# Patient Record
Sex: FEMALE | Race: WHITE | Employment: OTHER | URBAN - METROPOLITAN AREA
[De-identification: names, ages, dates, MRNs, and addresses within clinical notes are randomized per-mention and may not be internally consistent; named-entity substitution may affect disease eponyms.]

---

## 2020-02-04 PROCEDURE — 96365 THER/PROPH/DIAG IV INF INIT: CPT

## 2020-02-04 PROCEDURE — 94762 N-INVAS EAR/PLS OXIMTRY CONT: CPT

## 2020-02-04 PROCEDURE — 96375 TX/PRO/DX INJ NEW DRUG ADDON: CPT

## 2020-02-04 PROCEDURE — 99285 EMERGENCY DEPT VISIT HI MDM: CPT

## 2020-02-05 ENCOUNTER — HOSPITAL ENCOUNTER (EMERGENCY)
Age: 75
Discharge: ANOTHER HOSPITAL | End: 2020-02-05
Attending: EMERGENCY MEDICINE
Payer: MEDICARE

## 2020-02-05 ENCOUNTER — ANESTHESIA EVENT (OUTPATIENT)
Dept: CARDIOTHORACIC SURGERY | Age: 75
DRG: 220 | End: 2020-02-05
Payer: MEDICARE

## 2020-02-05 ENCOUNTER — ANESTHESIA (OUTPATIENT)
Dept: CARDIOTHORACIC SURGERY | Age: 75
DRG: 220 | End: 2020-02-05
Payer: MEDICARE

## 2020-02-05 ENCOUNTER — HOSPITAL ENCOUNTER (OUTPATIENT)
Dept: NON INVASIVE DIAGNOSTICS | Age: 75
Discharge: HOME | End: 2020-02-05
Attending: THORACIC SURGERY (CARDIOTHORACIC VASCULAR SURGERY)

## 2020-02-05 ENCOUNTER — APPOINTMENT (OUTPATIENT)
Dept: CT IMAGING | Age: 75
End: 2020-02-05
Attending: EMERGENCY MEDICINE
Payer: MEDICARE

## 2020-02-05 ENCOUNTER — HOSPITAL ENCOUNTER (INPATIENT)
Age: 75
LOS: 7 days | Discharge: HOME HEALTH | DRG: 220 | End: 2020-02-12
Attending: THORACIC SURGERY (CARDIOTHORACIC VASCULAR SURGERY) | Admitting: THORACIC SURGERY (CARDIOTHORACIC VASCULAR SURGERY)
Payer: MEDICARE

## 2020-02-05 ENCOUNTER — APPOINTMENT (OUTPATIENT)
Dept: GENERAL RADIOLOGY | Age: 75
DRG: 220 | End: 2020-02-05
Attending: PHYSICIAN ASSISTANT
Payer: MEDICARE

## 2020-02-05 VITALS
HEIGHT: 64 IN | OXYGEN SATURATION: 99 % | BODY MASS INDEX: 25.61 KG/M2 | SYSTOLIC BLOOD PRESSURE: 114 MMHG | RESPIRATION RATE: 21 BRPM | WEIGHT: 150 LBS | TEMPERATURE: 98.4 F | HEART RATE: 99 BPM | DIASTOLIC BLOOD PRESSURE: 56 MMHG

## 2020-02-05 DIAGNOSIS — R07.9 CHEST PAIN, UNSPECIFIED TYPE: ICD-10-CM

## 2020-02-05 DIAGNOSIS — I71.019 DISSECTION OF THORACIC AORTA: Primary | ICD-10-CM

## 2020-02-05 DIAGNOSIS — Z98.890 S/P AORTIC DISSECTION REPAIR: Primary | ICD-10-CM

## 2020-02-05 PROBLEM — D62 POSTOPERATIVE ANEMIA DUE TO ACUTE BLOOD LOSS: Status: ACTIVE | Noted: 2020-02-05

## 2020-02-05 PROBLEM — I71.00 AORTIC DISSECTION (HCC): Status: ACTIVE | Noted: 2020-02-05

## 2020-02-05 LAB
ABO + RH BLD: NORMAL
ADMINISTERED INITIALS, ADMINIT: NORMAL
ALBUMIN SERPL-MCNC: 3.3 G/DL (ref 3.5–5)
ALBUMIN SERPL-MCNC: 3.6 G/DL (ref 3.5–5)
ALBUMIN SERPL-MCNC: 3.7 G/DL (ref 3.5–5)
ALBUMIN/GLOB SERPL: 1.2 {RATIO} (ref 1.1–2.2)
ALBUMIN/GLOB SERPL: 1.4 {RATIO} (ref 1.1–2.2)
ALBUMIN/GLOB SERPL: 1.9 {RATIO} (ref 1.1–2.2)
ALP SERPL-CCNC: 37 U/L (ref 45–117)
ALP SERPL-CCNC: 40 U/L (ref 45–117)
ALP SERPL-CCNC: 59 U/L (ref 45–117)
ALT SERPL-CCNC: 34 U/L (ref 12–78)
ALT SERPL-CCNC: 34 U/L (ref 12–78)
ALT SERPL-CCNC: 37 U/L (ref 12–78)
ANION GAP SERPL CALC-SCNC: 5 MMOL/L (ref 5–15)
ANION GAP SERPL CALC-SCNC: 6 MMOL/L (ref 5–15)
ANION GAP SERPL CALC-SCNC: 7 MMOL/L (ref 5–15)
APPEARANCE UR: ABNORMAL
APTT PPP: 26.6 SEC (ref 22.1–32)
APTT PPP: 27.6 SEC (ref 22.1–32)
ARTERIAL PATENCY WRIST A: ABNORMAL
ARTERIAL PATENCY WRIST A: ABNORMAL
AST SERPL-CCNC: 24 U/L (ref 15–37)
AST SERPL-CCNC: 44 U/L (ref 15–37)
AST SERPL-CCNC: 46 U/L (ref 15–37)
ATRIAL RATE: 64 BPM
BACTERIA URNS QL MICRO: NEGATIVE /HPF
BASE DEFICIT BLD-SCNC: 2 MMOL/L
BASE EXCESS BLD CALC-SCNC: 1 MMOL/L
BASOPHILS # BLD: 0 K/UL (ref 0–0.1)
BASOPHILS # BLD: 0 K/UL (ref 0–0.1)
BASOPHILS NFR BLD: 0 % (ref 0–1)
BASOPHILS NFR BLD: 0 % (ref 0–1)
BDY SITE: ABNORMAL
BDY SITE: ABNORMAL
BILIRUB SERPL-MCNC: 0.4 MG/DL (ref 0.2–1)
BILIRUB SERPL-MCNC: 0.6 MG/DL (ref 0.2–1)
BILIRUB SERPL-MCNC: 0.9 MG/DL (ref 0.2–1)
BILIRUB UR QL: NEGATIVE
BLOOD GROUP ANTIBODIES SERPL: NORMAL
BUN SERPL-MCNC: 13 MG/DL (ref 6–20)
BUN SERPL-MCNC: 14 MG/DL (ref 6–20)
BUN SERPL-MCNC: 15 MG/DL (ref 6–20)
BUN/CREAT SERPL: 12 (ref 12–20)
BUN/CREAT SERPL: 15 (ref 12–20)
BUN/CREAT SERPL: 17 (ref 12–20)
CA-I BLD-SCNC: 1.18 MMOL/L (ref 1.12–1.32)
CA-I BLD-SCNC: 1.22 MMOL/L (ref 1.12–1.32)
CALCIUM SERPL-MCNC: 8.1 MG/DL (ref 8.5–10.1)
CALCIUM SERPL-MCNC: 8.5 MG/DL (ref 8.5–10.1)
CALCIUM SERPL-MCNC: 8.6 MG/DL (ref 8.5–10.1)
CALCULATED P AXIS, ECG09: 36 DEGREES
CALCULATED R AXIS, ECG10: 54 DEGREES
CALCULATED T AXIS, ECG11: 62 DEGREES
CHLORIDE SERPL-SCNC: 108 MMOL/L (ref 97–108)
CHLORIDE SERPL-SCNC: 113 MMOL/L (ref 97–108)
CHLORIDE SERPL-SCNC: 115 MMOL/L (ref 97–108)
CO2 SERPL-SCNC: 25 MMOL/L (ref 21–32)
CO2 SERPL-SCNC: 26 MMOL/L (ref 21–32)
CO2 SERPL-SCNC: 26 MMOL/L (ref 21–32)
COLOR UR: ABNORMAL
COMMENT, HOLDF: NORMAL
CREAT SERPL-MCNC: 0.88 MG/DL (ref 0.55–1.02)
CREAT SERPL-MCNC: 0.92 MG/DL (ref 0.55–1.02)
CREAT SERPL-MCNC: 1.08 MG/DL (ref 0.55–1.02)
D50 ADMINISTERED, D50ADM: 0 ML
D50 ADMINISTERED, D50ADM: NORMAL ML
D50 ORDER, D50ORD: 0 ML
D50 ORDER, D50ORD: NORMAL ML
DIAGNOSIS, 93000: NORMAL
DIFFERENTIAL METHOD BLD: ABNORMAL
DIFFERENTIAL METHOD BLD: NORMAL
EOSINOPHIL # BLD: 0 K/UL (ref 0–0.4)
EOSINOPHIL # BLD: 0.1 K/UL (ref 0–0.4)
EOSINOPHIL NFR BLD: 0 % (ref 0–7)
EOSINOPHIL NFR BLD: 1 % (ref 0–7)
EPITH CASTS URNS QL MICRO: ABNORMAL /LPF
ERYTHROCYTE [DISTWIDTH] IN BLOOD BY AUTOMATED COUNT: 13 % (ref 11.5–14.5)
ERYTHROCYTE [DISTWIDTH] IN BLOOD BY AUTOMATED COUNT: 13 % (ref 11.5–14.5)
GAS FLOW.O2 O2 DELIVERY SYS: ABNORMAL L/MIN
GAS FLOW.O2 O2 DELIVERY SYS: ABNORMAL L/MIN
GAS FLOW.O2 SETTING OXYMISER: 12 BPM
GLOBULIN SER CALC-MCNC: 1.9 G/DL (ref 2–4)
GLOBULIN SER CALC-MCNC: 2.3 G/DL (ref 2–4)
GLOBULIN SER CALC-MCNC: 3 G/DL (ref 2–4)
GLSCOM COMMENTS: NORMAL
GLUCOSE BLD STRIP.AUTO-MCNC: 102 MG/DL (ref 65–100)
GLUCOSE BLD STRIP.AUTO-MCNC: 106 MG/DL (ref 65–100)
GLUCOSE BLD STRIP.AUTO-MCNC: 107 MG/DL (ref 65–100)
GLUCOSE BLD STRIP.AUTO-MCNC: 108 MG/DL (ref 65–100)
GLUCOSE BLD STRIP.AUTO-MCNC: 109 MG/DL (ref 65–100)
GLUCOSE BLD STRIP.AUTO-MCNC: 112 MG/DL (ref 65–100)
GLUCOSE BLD STRIP.AUTO-MCNC: 116 MG/DL (ref 65–100)
GLUCOSE BLD STRIP.AUTO-MCNC: 138 MG/DL (ref 65–100)
GLUCOSE BLD STRIP.AUTO-MCNC: 84 MG/DL (ref 65–100)
GLUCOSE BLD STRIP.AUTO-MCNC: 85 MG/DL (ref 65–100)
GLUCOSE BLD STRIP.AUTO-MCNC: 95 MG/DL (ref 65–100)
GLUCOSE BLD STRIP.AUTO-MCNC: 96 MG/DL (ref 65–100)
GLUCOSE SERPL-MCNC: 116 MG/DL (ref 65–100)
GLUCOSE SERPL-MCNC: 138 MG/DL (ref 65–100)
GLUCOSE SERPL-MCNC: 98 MG/DL (ref 65–100)
GLUCOSE UR STRIP.AUTO-MCNC: NEGATIVE MG/DL
GLUCOSE, GLC: 102 MG/DL
GLUCOSE, GLC: 106 MG/DL
GLUCOSE, GLC: 107 MG/DL
GLUCOSE, GLC: 108 MG/DL
GLUCOSE, GLC: 109 MG/DL
GLUCOSE, GLC: 111 MG/DL
GLUCOSE, GLC: 112 MG/DL
GLUCOSE, GLC: 113 MG/DL
GLUCOSE, GLC: 116 MG/DL
GLUCOSE, GLC: 128 MG/DL
GLUCOSE, GLC: 138 MG/DL
GLUCOSE, GLC: 138 MG/DL
GLUCOSE, GLC: 143 MG/DL
GLUCOSE, GLC: 189 MG/DL
GLUCOSE, GLC: 206 MG/DL
GLUCOSE, GLC: 210 MG/DL
GLUCOSE, GLC: 84 MG/DL
GLUCOSE, GLC: 85 MG/DL
GLUCOSE, GLC: 95 MG/DL
GLUCOSE, GLC: 96 MG/DL
GLUCOSE, GLC: NORMAL MG/DL
HCO3 BLD-SCNC: 23.7 MMOL/L (ref 22–26)
HCO3 BLD-SCNC: 25.6 MMOL/L (ref 22–26)
HCT VFR BLD AUTO: 25.6 % (ref 35–47)
HCT VFR BLD AUTO: 29.3 % (ref 35–47)
HCT VFR BLD AUTO: 35.8 % (ref 35–47)
HGB BLD-MCNC: 11.8 G/DL (ref 11.5–16)
HGB BLD-MCNC: 8.2 G/DL (ref 11.5–16)
HGB BLD-MCNC: 9.6 G/DL (ref 11.5–16)
HGB UR QL STRIP: NEGATIVE
HIGH TARGET, HITG: 130 MG/DL
HIGH TARGET, HITG: 140 MG/DL
HIGH TARGET, HITG: NORMAL MG/DL
IMM GRANULOCYTES # BLD AUTO: 0 K/UL (ref 0–0.04)
IMM GRANULOCYTES # BLD AUTO: 0.1 K/UL (ref 0–0.04)
IMM GRANULOCYTES NFR BLD AUTO: 0 % (ref 0–0.5)
IMM GRANULOCYTES NFR BLD AUTO: 1 % (ref 0–0.5)
INR PPP: 1.1 (ref 0.9–1.1)
INR PPP: 1.2 (ref 0.9–1.1)
INSULIN ADMINSTERED, INSADM: 1.1 UNITS/HOUR
INSULIN ADMINSTERED, INSADM: 1.3 UNITS/HOUR
INSULIN ADMINSTERED, INSADM: 1.6 UNITS/HOUR
INSULIN ADMINSTERED, INSADM: 1.9 UNITS/HOUR
INSULIN ADMINSTERED, INSADM: 2 UNITS/HOUR
INSULIN ADMINSTERED, INSADM: 2 UNITS/HOUR
INSULIN ADMINSTERED, INSADM: 2.3 UNITS/HOUR
INSULIN ADMINSTERED, INSADM: 2.5 UNITS/HOUR
INSULIN ADMINSTERED, INSADM: 2.6 UNITS/HOUR
INSULIN ADMINSTERED, INSADM: 2.6 UNITS/HOUR
INSULIN ADMINSTERED, INSADM: 3.1 UNITS/HOUR
INSULIN ADMINSTERED, INSADM: 3.4 UNITS/HOUR
INSULIN ADMINSTERED, INSADM: 3.6 UNITS/HOUR
INSULIN ADMINSTERED, INSADM: 4.3 UNITS/HOUR
INSULIN ADMINSTERED, INSADM: 5.8 UNITS/HOUR
INSULIN ADMINSTERED, INSADM: 5.8 UNITS/HOUR
INSULIN ADMINSTERED, INSADM: 7.5 UNITS/HOUR
INSULIN ADMINSTERED, INSADM: 7.7 UNITS/HOUR
INSULIN ADMINSTERED, INSADM: NORMAL UNITS/HOUR
INSULIN ORDER, INSORD: 1.1 UNITS/HOUR
INSULIN ORDER, INSORD: 1.3 UNITS/HOUR
INSULIN ORDER, INSORD: 1.6 UNITS/HOUR
INSULIN ORDER, INSORD: 1.9 UNITS/HOUR
INSULIN ORDER, INSORD: 2 UNITS/HOUR
INSULIN ORDER, INSORD: 2 UNITS/HOUR
INSULIN ORDER, INSORD: 2.3 UNITS/HOUR
INSULIN ORDER, INSORD: 2.5 UNITS/HOUR
INSULIN ORDER, INSORD: 2.6 UNITS/HOUR
INSULIN ORDER, INSORD: 2.6 UNITS/HOUR
INSULIN ORDER, INSORD: 3.1 UNITS/HOUR
INSULIN ORDER, INSORD: 3.4 UNITS/HOUR
INSULIN ORDER, INSORD: 3.6 UNITS/HOUR
INSULIN ORDER, INSORD: 4.3 UNITS/HOUR
INSULIN ORDER, INSORD: 5.8 UNITS/HOUR
INSULIN ORDER, INSORD: 5.8 UNITS/HOUR
INSULIN ORDER, INSORD: 7.5 UNITS/HOUR
INSULIN ORDER, INSORD: 7.7 UNITS/HOUR
INSULIN ORDER, INSORD: NORMAL UNITS/HOUR
KETONES UR QL STRIP.AUTO: NEGATIVE MG/DL
LEUKOCYTE ESTERASE UR QL STRIP.AUTO: ABNORMAL
LIPASE SERPL-CCNC: 352 U/L (ref 73–393)
LOW TARGET, LOT: 100 MG/DL
LOW TARGET, LOT: 95 MG/DL
LOW TARGET, LOT: NORMAL MG/DL
LYMPHOCYTES # BLD: 0.8 K/UL (ref 0.8–3.5)
LYMPHOCYTES # BLD: 1.9 K/UL (ref 0.8–3.5)
LYMPHOCYTES NFR BLD: 22 % (ref 12–49)
LYMPHOCYTES NFR BLD: 9 % (ref 12–49)
MAGNESIUM SERPL-MCNC: 2.3 MG/DL (ref 1.6–2.4)
MAGNESIUM SERPL-MCNC: 2.6 MG/DL (ref 1.6–2.4)
MCH RBC QN AUTO: 30.6 PG (ref 26–34)
MCH RBC QN AUTO: 30.6 PG (ref 26–34)
MCHC RBC AUTO-ENTMCNC: 32.8 G/DL (ref 30–36.5)
MCHC RBC AUTO-ENTMCNC: 33 G/DL (ref 30–36.5)
MCV RBC AUTO: 93 FL (ref 80–99)
MCV RBC AUTO: 93.3 FL (ref 80–99)
MINUTES UNTIL NEXT BG, NBG: 120 MIN
MINUTES UNTIL NEXT BG, NBG: 120 MIN
MINUTES UNTIL NEXT BG, NBG: 60 MIN
MINUTES UNTIL NEXT BG, NBG: NORMAL MIN
MONOCYTES # BLD: 0.5 K/UL (ref 0–1)
MONOCYTES # BLD: 0.7 K/UL (ref 0–1)
MONOCYTES NFR BLD: 6 % (ref 5–13)
MONOCYTES NFR BLD: 8 % (ref 5–13)
MUCOUS THREADS URNS QL MICRO: ABNORMAL /LPF
MULTIPLIER, MUL: 0.03
MULTIPLIER, MUL: 0.04
MULTIPLIER, MUL: 0.05
MULTIPLIER, MUL: 0.06
MULTIPLIER, MUL: 0.07
MULTIPLIER, MUL: NORMAL
NEUTS SEG # BLD: 6 K/UL (ref 1.8–8)
NEUTS SEG # BLD: 7.2 K/UL (ref 1.8–8)
NEUTS SEG NFR BLD: 69 % (ref 32–75)
NEUTS SEG NFR BLD: 84 % (ref 32–75)
NITRITE UR QL STRIP.AUTO: NEGATIVE
NRBC # BLD: 0 K/UL (ref 0–0.01)
NRBC # BLD: 0 K/UL (ref 0–0.01)
NRBC BLD-RTO: 0 PER 100 WBC
NRBC BLD-RTO: 0 PER 100 WBC
O2/TOTAL GAS SETTING VFR VENT: 50 %
O2/TOTAL GAS SETTING VFR VENT: 80 %
ORDER INITIALS, ORDINIT: NORMAL
P-R INTERVAL, ECG05: 202 MS
PCO2 BLD: 41 MMHG (ref 35–45)
PCO2 BLD: 42.9 MMHG (ref 35–45)
PEEP RESPIRATORY: 5 CMH2O
PEEP RESPIRATORY: 5 CMH2O
PH BLD: 7.37 [PH] (ref 7.35–7.45)
PH BLD: 7.38 [PH] (ref 7.35–7.45)
PH UR STRIP: 6 [PH] (ref 5–8)
PLATELET # BLD AUTO: 100 K/UL (ref 150–400)
PLATELET # BLD AUTO: 184 K/UL (ref 150–400)
PMV BLD AUTO: 10.2 FL (ref 8.9–12.9)
PMV BLD AUTO: 9.9 FL (ref 8.9–12.9)
PO2 BLD: 100 MMHG (ref 80–100)
PO2 BLD: 162 MMHG (ref 80–100)
POTASSIUM SERPL-SCNC: 3.3 MMOL/L (ref 3.5–5.1)
POTASSIUM SERPL-SCNC: 3.4 MMOL/L (ref 3.5–5.1)
POTASSIUM SERPL-SCNC: 4 MMOL/L (ref 3.5–5.1)
PRESSURE SUPPORT SETTING VENT: 5 CMH2O
PROT SERPL-MCNC: 5.6 G/DL (ref 6.4–8.2)
PROT SERPL-MCNC: 5.6 G/DL (ref 6.4–8.2)
PROT SERPL-MCNC: 6.6 G/DL (ref 6.4–8.2)
PROT UR STRIP-MCNC: 30 MG/DL
PROTHROMBIN TIME: 11.5 SEC (ref 9–11.1)
PROTHROMBIN TIME: 11.9 SEC (ref 9–11.1)
Q-T INTERVAL, ECG07: 444 MS
QRS DURATION, ECG06: 94 MS
QTC CALCULATION (BEZET), ECG08: 458 MS
RBC # BLD AUTO: 3.14 M/UL (ref 3.8–5.2)
RBC # BLD AUTO: 3.85 M/UL (ref 3.8–5.2)
RBC #/AREA URNS HPF: ABNORMAL /HPF (ref 0–5)
SAMPLES BEING HELD,HOLD: NORMAL
SAO2 % BLD: 98 % (ref 92–97)
SAO2 % BLD: 99 % (ref 92–97)
SERVICE CMNT-IMP: ABNORMAL
SERVICE CMNT-IMP: NORMAL
SODIUM SERPL-SCNC: 141 MMOL/L (ref 136–145)
SODIUM SERPL-SCNC: 144 MMOL/L (ref 136–145)
SODIUM SERPL-SCNC: 146 MMOL/L (ref 136–145)
SP GR UR REFRACTOMETRY: 1.02 (ref 1–1.03)
SPECIMEN EXP DATE BLD: NORMAL
SPECIMEN TYPE: ABNORMAL
SPECIMEN TYPE: ABNORMAL
THERAPEUTIC RANGE,PTTT: NORMAL SECS (ref 58–77)
THERAPEUTIC RANGE,PTTT: NORMAL SECS (ref 58–77)
TOTAL RESP. RATE, ITRR: 12
TOTAL RESP. RATE, ITRR: 17
TROPONIN I SERPL-MCNC: <0.05 NG/ML
UR CULT HOLD, URHOLD: NORMAL
UROBILINOGEN UR QL STRIP.AUTO: 0.2 EU/DL (ref 0.2–1)
VENTILATION MODE VENT: ABNORMAL
VENTILATION MODE VENT: ABNORMAL
VENTRICULAR RATE, ECG03: 64 BPM
VOLUME CONTROL IVLC: YES
VT SETTING VENT: 380 ML
WBC # BLD AUTO: 8.6 K/UL (ref 3.6–11)
WBC # BLD AUTO: 8.7 K/UL (ref 3.6–11)
WBC URNS QL MICRO: >100 /HPF (ref 0–4)

## 2020-02-05 PROCEDURE — 77030002933 HC SUT MCRYL J&J -A: Performed by: THORACIC SURGERY (CARDIOTHORACIC VASCULAR SURGERY)

## 2020-02-05 PROCEDURE — 74011000250 HC RX REV CODE- 250: Performed by: THORACIC SURGERY (CARDIOTHORACIC VASCULAR SURGERY)

## 2020-02-05 PROCEDURE — 74011636637 HC RX REV CODE- 636/637: Performed by: NURSE ANESTHETIST, CERTIFIED REGISTERED

## 2020-02-05 PROCEDURE — 85018 HEMOGLOBIN: CPT

## 2020-02-05 PROCEDURE — 85730 THROMBOPLASTIN TIME PARTIAL: CPT

## 2020-02-05 PROCEDURE — P9059 PLASMA, FRZ BETWEEN 8-24HOUR: HCPCS

## 2020-02-05 PROCEDURE — P9035 PLATELET PHERES LEUKOREDUCED: HCPCS

## 2020-02-05 PROCEDURE — 77030018846 HC SOL IRR STRL H20 ICUM -A: Performed by: THORACIC SURGERY (CARDIOTHORACIC VASCULAR SURGERY)

## 2020-02-05 PROCEDURE — 74011250636 HC RX REV CODE- 250/636: Performed by: THORACIC SURGERY (CARDIOTHORACIC VASCULAR SURGERY)

## 2020-02-05 PROCEDURE — 77030041244 HC CBL PACE EXT TEMP REMG -B: Performed by: THORACIC SURGERY (CARDIOTHORACIC VASCULAR SURGERY)

## 2020-02-05 PROCEDURE — 87086 URINE CULTURE/COLONY COUNT: CPT

## 2020-02-05 PROCEDURE — 77030012390 HC DRN CHST BTL GTNG -B: Performed by: THORACIC SURGERY (CARDIOTHORACIC VASCULAR SURGERY)

## 2020-02-05 PROCEDURE — 84484 ASSAY OF TROPONIN QUANT: CPT

## 2020-02-05 PROCEDURE — 77030020089 HC KT PERC FEM ART MEDT -C: Performed by: THORACIC SURGERY (CARDIOTHORACIC VASCULAR SURGERY)

## 2020-02-05 PROCEDURE — 82962 GLUCOSE BLOOD TEST: CPT

## 2020-02-05 PROCEDURE — 77030018835 HC SOL IRR LR ICUM -A: Performed by: THORACIC SURGERY (CARDIOTHORACIC VASCULAR SURGERY)

## 2020-02-05 PROCEDURE — 76010000118 HC CV SURG 6 TO 6.5 HR: Performed by: THORACIC SURGERY (CARDIOTHORACIC VASCULAR SURGERY)

## 2020-02-05 PROCEDURE — 77030010507 HC ADH SKN DERMBND J&J -B: Performed by: THORACIC SURGERY (CARDIOTHORACIC VASCULAR SURGERY)

## 2020-02-05 PROCEDURE — 81001 URINALYSIS AUTO W/SCOPE: CPT

## 2020-02-05 PROCEDURE — 74011636320 HC RX REV CODE- 636/320: Performed by: RADIOLOGY

## 2020-02-05 PROCEDURE — 3E083GC INTRODUCTION OF OTHER THERAPEUTIC SUBSTANCE INTO HEART, PERCUTANEOUS APPROACH: ICD-10-PCS | Performed by: THORACIC SURGERY (CARDIOTHORACIC VASCULAR SURGERY)

## 2020-02-05 PROCEDURE — 74011000250 HC RX REV CODE- 250: Performed by: NURSE ANESTHETIST, CERTIFIED REGISTERED

## 2020-02-05 PROCEDURE — 86900 BLOOD TYPING SEROLOGIC ABO: CPT

## 2020-02-05 PROCEDURE — 85025 COMPLETE CBC W/AUTO DIFF WBC: CPT

## 2020-02-05 PROCEDURE — 74011250636 HC RX REV CODE- 250/636: Performed by: EMERGENCY MEDICINE

## 2020-02-05 PROCEDURE — 77030013798 HC KT TRNSDUC PRSSR EDWD -B: Performed by: THORACIC SURGERY (CARDIOTHORACIC VASCULAR SURGERY)

## 2020-02-05 PROCEDURE — 77030018836 HC SOL IRR NACL ICUM -A: Performed by: THORACIC SURGERY (CARDIOTHORACIC VASCULAR SURGERY)

## 2020-02-05 PROCEDURE — 74011000250 HC RX REV CODE- 250: Performed by: PHYSICIAN ASSISTANT

## 2020-02-05 PROCEDURE — 30283B1 TRANSFUSION OF NONAUTOLOGOUS 4-FACTOR PROTHROMBIN COMPLEX CONCENTRATE INTO VEIN, PERCUTANEOUS APPROACH: ICD-10-PCS | Performed by: THORACIC SURGERY (CARDIOTHORACIC VASCULAR SURGERY)

## 2020-02-05 PROCEDURE — 77030041076 HC DRSG AG OPTICELL MDII -A: Performed by: THORACIC SURGERY (CARDIOTHORACIC VASCULAR SURGERY)

## 2020-02-05 PROCEDURE — 71045 X-RAY EXAM CHEST 1 VIEW: CPT

## 2020-02-05 PROCEDURE — 74011250636 HC RX REV CODE- 250/636: Performed by: NURSE ANESTHETIST, CERTIFIED REGISTERED

## 2020-02-05 PROCEDURE — 77030019702 HC WRP THER MENM -C: Performed by: THORACIC SURGERY (CARDIOTHORACIC VASCULAR SURGERY)

## 2020-02-05 PROCEDURE — P9045 ALBUMIN (HUMAN), 5%, 250 ML: HCPCS | Performed by: PHYSICIAN ASSISTANT

## 2020-02-05 PROCEDURE — 77030040504 HC DRN WND MDII -B: Performed by: THORACIC SURGERY (CARDIOTHORACIC VASCULAR SURGERY)

## 2020-02-05 PROCEDURE — 76010000179 HC OR TIME 6 TO 6.5 HR INTENSV-TIER 1: Performed by: THORACIC SURGERY (CARDIOTHORACIC VASCULAR SURGERY)

## 2020-02-05 PROCEDURE — 86923 COMPATIBILITY TEST ELECTRIC: CPT

## 2020-02-05 PROCEDURE — 77030020263 HC SOL INJ SOD CL0.9% LFCR 1000ML: Performed by: THORACIC SURGERY (CARDIOTHORACIC VASCULAR SURGERY)

## 2020-02-05 PROCEDURE — 36415 COLL VENOUS BLD VENIPUNCTURE: CPT

## 2020-02-05 PROCEDURE — 76060000043 HC ANESTHESIA 6 TO 6.5 HR: Performed by: THORACIC SURGERY (CARDIOTHORACIC VASCULAR SURGERY)

## 2020-02-05 PROCEDURE — 77030020851 HC CAUT BTRY HI AARM -A: Performed by: THORACIC SURGERY (CARDIOTHORACIC VASCULAR SURGERY)

## 2020-02-05 PROCEDURE — C1768 GRAFT, VASCULAR: HCPCS | Performed by: THORACIC SURGERY (CARDIOTHORACIC VASCULAR SURGERY)

## 2020-02-05 PROCEDURE — 80053 COMPREHEN METABOLIC PANEL: CPT

## 2020-02-05 PROCEDURE — 94002 VENT MGMT INPAT INIT DAY: CPT

## 2020-02-05 PROCEDURE — 85610 PROTHROMBIN TIME: CPT

## 2020-02-05 PROCEDURE — 74011636637 HC RX REV CODE- 636/637: Performed by: PHYSICIAN ASSISTANT

## 2020-02-05 PROCEDURE — 4A133B3 MONITORING OF ARTERIAL PRESSURE, PULMONARY, PERCUTANEOUS APPROACH: ICD-10-PCS | Performed by: ANESTHESIOLOGY

## 2020-02-05 PROCEDURE — 82803 BLOOD GASES ANY COMBINATION: CPT

## 2020-02-05 PROCEDURE — 77030025869: Performed by: THORACIC SURGERY (CARDIOTHORACIC VASCULAR SURGERY)

## 2020-02-05 PROCEDURE — 77030006994: Performed by: THORACIC SURGERY (CARDIOTHORACIC VASCULAR SURGERY)

## 2020-02-05 PROCEDURE — 83690 ASSAY OF LIPASE: CPT

## 2020-02-05 PROCEDURE — 02HQ32Z INSERTION OF MONITORING DEVICE INTO RIGHT PULMONARY ARTERY, PERCUTANEOUS APPROACH: ICD-10-PCS | Performed by: ANESTHESIOLOGY

## 2020-02-05 PROCEDURE — P9016 RBC LEUKOCYTES REDUCED: HCPCS

## 2020-02-05 PROCEDURE — 77030005513 HC CATH URETH FOL11 MDII -B: Performed by: THORACIC SURGERY (CARDIOTHORACIC VASCULAR SURGERY)

## 2020-02-05 PROCEDURE — 77030002986 HC SUT PROL J&J -A: Performed by: THORACIC SURGERY (CARDIOTHORACIC VASCULAR SURGERY)

## 2020-02-05 PROCEDURE — 77030014491 HC PLEDG PTFE BARD -A: Performed by: THORACIC SURGERY (CARDIOTHORACIC VASCULAR SURGERY)

## 2020-02-05 PROCEDURE — 77030010506 HC ADH BIOGLU CRYO -G: Performed by: THORACIC SURGERY (CARDIOTHORACIC VASCULAR SURGERY)

## 2020-02-05 PROCEDURE — 77030019579 HC CBL PACE DISP REMG -B: Performed by: THORACIC SURGERY (CARDIOTHORACIC VASCULAR SURGERY)

## 2020-02-05 PROCEDURE — 71275 CT ANGIOGRAPHY CHEST: CPT

## 2020-02-05 PROCEDURE — C9132 KCENTRA, PER I.U.: HCPCS | Performed by: THORACIC SURGERY (CARDIOTHORACIC VASCULAR SURGERY)

## 2020-02-05 PROCEDURE — 02VX0DZ RESTRICTION OF THORACIC AORTA, ASCENDING/ARCH WITH INTRALUMINAL DEVICE, OPEN APPROACH: ICD-10-PCS | Performed by: THORACIC SURGERY (CARDIOTHORACIC VASCULAR SURGERY)

## 2020-02-05 PROCEDURE — 77030011235 HC DRN PERCRD SUMP MEDT -B: Performed by: THORACIC SURGERY (CARDIOTHORACIC VASCULAR SURGERY)

## 2020-02-05 PROCEDURE — C1751 CATH, INF, PER/CENT/MIDLINE: HCPCS | Performed by: ANESTHESIOLOGY

## 2020-02-05 PROCEDURE — 77030020256 HC SOL INJ NACL 0.9%  500ML: Performed by: THORACIC SURGERY (CARDIOTHORACIC VASCULAR SURGERY)

## 2020-02-05 PROCEDURE — 65610000003 HC RM ICU SURGICAL

## 2020-02-05 PROCEDURE — 74011250636 HC RX REV CODE- 250/636: Performed by: PHYSICIAN ASSISTANT

## 2020-02-05 PROCEDURE — C1729 CATH, DRAINAGE: HCPCS | Performed by: THORACIC SURGERY (CARDIOTHORACIC VASCULAR SURGERY)

## 2020-02-05 PROCEDURE — C9113 INJ PANTOPRAZOLE SODIUM, VIA: HCPCS | Performed by: PHYSICIAN ASSISTANT

## 2020-02-05 PROCEDURE — 77030010818: Performed by: THORACIC SURGERY (CARDIOTHORACIC VASCULAR SURGERY)

## 2020-02-05 PROCEDURE — 74011000258 HC RX REV CODE- 258: Performed by: PHYSICIAN ASSISTANT

## 2020-02-05 PROCEDURE — 77030014008 HC SPNG HEMSTAT J&J -C: Performed by: THORACIC SURGERY (CARDIOTHORACIC VASCULAR SURGERY)

## 2020-02-05 PROCEDURE — 77030026438 HC STYL ET INTUB CARD -A: Performed by: ANESTHESIOLOGY

## 2020-02-05 PROCEDURE — 02HV33Z INSERTION OF INFUSION DEVICE INTO SUPERIOR VENA CAVA, PERCUTANEOUS APPROACH: ICD-10-PCS | Performed by: ANESTHESIOLOGY

## 2020-02-05 PROCEDURE — 77030008684 HC TU ET CUF COVD -B: Performed by: ANESTHESIOLOGY

## 2020-02-05 PROCEDURE — B246ZZ4 ULTRASONOGRAPHY OF RIGHT AND LEFT HEART, TRANSESOPHAGEAL: ICD-10-PCS | Performed by: ANESTHESIOLOGY

## 2020-02-05 PROCEDURE — 77030005401 HC CATH RAD ARRO -A: Performed by: ANESTHESIOLOGY

## 2020-02-05 PROCEDURE — 88304 TISSUE EXAM BY PATHOLOGIST: CPT

## 2020-02-05 PROCEDURE — 74011000258 HC RX REV CODE- 258: Performed by: EMERGENCY MEDICINE

## 2020-02-05 PROCEDURE — 74011000258 HC RX REV CODE- 258: Performed by: NURSE ANESTHETIST, CERTIFIED REGISTERED

## 2020-02-05 PROCEDURE — 74011250637 HC RX REV CODE- 250/637: Performed by: PHYSICIAN ASSISTANT

## 2020-02-05 PROCEDURE — 77030020167 HC PERF SET MULT MEDT -B: Performed by: THORACIC SURGERY (CARDIOTHORACIC VASCULAR SURGERY)

## 2020-02-05 PROCEDURE — 74011000250 HC RX REV CODE- 250: Performed by: EMERGENCY MEDICINE

## 2020-02-05 PROCEDURE — 93005 ELECTROCARDIOGRAM TRACING: CPT

## 2020-02-05 PROCEDURE — 77030010797: Performed by: THORACIC SURGERY (CARDIOTHORACIC VASCULAR SURGERY)

## 2020-02-05 PROCEDURE — 83735 ASSAY OF MAGNESIUM: CPT

## 2020-02-05 PROCEDURE — 77030011640 HC PAD GRND REM COVD -A: Performed by: THORACIC SURGERY (CARDIOTHORACIC VASCULAR SURGERY)

## 2020-02-05 PROCEDURE — 5A1221Z PERFORMANCE OF CARDIAC OUTPUT, CONTINUOUS: ICD-10-PCS | Performed by: THORACIC SURGERY (CARDIOTHORACIC VASCULAR SURGERY)

## 2020-02-05 PROCEDURE — 77030002973 HC SUT PLEDG CV SFT OVL TELE -B: Performed by: THORACIC SURGERY (CARDIOTHORACIC VASCULAR SURGERY)

## 2020-02-05 PROCEDURE — 93355 ECHO TRANSESOPHAGEAL (TEE): CPT | Performed by: THORACIC SURGERY (CARDIOTHORACIC VASCULAR SURGERY)

## 2020-02-05 PROCEDURE — 74011000636 HC RX REV CODE- 636: Performed by: THORACIC SURGERY (CARDIOTHORACIC VASCULAR SURGERY)

## 2020-02-05 DEVICE — BARD® PTFE FELT, 15.2 CM X 15.2 CM
Type: IMPLANTABLE DEVICE | Site: AORTA | Status: FUNCTIONAL
Brand: BARD® PTFE FELT

## 2020-02-05 DEVICE — HEMASHIELD PLATINUM WOVEN STRAIGHT DOUBLE VELOUR VASCULAR GRAFT WITH GRAFT SIZER ACCESSORY
Type: IMPLANTABLE DEVICE | Site: AORTA | Status: FUNCTIONAL
Brand: HEMASHIELD

## 2020-02-05 RX ORDER — TRIAZOLAM 0.12 MG/1
0.12 TABLET ORAL DAILY
COMMUNITY
Start: 2020-01-02 | End: 2020-02-17

## 2020-02-05 RX ORDER — SODIUM CHLORIDE 9 MG/ML
INJECTION, SOLUTION INTRAVENOUS
Status: DISCONTINUED | OUTPATIENT
Start: 2020-02-05 | End: 2020-02-05 | Stop reason: HOSPADM

## 2020-02-05 RX ORDER — FENTANYL CITRATE 50 UG/ML
INJECTION, SOLUTION INTRAMUSCULAR; INTRAVENOUS
Status: DISCONTINUED
Start: 2020-02-05 | End: 2020-02-05 | Stop reason: HOSPADM

## 2020-02-05 RX ORDER — SODIUM CHLORIDE 9 MG/ML
9 INJECTION, SOLUTION INTRAVENOUS CONTINUOUS
Status: DISCONTINUED | OUTPATIENT
Start: 2020-02-05 | End: 2020-02-07

## 2020-02-05 RX ORDER — CEFAZOLIN SODIUM 1 G/3ML
INJECTION, POWDER, FOR SOLUTION INTRAMUSCULAR; INTRAVENOUS AS NEEDED
Status: DISCONTINUED | OUTPATIENT
Start: 2020-02-05 | End: 2020-02-05 | Stop reason: HOSPADM

## 2020-02-05 RX ORDER — DEXTROSE MONOHYDRATE 100 MG/ML
0-250 INJECTION, SOLUTION INTRAVENOUS AS NEEDED
Status: DISCONTINUED | OUTPATIENT
Start: 2020-02-05 | End: 2020-02-12 | Stop reason: HOSPADM

## 2020-02-05 RX ORDER — POLYETHYLENE GLYCOL 3350 17 G/17G
17 POWDER, FOR SOLUTION ORAL DAILY
Status: DISCONTINUED | OUTPATIENT
Start: 2020-02-06 | End: 2020-02-12 | Stop reason: HOSPADM

## 2020-02-05 RX ORDER — PHENYLEPHRINE 10 MG/250 ML(40 MCG/ML)IN 0.9 % SOD.CHLORIDE INTRAVENOUS
10-100
Status: DISCONTINUED | OUTPATIENT
Start: 2020-02-05 | End: 2020-02-05

## 2020-02-05 RX ORDER — SODIUM CHLORIDE 9 MG/ML
250 INJECTION, SOLUTION INTRAVENOUS AS NEEDED
Status: DISCONTINUED | OUTPATIENT
Start: 2020-02-05 | End: 2020-02-07

## 2020-02-05 RX ORDER — HEPARIN SOD,PORCINE/0.9 % NACL 30K/1000ML
50-1000 INTRAVENOUS SOLUTION INTRAVENOUS AS NEEDED
Status: DISCONTINUED | OUTPATIENT
Start: 2020-02-05 | End: 2020-02-05

## 2020-02-05 RX ORDER — ONDANSETRON 2 MG/ML
4 INJECTION INTRAMUSCULAR; INTRAVENOUS
Status: DISCONTINUED | OUTPATIENT
Start: 2020-02-05 | End: 2020-02-12 | Stop reason: HOSPADM

## 2020-02-05 RX ORDER — SODIUM CHLORIDE 0.9 % (FLUSH) 0.9 %
5-40 SYRINGE (ML) INJECTION AS NEEDED
Status: DISCONTINUED | OUTPATIENT
Start: 2020-02-05 | End: 2020-02-07

## 2020-02-05 RX ORDER — MUPIROCIN 20 MG/G
OINTMENT TOPICAL 2 TIMES DAILY
Status: COMPLETED | OUTPATIENT
Start: 2020-02-05 | End: 2020-02-09

## 2020-02-05 RX ORDER — MAGNESIUM SULFATE HEPTAHYDRATE 40 MG/ML
2 INJECTION, SOLUTION INTRAVENOUS ONCE
Status: DISCONTINUED | OUTPATIENT
Start: 2020-02-05 | End: 2020-02-05

## 2020-02-05 RX ORDER — HEPARIN SODIUM 1000 [USP'U]/ML
INJECTION, SOLUTION INTRAVENOUS; SUBCUTANEOUS AS NEEDED
Status: DISCONTINUED | OUTPATIENT
Start: 2020-02-05 | End: 2020-02-05 | Stop reason: HOSPADM

## 2020-02-05 RX ORDER — SODIUM CHLORIDE 450 MG/100ML
10 INJECTION, SOLUTION INTRAVENOUS CONTINUOUS
Status: DISCONTINUED | OUTPATIENT
Start: 2020-02-05 | End: 2020-02-07

## 2020-02-05 RX ORDER — BACITRACIN 500 UNIT/G
1 PACKET (EA) TOPICAL AS NEEDED
Status: DISCONTINUED | OUTPATIENT
Start: 2020-02-05 | End: 2020-02-07

## 2020-02-05 RX ORDER — SUFENTANIL CITRATE 50 UG/ML
INJECTION EPIDURAL; INTRAVENOUS AS NEEDED
Status: DISCONTINUED | OUTPATIENT
Start: 2020-02-05 | End: 2020-02-05 | Stop reason: HOSPADM

## 2020-02-05 RX ORDER — VECURONIUM BROMIDE FOR INJECTION 1 MG/ML
INJECTION, POWDER, LYOPHILIZED, FOR SOLUTION INTRAVENOUS AS NEEDED
Status: DISCONTINUED | OUTPATIENT
Start: 2020-02-05 | End: 2020-02-05 | Stop reason: HOSPADM

## 2020-02-05 RX ORDER — PANTOPRAZOLE SODIUM 40 MG/1
40 TABLET, DELAYED RELEASE ORAL
Status: DISCONTINUED | OUTPATIENT
Start: 2020-02-06 | End: 2020-02-12 | Stop reason: HOSPADM

## 2020-02-05 RX ORDER — OXYCODONE HYDROCHLORIDE 5 MG/1
5 TABLET ORAL
Status: DISCONTINUED | OUTPATIENT
Start: 2020-02-05 | End: 2020-02-12 | Stop reason: HOSPADM

## 2020-02-05 RX ORDER — LANOLIN ALCOHOL/MO/W.PET/CERES
3 CREAM (GRAM) TOPICAL
Status: DISCONTINUED | OUTPATIENT
Start: 2020-02-05 | End: 2020-02-12 | Stop reason: HOSPADM

## 2020-02-05 RX ORDER — INSULIN LISPRO 100 [IU]/ML
INJECTION, SOLUTION INTRAVENOUS; SUBCUTANEOUS
Status: DISCONTINUED | OUTPATIENT
Start: 2020-02-05 | End: 2020-02-07

## 2020-02-05 RX ORDER — DIPHENHYDRAMINE HCL 25 MG
25 CAPSULE ORAL
Status: DISCONTINUED | OUTPATIENT
Start: 2020-02-05 | End: 2020-02-12 | Stop reason: HOSPADM

## 2020-02-05 RX ORDER — AMIODARONE HYDROCHLORIDE 200 MG/1
400 TABLET ORAL EVERY 12 HOURS
Status: DISCONTINUED | OUTPATIENT
Start: 2020-02-06 | End: 2020-02-12 | Stop reason: HOSPADM

## 2020-02-05 RX ORDER — PRAVASTATIN SODIUM 20 MG/1
20 TABLET ORAL DAILY
COMMUNITY
Start: 2019-12-04

## 2020-02-05 RX ORDER — HYDROMORPHONE HYDROCHLORIDE 1 MG/ML
0.5 INJECTION, SOLUTION INTRAMUSCULAR; INTRAVENOUS; SUBCUTANEOUS
Status: DISCONTINUED | OUTPATIENT
Start: 2020-02-05 | End: 2020-02-07

## 2020-02-05 RX ORDER — MIDAZOLAM HYDROCHLORIDE 1 MG/ML
INJECTION, SOLUTION INTRAMUSCULAR; INTRAVENOUS AS NEEDED
Status: DISCONTINUED | OUTPATIENT
Start: 2020-02-05 | End: 2020-02-05 | Stop reason: HOSPADM

## 2020-02-05 RX ORDER — INSULIN GLARGINE 100 [IU]/ML
1-50 INJECTION, SOLUTION SUBCUTANEOUS
Status: ACTIVE | OUTPATIENT
Start: 2020-02-05 | End: 2020-02-06

## 2020-02-05 RX ORDER — POTASSIUM CHLORIDE 29.8 MG/ML
20 INJECTION INTRAVENOUS
Status: DISPENSED | OUTPATIENT
Start: 2020-02-05 | End: 2020-02-06

## 2020-02-05 RX ORDER — GUAIFENESIN 100 MG/5ML
81 LIQUID (ML) ORAL DAILY
Status: DISCONTINUED | OUTPATIENT
Start: 2020-02-06 | End: 2020-02-12 | Stop reason: HOSPADM

## 2020-02-05 RX ORDER — MAGNESIUM SULFATE 100 %
4 CRYSTALS MISCELLANEOUS AS NEEDED
Status: DISCONTINUED | OUTPATIENT
Start: 2020-02-05 | End: 2020-02-12 | Stop reason: HOSPADM

## 2020-02-05 RX ORDER — SUCCINYLCHOLINE CHLORIDE 20 MG/ML
INJECTION INTRAMUSCULAR; INTRAVENOUS AS NEEDED
Status: DISCONTINUED | OUTPATIENT
Start: 2020-02-05 | End: 2020-02-05 | Stop reason: HOSPADM

## 2020-02-05 RX ORDER — POTASSIUM CHLORIDE 29.8 MG/ML
20 INJECTION INTRAVENOUS ONCE
Status: DISCONTINUED | OUTPATIENT
Start: 2020-02-05 | End: 2020-02-05

## 2020-02-05 RX ORDER — PROTAMINE SULFATE 10 MG/ML
500 INJECTION, SOLUTION INTRAVENOUS ONCE
Status: DISCONTINUED | OUTPATIENT
Start: 2020-02-05 | End: 2020-02-05

## 2020-02-05 RX ORDER — METOPROLOL SUCCINATE 25 MG/1
25 TABLET, EXTENDED RELEASE ORAL DAILY
COMMUNITY
Start: 2019-11-30 | End: 2020-02-12

## 2020-02-05 RX ORDER — ALBUMIN HUMAN 50 G/1000ML
12.5 SOLUTION INTRAVENOUS
Status: COMPLETED | OUTPATIENT
Start: 2020-02-05 | End: 2020-02-05

## 2020-02-05 RX ORDER — ACETAMINOPHEN 10 MG/ML
1000 INJECTION, SOLUTION INTRAVENOUS EVERY 6 HOURS
Status: COMPLETED | OUTPATIENT
Start: 2020-02-05 | End: 2020-02-07

## 2020-02-05 RX ORDER — SUFENTANIL CITRATE 50 UG/ML
INJECTION EPIDURAL; INTRAVENOUS
Status: DISCONTINUED | OUTPATIENT
Start: 2020-02-05 | End: 2020-02-05 | Stop reason: HOSPADM

## 2020-02-05 RX ORDER — NALOXONE HYDROCHLORIDE 0.4 MG/ML
0.4 INJECTION, SOLUTION INTRAMUSCULAR; INTRAVENOUS; SUBCUTANEOUS AS NEEDED
Status: DISCONTINUED | OUTPATIENT
Start: 2020-02-05 | End: 2020-02-12 | Stop reason: HOSPADM

## 2020-02-05 RX ORDER — LABETALOL HCL 20 MG/4 ML
10 SYRINGE (ML) INTRAVENOUS
Status: COMPLETED | OUTPATIENT
Start: 2020-02-05 | End: 2020-02-05

## 2020-02-05 RX ORDER — FACIAL-BODY WIPES
10 EACH TOPICAL DAILY PRN
Status: DISCONTINUED | OUTPATIENT
Start: 2020-02-05 | End: 2020-02-12 | Stop reason: HOSPADM

## 2020-02-05 RX ORDER — NITROGLYCERIN 20 MG/100ML
16.5 INJECTION INTRAVENOUS CONTINUOUS
Status: DISCONTINUED | OUTPATIENT
Start: 2020-02-05 | End: 2020-02-05

## 2020-02-05 RX ORDER — LOSARTAN POTASSIUM 50 MG/1
50 TABLET ORAL DAILY
COMMUNITY
Start: 2019-11-30 | End: 2020-02-12

## 2020-02-05 RX ORDER — SODIUM CHLORIDE 0.9 % (FLUSH) 0.9 %
5-40 SYRINGE (ML) INJECTION AS NEEDED
Status: DISCONTINUED | OUTPATIENT
Start: 2020-02-05 | End: 2020-02-05 | Stop reason: HOSPADM

## 2020-02-05 RX ORDER — DIPHENHYDRAMINE HYDROCHLORIDE 50 MG/ML
25 INJECTION, SOLUTION INTRAMUSCULAR; INTRAVENOUS
Status: DISCONTINUED | OUTPATIENT
Start: 2020-02-05 | End: 2020-02-07

## 2020-02-05 RX ORDER — ALBUMIN HUMAN 50 G/1000ML
25 SOLUTION INTRAVENOUS ONCE
Status: DISCONTINUED | OUTPATIENT
Start: 2020-02-05 | End: 2020-02-05

## 2020-02-05 RX ORDER — SODIUM CHLORIDE 0.9 % (FLUSH) 0.9 %
5-40 SYRINGE (ML) INJECTION EVERY 8 HOURS
Status: DISCONTINUED | OUTPATIENT
Start: 2020-02-05 | End: 2020-02-05 | Stop reason: HOSPADM

## 2020-02-05 RX ORDER — HYDROMORPHONE HYDROCHLORIDE 1 MG/ML
1 INJECTION, SOLUTION INTRAMUSCULAR; INTRAVENOUS; SUBCUTANEOUS
Status: DISCONTINUED | OUTPATIENT
Start: 2020-02-05 | End: 2020-02-07

## 2020-02-05 RX ORDER — CHLORHEXIDINE GLUCONATE 1.2 MG/ML
10 RINSE ORAL EVERY 12 HOURS
Status: DISCONTINUED | OUTPATIENT
Start: 2020-02-05 | End: 2020-02-12 | Stop reason: HOSPADM

## 2020-02-05 RX ORDER — PROPOFOL 10 MG/ML
INJECTION, EMULSION INTRAVENOUS AS NEEDED
Status: DISCONTINUED | OUTPATIENT
Start: 2020-02-05 | End: 2020-02-05 | Stop reason: HOSPADM

## 2020-02-05 RX ORDER — SODIUM CHLORIDE 0.9 % (FLUSH) 0.9 %
5-40 SYRINGE (ML) INJECTION EVERY 8 HOURS
Status: DISCONTINUED | OUTPATIENT
Start: 2020-02-05 | End: 2020-02-07

## 2020-02-05 RX ORDER — PROTAMINE SULFATE 10 MG/ML
INJECTION, SOLUTION INTRAVENOUS AS NEEDED
Status: DISCONTINUED | OUTPATIENT
Start: 2020-02-05 | End: 2020-02-05 | Stop reason: HOSPADM

## 2020-02-05 RX ORDER — MAGNESIUM SULFATE 1 G/100ML
1 INJECTION INTRAVENOUS AS NEEDED
Status: DISCONTINUED | OUTPATIENT
Start: 2020-02-05 | End: 2020-02-07

## 2020-02-05 RX ORDER — AMOXICILLIN 250 MG
1 CAPSULE ORAL 2 TIMES DAILY
Status: DISCONTINUED | OUTPATIENT
Start: 2020-02-06 | End: 2020-02-12 | Stop reason: HOSPADM

## 2020-02-05 RX ORDER — DOBUTAMINE HYDROCHLORIDE 200 MG/100ML
0-10 INJECTION INTRAVENOUS
Status: DISCONTINUED | OUTPATIENT
Start: 2020-02-05 | End: 2020-02-07

## 2020-02-05 RX ORDER — CEFAZOLIN SODIUM/WATER 2 G/20 ML
2 SYRINGE (ML) INTRAVENOUS EVERY 6 HOURS
Status: COMPLETED | OUTPATIENT
Start: 2020-02-05 | End: 2020-02-06

## 2020-02-05 RX ORDER — LANOLIN ALCOHOL/MO/W.PET/CERES
400 CREAM (GRAM) TOPICAL 2 TIMES DAILY
Status: DISCONTINUED | OUTPATIENT
Start: 2020-02-06 | End: 2020-02-12 | Stop reason: HOSPADM

## 2020-02-05 RX ORDER — OXYCODONE HYDROCHLORIDE 5 MG/1
10 TABLET ORAL
Status: DISCONTINUED | OUTPATIENT
Start: 2020-02-05 | End: 2020-02-12 | Stop reason: HOSPADM

## 2020-02-05 RX ORDER — SODIUM CHLORIDE, SODIUM LACTATE, POTASSIUM CHLORIDE, CALCIUM CHLORIDE 600; 310; 30; 20 MG/100ML; MG/100ML; MG/100ML; MG/100ML
INJECTION, SOLUTION INTRAVENOUS
Status: DISCONTINUED | OUTPATIENT
Start: 2020-02-05 | End: 2020-02-05 | Stop reason: HOSPADM

## 2020-02-05 RX ORDER — LIDOCAINE HYDROCHLORIDE 20 MG/ML
INJECTION, SOLUTION EPIDURAL; INFILTRATION; INTRACAUDAL; PERINEURAL AS NEEDED
Status: DISCONTINUED | OUTPATIENT
Start: 2020-02-05 | End: 2020-02-05 | Stop reason: HOSPADM

## 2020-02-05 RX ORDER — FENTANYL CITRATE 50 UG/ML
50 INJECTION, SOLUTION INTRAMUSCULAR; INTRAVENOUS
Status: COMPLETED | OUTPATIENT
Start: 2020-02-05 | End: 2020-02-05

## 2020-02-05 RX ORDER — INSULIN LISPRO 100 [IU]/ML
INJECTION, SOLUTION INTRAVENOUS; SUBCUTANEOUS
Status: DISCONTINUED | OUTPATIENT
Start: 2020-02-05 | End: 2020-02-09

## 2020-02-05 RX ORDER — ALBUTEROL SULFATE 0.83 MG/ML
2.5 SOLUTION RESPIRATORY (INHALATION)
Status: DISCONTINUED | OUTPATIENT
Start: 2020-02-05 | End: 2020-02-12 | Stop reason: HOSPADM

## 2020-02-05 RX ORDER — MIDAZOLAM HYDROCHLORIDE 1 MG/ML
1 INJECTION, SOLUTION INTRAMUSCULAR; INTRAVENOUS
Status: DISCONTINUED | OUTPATIENT
Start: 2020-02-05 | End: 2020-02-07

## 2020-02-05 RX ADMIN — Medication 10 ML: at 11:39

## 2020-02-05 RX ADMIN — VECURONIUM BROMIDE 6 MG: 10 INJECTION, POWDER, LYOPHILIZED, FOR SOLUTION INTRAVENOUS at 04:55

## 2020-02-05 RX ADMIN — SODIUM CHLORIDE 2.3 UNITS/HR: 9 INJECTION, SOLUTION INTRAVENOUS at 04:17

## 2020-02-05 RX ADMIN — SODIUM CHLORIDE 10 G/HR: 900 INJECTION, SOLUTION INTRAVENOUS at 03:54

## 2020-02-05 RX ADMIN — SODIUM CHLORIDE 1 G: 900 INJECTION, SOLUTION INTRAVENOUS at 04:15

## 2020-02-05 RX ADMIN — LIDOCAINE HYDROCHLORIDE 100 MG: 20 INJECTION, SOLUTION EPIDURAL; INFILTRATION; INTRACAUDAL; PERINEURAL at 05:33

## 2020-02-05 RX ADMIN — FENTANYL CITRATE 50 MCG: 50 INJECTION, SOLUTION INTRAMUSCULAR; INTRAVENOUS at 01:46

## 2020-02-05 RX ADMIN — HYDROMORPHONE HYDROCHLORIDE 0.5 MG: 1 INJECTION, SOLUTION INTRAMUSCULAR; INTRAVENOUS; SUBCUTANEOUS at 21:19

## 2020-02-05 RX ADMIN — SODIUM CHLORIDE 10 ML/HR: 450 INJECTION, SOLUTION INTRAVENOUS at 10:34

## 2020-02-05 RX ADMIN — PROPOFOL 100 MG: 10 INJECTION, EMULSION INTRAVENOUS at 03:29

## 2020-02-05 RX ADMIN — SUFENTANIL CITRATE 0.3 MCG/KG/HR: 50 INJECTION EPIDURAL; INTRAVENOUS at 03:37

## 2020-02-05 RX ADMIN — Medication 10 ML: at 01:52

## 2020-02-05 RX ADMIN — SODIUM CHLORIDE: 900 INJECTION, SOLUTION INTRAVENOUS at 03:12

## 2020-02-05 RX ADMIN — LABETALOL 20 MG/4 ML (5 MG/ML) INTRAVENOUS SYRINGE 10 MG: at 01:55

## 2020-02-05 RX ADMIN — SODIUM CHLORIDE 5 MG/HR: 900 INJECTION, SOLUTION INTRAVENOUS at 06:22

## 2020-02-05 RX ADMIN — DEXMEDETOMIDINE HYDROCHLORIDE 0.6 MCG/KG/HR: 100 INJECTION, SOLUTION, CONCENTRATE INTRAVENOUS at 12:32

## 2020-02-05 RX ADMIN — MIDAZOLAM 1 MG: 1 INJECTION INTRAMUSCULAR; INTRAVENOUS at 03:23

## 2020-02-05 RX ADMIN — Medication 2 G: at 20:01

## 2020-02-05 RX ADMIN — Medication 10 ML: at 15:02

## 2020-02-05 RX ADMIN — SODIUM CHLORIDE 40 MG: 9 INJECTION INTRAMUSCULAR; INTRAVENOUS; SUBCUTANEOUS at 11:38

## 2020-02-05 RX ADMIN — CEFAZOLIN 2 G: 330 INJECTION, POWDER, FOR SOLUTION INTRAMUSCULAR; INTRAVENOUS at 03:56

## 2020-02-05 RX ADMIN — PHENYLEPHRINE HYDROCHLORIDE 20 MCG/MIN: 10 INJECTION INTRAVENOUS at 03:30

## 2020-02-05 RX ADMIN — CHLORHEXIDINE GLUCONATE 10 ML: 1.2 RINSE ORAL at 20:36

## 2020-02-05 RX ADMIN — POTASSIUM CHLORIDE 20 MEQ: 400 INJECTION, SOLUTION INTRAVENOUS at 11:37

## 2020-02-05 RX ADMIN — SUFENTANIL CITRATE 10 MCG: 50 INJECTION EPIDURAL; INTRAVENOUS at 06:23

## 2020-02-05 RX ADMIN — Medication 2 G: at 13:05

## 2020-02-05 RX ADMIN — SODIUM CHLORIDE 3.4 UNITS/HR: 9 INJECTION, SOLUTION INTRAVENOUS at 11:06

## 2020-02-05 RX ADMIN — WATER 1 G: 1 INJECTION INTRAMUSCULAR; INTRAVENOUS; SUBCUTANEOUS at 01:47

## 2020-02-05 RX ADMIN — ACETAMINOPHEN 1000 MG: 10 INJECTION, SOLUTION INTRAVENOUS at 10:46

## 2020-02-05 RX ADMIN — SODIUM CHLORIDE 5 MG/HR: 0.9 INJECTION, SOLUTION INTRAVENOUS at 01:52

## 2020-02-05 RX ADMIN — DEXMEDETOMIDINE HYDROCHLORIDE 0.3 MCG/KG/HR: 100 INJECTION, SOLUTION, CONCENTRATE INTRAVENOUS at 06:22

## 2020-02-05 RX ADMIN — HYDROMORPHONE HYDROCHLORIDE 0.5 MG: 1 INJECTION, SOLUTION INTRAMUSCULAR; INTRAVENOUS; SUBCUTANEOUS at 17:33

## 2020-02-05 RX ADMIN — SODIUM CHLORIDE, POTASSIUM CHLORIDE, SODIUM LACTATE AND CALCIUM CHLORIDE: 600; 310; 30; 20 INJECTION, SOLUTION INTRAVENOUS at 03:12

## 2020-02-05 RX ADMIN — ALBUMIN (HUMAN) 12.5 G: 12.5 INJECTION, SOLUTION INTRAVENOUS at 11:25

## 2020-02-05 RX ADMIN — MIDAZOLAM 1 MG: 1 INJECTION INTRAMUSCULAR; INTRAVENOUS at 03:14

## 2020-02-05 RX ADMIN — VECURONIUM BROMIDE 2 MG: 10 INJECTION, POWDER, LYOPHILIZED, FOR SOLUTION INTRAVENOUS at 03:29

## 2020-02-05 RX ADMIN — VECURONIUM BROMIDE 8 MG: 10 INJECTION, POWDER, LYOPHILIZED, FOR SOLUTION INTRAVENOUS at 03:36

## 2020-02-05 RX ADMIN — ACETAMINOPHEN 1000 MG: 10 INJECTION, SOLUTION INTRAVENOUS at 17:30

## 2020-02-05 RX ADMIN — CEFAZOLIN 2 G: 330 INJECTION, POWDER, FOR SOLUTION INTRAMUSCULAR; INTRAVENOUS at 06:53

## 2020-02-05 RX ADMIN — ACETAMINOPHEN 1000 MG: 10 INJECTION, SOLUTION INTRAVENOUS at 21:19

## 2020-02-05 RX ADMIN — MUPIROCIN: 20 OINTMENT TOPICAL at 11:10

## 2020-02-05 RX ADMIN — SODIUM CHLORIDE 2 MG/HR: 900 INJECTION, SOLUTION INTRAVENOUS at 10:59

## 2020-02-05 RX ADMIN — POTASSIUM CHLORIDE 20 MEQ: 400 INJECTION, SOLUTION INTRAVENOUS at 12:30

## 2020-02-05 RX ADMIN — MIDAZOLAM 1 MG: 1 INJECTION INTRAMUSCULAR; INTRAVENOUS at 03:17

## 2020-02-05 RX ADMIN — SUCCINYLCHOLINE CHLORIDE 120 MG: 20 INJECTION, SOLUTION INTRAMUSCULAR; INTRAVENOUS at 03:30

## 2020-02-05 RX ADMIN — ALBUMIN (HUMAN) 12.5 G: 12.5 INJECTION, SOLUTION INTRAVENOUS at 15:00

## 2020-02-05 RX ADMIN — MUPIROCIN: 20 OINTMENT TOPICAL at 19:18

## 2020-02-05 RX ADMIN — LIDOCAINE HYDROCHLORIDE 100 MG: 20 INJECTION, SOLUTION EPIDURAL; INFILTRATION; INTRACAUDAL; PERINEURAL at 03:29

## 2020-02-05 RX ADMIN — PROPOFOL 100 MG: 10 INJECTION, EMULSION INTRAVENOUS at 05:33

## 2020-02-05 RX ADMIN — IOPAMIDOL 100 ML: 755 INJECTION, SOLUTION INTRAVENOUS at 01:29

## 2020-02-05 RX ADMIN — SUFENTANIL CITRATE 20 MCG: 50 INJECTION EPIDURAL; INTRAVENOUS at 03:29

## 2020-02-05 RX ADMIN — HEPARIN SODIUM 25000 UNITS: 1000 INJECTION, SOLUTION INTRAVENOUS; SUBCUTANEOUS at 04:36

## 2020-02-05 RX ADMIN — PROTAMINE SULFATE 300 MG: 10 INJECTION, SOLUTION INTRAVENOUS at 07:10

## 2020-02-05 RX ADMIN — SODIUM CHLORIDE 9 ML/HR: 900 INJECTION, SOLUTION INTRAVENOUS at 19:56

## 2020-02-05 RX ADMIN — DESMOPRESSIN ACETATE 20 MCG: 4 SOLUTION INTRAVENOUS at 07:17

## 2020-02-05 RX ADMIN — PROTHROMBIN, COAGULATION FACTOR VII HUMAN, COAGULATION FACTOR IX HUMAN, COAGULATION FACTOR X HUMAN, PROTEIN C, PROTEIN S HUMAN, AND WATER 1144 INT'L UNITS: KIT at 09:04

## 2020-02-05 RX ADMIN — ALBUMIN (HUMAN) 12.5 G: 12.5 INJECTION, SOLUTION INTRAVENOUS at 10:46

## 2020-02-05 NOTE — ANESTHESIA PROCEDURE NOTES
Arterial Line Placement    Start time: 2/5/2020 3:20 AM  End time: 2/5/2020 3:23 AM  Performed by: Mukund Yeboah MD  Authorized by:  Mukund Yeboah MD     Pre-Procedure  Indications:  Arterial pressure monitoring  Preanesthetic Checklist: patient identified, risks and benefits discussed, anesthesia consent, site marked, patient being monitored, timeout performed and patient being monitored      Procedure:   Prep:  Chlorhexidine  Seldinger Technique?: Yes    Orientation:  Right  Location:  Radial artery  Catheter size:  20 G  Number of attempts:  1  Cont Cardiac Output Sensor: No      Assessment:   Post-procedure:  Line secured and sterile dressing applied  Patient Tolerance:  Patient tolerated the procedure well with no immediate complications

## 2020-02-05 NOTE — H&P
[facility]  History & Physical    Subjective: David Cline is a 69 y.o. female who was referred for cardiac surgery evaluation of Aortic Dissection by [address] [place] [address] ED. PMHx significant for HTN & mitral regurgitation of unknown significance. Patient presented to the ED with several hours of chest pain that began abruptly while at home. The pain was described as sharp/pressure, radiated into her back. CT scan of her chest, abdomen, and pelvis were done showing a type A aortic dissection from the aortic root to the iliac bifurcation. She was emergently transferred here for surgical repair. Past Medical History:   Diagnosis Date    Hypertension     Mitral valve problem     leaking valve     Past Surgical History:   Procedure Laterality Date    HX OTHER SURGICAL  02/05/2020    Type A Aortic Dissection Repair      Social History     Tobacco Use    Smoking status: Never Smoker    Smokeless tobacco: Never Used   Substance Use Topics    Alcohol use: Never     Frequency: Never      History reviewed. No pertinent family history. Prior to Admission medications    Not on File       Allergies   Allergen Reactions    Other Food Hives     Lobster      Tyler Anaphylaxis    Alcohol Hives    Honeydew Itching    Ruben Hives    Penicillins Hives and Rash     Review of Systems:   A complete review of systems was completed and found to be negative with the exception of what is noted in the HPI    Objective:     VS:   Visit Vitals  /50 (BP 1 Location: Left arm, BP Patient Position: At rest)   Pulse (!) 106   Resp 20   Ht 5' 4\" (1.626 m)   Wt 149 lb 14.6 oz (68 kg)   SpO2 94%   BMI 25.73 kg/m²     Physical Exam:    General appearance: alert, cooperative, no distress  Head: normocephalic, without obvious abnormality; atraumatic  Eyes: conjunctivae/corneas clear; EOM's intact. Nose: nares normal; no drainage.   Neck: no carotid bruit and no JVD  Lungs: clear to auscultation bilaterally  Heart: regular rate and rhythm; no murmur  Abdomen: soft, non-tender; bowel sounds normal  Extremities: moves all extremities; no weakness. Skin: Skin color normal; No varicose veins or edema. Neurologic: Grossly normal      Labs:   Recent Labs     02/05/20  0029   WBC 8.7   HGB 11.8   HCT 35.8         K 3.4*   BUN 15   CREA 0.88   *       Diagnostics:   CT C/A/P:   IMPRESSION:  1. Type A aortic dissection extending from the aortic root to the iliac  bifurcation.     2. Hypoenhancement in the upper right kidney may represent infarcts, scarring,  or infection. Assessment:     Principal Problem:    S/P aortic dissection repair (2/5/2020)      Overview: Type A Aortic Dissection Repair, 24mm Hemashield Graft      Resuspension of Aortic Valve      Right Femoral Cutdown for Peripheral Cannulation      DHCA, Antegrade Cerebral Perfusion      Plan:     1. Type A Aortic Dissection: The risk and benefit of surgery were reviewed with patient and family and all questions answered and the patient wishes to proceed. Risk include infection, bleeding, stroke, heart attack, irregular heart rhythm, kidney failure and death. Will proceed with emergent repair. 2. HTN: BP Control, SBP goal <120    3.  Mitral Regurgitation: will assess on intraop SLOAN      Signed By: GAIL Grande     February 5, 2020

## 2020-02-05 NOTE — OP NOTES
[facility]  OPERATIVE REPORT    Name:  Richelle Purdy  MR#:  [id number]  :  1945  ACCOUNT #:  [de-identified]  DATE OF SERVICE:  2020      PREOPERATIVE DIAGNOSIS:  Type A acute aortic dissection. POSTOPERATIVE DIAGNOSIS:  Type A acute aortic dissection. PROCEDURES PERFORMED:  1. Repair of type A aortic dissection with interposition of 24-mm Hemashield tube graft from the sinotubular junction to the origin of the great vessels. 2.  Right femoral artery exploration, cannulation of right femoral artery, and repair of right femoral artery. SURGEON:  Flora Solomon MD    ASSISTANT:  GAIL Donahue    ANESTHESIA:  General.    COMPLICATIONS:  None. SPECIMENS REMOVED:  Aortic dissection and ascending aorta. IMPLANTS:  A 24 Hemashield graft. ESTIMATED BLOOD LOSS:  900 mL. INDICATIONS:  This is a 46-year-old female who presented with acute onset of back and neck pain and on CT had a type A aortic dissection extending from the sinotubular junction to the external iliac arteries. These findings were confirmed. The patient was taken to the operating room for emergent repair. DESCRIPTION OF PROCEDURE:  The patient was taken to the operating room in transfer from [facility] and following induction of endotracheal anesthesia and access line placement by Anesthesia, transesophageal echocardiographic findings were reviewed and a surgical time-out completed. The patient underwent right groin exploration and isolation of the common femoral artery. A median sternotomy was then made. The pericardium incised and suspended. The patient fully heparinized. Using Seldinger technique, a 19-Frisian cannula was introduced into the common femoral artery for antegrade perfusion. Pursestring sutures placed in the right atrium for placement of a retrograde coronary sinus cannula and venous drainage. The patient was placed on bypass and cooled to 26 degrees.   The aorta cross-clamped. Cardioplegia infused and repeat dosages given intermittently. Following electromechanical arrest, the proximal portion of the aorta was excised. There was a dissection involving the aortic root but the valve suspension apparatus and commissures were intact. BioGlue was applied to interpose the media and adventitial layers and felt support, circumannular around the remnant of the sinotubular junction was secured. Next, a period of approximately 35 minutes of circulatory arrest ensued with antegrade cerebral perfusion by direct cannula infusion into the innominate and common carotid artery. The distal end of the dissection and the aortic arch were examined. No specific tear was seen. Again glue was applied to interpose the layers and interposing also a layer felt inside and out. A 24 tube graft was selected and anastomosed end-to-side proximally and distally. A vent was placed in the tube graft. A warm dose of cardioplegia administered and the aortic cross-clamp released. De-airing maneuvers were carried out under echocardiographic guidance. The patient regained normothermia and was weaned from bypass. Protamine was administered. The patient was decannulated. The common femoral artery was repaired with several interrupted Prolene sutures. The venous cannulas were withdrawn. Mediastinal drainage tubes placed. The patient had developed a coagulopathy which was treated with fresh frozen plasma and platelets. Following placement of mediastinal drains and epicardial pacemaking leads, the sternum was reapproximated with interrupted stainless steel wire and running Vicryl suture. The groin was also closed in layers. A period of observation ensued in the operating room. Chest tube drainage progressively declined, the patient's hemodynamics remained stable, and she was transported to the intensive care unit.     Physician assistance was needed during this case due to the complexity of the operation to assist in identifying anatomic structures and securing proper technique for a dissection repair.         MD AGUSTIN [id number]  D:  02/05/2020 9:54  T:  02/05/2020 12:24  JOB #:  [id number]

## 2020-02-05 NOTE — ANESTHESIA PROCEDURE NOTES
Central Line and Pulmonary Artery Catheter Placement    Start time: 2/5/2020 3:35 AM  End time: 2/5/2020 3:46 AM  Performed by: Meredith Lomas MD  Authorized by: Meredith Lomas MD     Indications: vascular access, central pressure monitoring and need for vasopressors  Preanesthetic Checklist: patient identified, risks and benefits discussed, anesthesia consent, site marked, patient being monitored and timeout performed      Pre-procedure: All elements of maximal sterile barrier technique followed?  Yes    2% Chlorhexidine for cutaneous antisepsis, Hand hygiene performed prior to catheter insertion and Ultrasound guidance    Sterile Ultrasound Technique followed?: Yes            Procedure:   Prep:  Chlorhexidine  Location: internal jugular  Orientation:  Right  Patient position:  Flat  Catheter type:  Double lumen  Catheter size:  9 Fr  Catheter length:  12 cm  Number of attempts:  1  Successful placement: Yes      Assessment:   Post-procedure:  Catheter secured and sterile dressing with CHG applied  Assessment:  Blood return through all ports  Insertion:  Uncomplicated  Patient tolerance:  Patient tolerated the procedure well with no immediate complications  9 Fr MAC and 8 Fr CCO PA Catheter

## 2020-02-05 NOTE — PERIOP NOTES
68 yo female received in transfer from [address] [place] [address] ED with onset of chest and back pain  CTA demonstrates acute type A aortic dissection  Emergency repair scheduled  Discussed with patient

## 2020-02-05 NOTE — BRIEF OP NOTE
BRIEF OP NOTE    Pre-Op Diagnosis: Type A Dissection    Post-Op Diagnosis: Type A Dissection      Procedure:   Type A Aortic Dissection Repair, 24mm Hemashield Graft  Right Femoral Cutdown for Peripheral Cannulation  DHCA, Antegrade Cerebral Perfusion  Application of Prevena Incisional VAC Lot # [id number]    Surgeon: Humberto Hollis MD    Assistant(s): GAIL Stack    Anesthesia: General     Infusions/Support: Precedex, Insulin, Sukh    Estimated Blood Loss: 900    Cell Saver: 700    Specimens:   ID Type Source Tests Collected by Time Destination   1 : PORTION OF AORTA Fresh Aorta  Yakelin Arreola MD 2/5/2020 6451 Pathology       Drains and pacing wires: 2 atrial wires, 1 bipolar ventricular wire, 2 kayce drains    Complications: none    Findings: Aortic Dissection    Implants:   Implant Name Type Inv.  Item Serial No.  Lot No. LRB No. Used Action   BIO GLUE   N/A  [id number] N/A 2 Implanted   PATCH VASC FLT 1.65MM [id number] --  - [id number]  PATCH VASC FLT 1.65MM [id number] --   BARD PERIPHERAL VASCULAR [id number] N/A 1 Implanted   GRAFT HEMSHLD [address] [id number] --  - [id number]  GRAFT HEMSHLD [address] [id number] --  [id number] GETINGE AB MAQUET CARDIOVASCLR [id number] N/A 1 Implanted

## 2020-02-05 NOTE — PERIOP NOTES
Patient arrived to the operating room via stretcher. All wheels locked and patient transferred to the OR table with assistance of four staff members. Velasquez Cely MTRE warming machine on bed. Temperature set at 37 C and adjusted as needed. Safety strap across upper body until time of positioning and after drapes removed prior to transfer. Invasive line placement by Anesthesia. After the induction of anesthesia and intubation 16fr temperature owens catheter was inserted with no difficulties. Urine output and temperature monitored during case by all providers. Patient positioned with assistance of all providers. Head resting in proper alignment on gel doughnut. Gel Shoulder roll placed. Arms tucked as sides. Each arm was wrapped in one step arm protectors. All pressure points/IV lines/stopcocks padded with foam. Thumbs in proper alignment and ulnar padding in place. Picket fence foot positioner used during prep.

## 2020-02-05 NOTE — PERIOP NOTES
Patient arrived via EMS. Patient verified name and Date of birth consistent with available documentation and arm band. Patient verified understanding of Emergent Surgical procedure. Patient stated she had some questions. Surgical PA made ware and was gentry to address questions. Patient stated her  Mateusz Guerra was in waiting room and provided cell phone # [phone]. Will update  with progress of Surgery.

## 2020-02-05 NOTE — ED TRIAGE NOTES
Patient reported low back pain. However a couple of hours ago her upper back started to hurt and becoming uncomfortable. Denies fall or trauma. Pain started on its own.  Patient ambulatory and no acute distress noted

## 2020-02-05 NOTE — PROGRESS NOTES
[facility] ICU Progress Note    Admit Date: 2020  POD:  Day of Surgery    Procedure:  Procedure(s):  REPAIR TYPE A  AORTIC DISSECTION/ RIGHT FEMORAL CUTDOWN. SLOAN BY DR. Mayo Serrato. Subjective:   Pt just admitted to ICU from OR, emergent dissection repair. Remains on amio, insulin and precedex gtt      Objective:   Vitals:  Blood pressure 106/51, pulse 80, temperature 97.2 °F (36.2 °C), resp. rate 12, height 5' 4\" (1.626 m), weight 149 lb 14.6 oz (68 kg), SpO2 99 %. Temp (24hrs), Av.1 °F (36.2 °C), Min:96.2 °F (35.7 °C), Max:98.4 °F (36.9 °C)    Hemodynamics:   CO: CO (l/min): 3.2 l/min   CI: CI (l/min/m2): 1.8 l/min/m2   CVP: CVP (mmHg): 12 mmHg (20 1200)   SVR:     PAP Systolic: PAP Systolic: 31 (25 7719)   PAP Diastolic: PAP Diastolic: 19 (87/79/36 0061)   PVR:     SV02: SVO2 (%): 72 % (20 1037)   SCV02:      EKG/Rhythm:  Paced at 80 bpm     Extubation Date / Time: n/a    CT Output: n/a      Ventilator:       Oxygen Therapy:  Oxygen Therapy  O2 Sat (%): 99 % (20 1200)  Pulse via Oximetry: 80 beats per minute (20 1200)  O2 Device: Room air (20 0328)    CXR:   CXR Results  (Last 48 hours)               20 1017  XR CHEST PORT Final result    Impression:  IMPRESSION:   1. Lines and tubes as above. Small left pleural effusion post median sternotomy           Narrative:  INDICATION:  postop heart        EXAM: Portable chest 0954 hours. No comparisons       FINDINGS: Endotracheal tube overlies the trachea at the superior margin of the   clavicles. Gastric tube extends into the upper abdomen. Its tip is not   visualized. There are mediastinal and left pleural drains. Cardiac silhouette is not enlarged. Patient is post median sternotomy. The   pulmonary vasculature is not engorged. No pneumothorax.  Small left pleural   effusion                 Admission Weight: Last Weight   Weight: 149 lb 14.6 oz (68 kg) Weight: 149 lb 14.6 oz (68 kg)     Intake / Output / Drain:  Current Shift:  0701 -  1900  In: 3436 [I.V.:1600]  Out: 1298 [Urine:815; Drains:600]  Last 24 hrs.:     Intake/Output Summary (Last 24 hours) at 2020 1207  Last data filed at 2020 1200  Gross per 24 hour   Intake 3532 ml   Output 2515 ml   Net 1017 ml       EXAM:  General:  Intubated/sedated                                                                                             Lungs:   Clear to auscultation bilaterally. Incision:  Dsg C/D/I    Heart:  Regular rate and rhythm, S1, S2 normal, no murmur, click, rub or gallop. Abdomen:   Soft, non-tender. Bowel sounds hypoactive. No masses,  No organomegaly. Extremities:  No edema. Palpable pulses bilat    Neurologic:  sedated      Labs:   Recent Labs     20  1133 20  1007   WBC  --  8.6   HGB  --  9.6*   HCT  --  29.3*   PLT  --  100*   NA  --  144   K  --  3.3*   BUN  --  13   CREA  --  1.08*   GLU  --  98   GLUCPOC 84  --    INR  --  1.2*        Assessment:     Principal Problem:    S/P aortic dissection repair (2020)      Overview: Type A Aortic Dissection Repair, 24mm Hemashield Graft      Right Femoral Cutdown for Peripheral Cannulation      DHCA, Antegrade Cerebral Perfusion    Active Problems: Aortic dissection ([address] (2020)         Plan/Recommendations/Medical Decision Makin. S/p emergent Type A dissection: BP control, SBP < 120. Currently off gtts, can use cardene gtt if needed    2. Small L pleural effusion: currently intubated per protocol, monitor. Diurese prn    3. Anemia: expected, postop. Monitor H&H and CT output    4. HTN: losartan, metoprolol XL listed on PTA med list    5. HLD: ? Takes pravastatin    6.  Hx of MR: ? Listed in chart, assessed on intra op SLOAN and no MR seen per verbal report    Dispo: remain in ICU    Signed By: Shaquille Patel NP

## 2020-02-05 NOTE — ED PROVIDER NOTES
69 y.o. female with past medical history significant for hypertension and leaking mitral valve who presents from home accompanied by her  with chief complaint of back pain. She states that she has been having constant pain in her upper back since 2 hours ago, which she describes as a \"discomfort\" and \"pressure\". She denies fall or trauma. She notes chest tightness, but denies chest pain, nausea, vomiting, or dysuria. She notes she takes daily medications for blood pressure management. There are no other acute medical concerns at this time. Social hx: denies tobacco use, denies alcohol use, denies illicit drug use  Surgical hx: none  PCP: Katarina Ceja MD    Note written by Corbin Cabello, as dictated by Rustam Dials, DO 12:14 AM        The history is provided by the patient. No  was used. Past Medical History:   Diagnosis Date    Hypertension     Mitral valve problem     leaking valve       History reviewed. No pertinent surgical history. History reviewed. No pertinent family history.     Social History     Socioeconomic History    Marital status:      Spouse name: Not on file    Number of children: Not on file    Years of education: Not on file    Highest education level: Not on file   Occupational History    Not on file   Social Needs    Financial resource strain: Not on file    Food insecurity:     Worry: Not on file     Inability: Not on file    Transportation needs:     Medical: Not on file     Non-medical: Not on file   Tobacco Use    Smoking status: Never Smoker    Smokeless tobacco: Never Used   Substance and Sexual Activity    Alcohol use: Never     Frequency: Never    Drug use: Never    Sexual activity: Not on file   Lifestyle    Physical activity:     Days per week: Not on file     Minutes per session: Not on file    Stress: Not on file   Relationships    Social connections:     Talks on phone: Not on file     Gets together: Not on file     Attends [employer] service: Not on file     Active member of club or organization: Not on file     Attends meetings of clubs or organizations: Not on file     Relationship status: Not on file    Intimate partner violence:     Fear of current or ex partner: Not on file     Emotionally abused: Not on file     Physically abused: Not on file     Forced sexual activity: Not on file   Other Topics Concern    Not on file   Social History Narrative    Not on file     ALLERGIES: Other food; [address]; Alcohol; Honeydew; [place]; and Penicillins    Review of Systems   Constitutional: Negative for appetite change, chills, fever and unexpected weight change. HENT: Negative for ear pain, hearing loss, rhinorrhea and trouble swallowing. Eyes: Negative for pain and visual disturbance. Respiratory: Positive for chest tightness. Negative for cough and shortness of breath. Cardiovascular: Negative for chest pain and palpitations. Gastrointestinal: Negative for abdominal distention, abdominal pain, blood in stool, nausea and vomiting. Genitourinary: Negative for dysuria, hematuria and urgency. Musculoskeletal: Positive for back pain. Negative for myalgias. Skin: Negative for rash. Neurological: Negative for dizziness, syncope, weakness and numbness. Psychiatric/Behavioral: Negative for confusion and suicidal ideas. All other systems reviewed and are negative. Vitals:    02/05/20 0125 02/05/20 0130 02/05/20 0156 02/05/20 0200   BP: 168/81 (!) 168/92 (!) 113/36 114/56   Pulse: 90 88 96 99   Resp: 23 16 16 21   Temp:    98.4 °F (36.9 °C)   SpO2: 98% 99% 99% 99%   Weight:       Height:                Physical Exam  Vitals signs and nursing note reviewed. Constitutional:       General: She is not in acute distress. Appearance: Normal appearance. She is well-developed. She is not ill-appearing, toxic-appearing or diaphoretic. HENT:      Head: Normocephalic and atraumatic. Right Ear: External ear normal.      Left Ear: External ear normal.      Nose: Nose normal.      Mouth/Throat:      Pharynx: No oropharyngeal exudate. Eyes:      General: No scleral icterus. Right eye: No discharge. Left eye: No discharge. Conjunctiva/sclera: Conjunctivae normal.      Pupils: Pupils are equal, round, and reactive to light. Neck:      Musculoskeletal: Normal range of motion and neck supple. Vascular: No JVD. Trachea: No tracheal deviation. Cardiovascular:      Rate and Rhythm: Normal rate and regular rhythm. Pulses:           Dorsalis pedis pulses are 2+ on the right side and 2+ on the left side. Posterior tibial pulses are 2+ on the right side and 2+ on the left side. Heart sounds: Normal heart sounds. No murmur. No friction rub. No gallop. Pulmonary:      Effort: Pulmonary effort is normal. No respiratory distress. Breath sounds: Normal breath sounds. No stridor. No decreased breath sounds, wheezing, rhonchi or rales. Chest:      Chest wall: No tenderness. Abdominal:      General: Bowel sounds are normal. There is no distension. Palpations: Abdomen is soft. Tenderness: There is no abdominal tenderness. There is no guarding or rebound. Musculoskeletal: Normal range of motion. General: No tenderness. Right lower leg: No edema. Left lower leg: No edema. Skin:     General: Skin is warm and dry. Capillary Refill: Capillary refill takes less than 2 seconds. Coloration: Skin is not pale. Findings: No erythema or rash. Neurological:      General: No focal deficit present. Mental Status: She is alert and oriented to person, place, and time. GCS: GCS eye subscore is 4. GCS verbal subscore is 5. GCS motor subscore is 6. Cranial Nerves: No cranial nerve deficit. Sensory: No sensory deficit. Motor: No weakness or abnormal muscle tone.       Coordination: Coordination normal. Deep Tendon Reflexes: Reflexes are normal and symmetric. Reflexes normal.   Psychiatric:         Behavior: Behavior normal.         Thought Content: Thought content normal.         Judgment: Judgment normal.     Note written by Corbin Harrison, as dictated by No att. providers found 12:14 AM      MDM  Number of Diagnoses or Management Options  Chest pain, unspecified type:   Dissection of thoracic aorta [facility]):      Amount and/or Complexity of Data Reviewed  Clinical lab tests: ordered and reviewed  Tests in the radiology section of CPT®: ordered and reviewed  Tests in the medicine section of CPT®: ordered and reviewed  Discuss the patient with other providers: yes (Cardiac surgery)  Independent visualization of images, tracings, or specimens: yes (EKG)    Risk of Complications, Morbidity, and/or Mortality  Presenting problems: high  Diagnostic procedures: moderate  Management options: high    Critical Care  Total time providing critical care: 30-74 minutes (Total critical care time spent exclusive of procedures:  50 minutes)    Patient Progress  Patient progress: stable (In guarded condition)         Procedures    Chief Complaint   Patient presents with    Back Pain       The patient's presenting problems have been discussed, and they are in agreement with the care plan formulated and outlined with them. I have encouraged them to ask questions as they arise throughout their visit.     MEDICATIONS GIVEN:  Medications   iopamidoL (ISOVUE-370) 76 % injection 100 mL (100 mL IntraVENous Given 2/5/20 0129)   cefTRIAXone (ROCEPHIN) 1 g in sterile water (preservative free) 10 mL IV syringe (1 g IntraVENous Given 2/5/20 0147)   fentaNYL citrate (PF) injection 50 mcg (0 mcg IntraVENous Held 2/5/20 0201)   labetaloL (NORMODYNE;TRANDATE) 20 mg/4 mL (5 mg/mL) injection 10 mg (10 mg IntraVENous Given 2/5/20 0155)       LABS REVIEWED:  Recent Results (from the past 24 hour(s))   URINALYSIS W/MICROSCOPIC    Collection Time: 02/05/20 12:29 AM   Result Value Ref Range    Color YELLOW/STRAW      Appearance CLOUDY (A) CLEAR      Specific gravity 1.022 1.003 - 1.030      pH (UA) 6.0 5.0 - 8.0      Protein 30 (A) NEG mg/dL    Glucose NEGATIVE  NEG mg/dL    Ketone NEGATIVE  NEG mg/dL    Bilirubin NEGATIVE  NEG      Blood NEGATIVE  NEG      Urobilinogen 0.2 0.2 - 1.0 EU/dL    Nitrites NEGATIVE  NEG      Leukocyte Esterase LARGE (A) NEG      WBC >100 (H) 0 - 4 /hpf    RBC 0-5 0 - 5 /hpf    Epithelial cells FEW FEW /lpf    Bacteria NEGATIVE  NEG /hpf    Mucus 2+ (A) NEG /lpf   URINE CULTURE HOLD SAMPLE    Collection Time: 02/05/20 12:29 AM   Result Value Ref Range    Urine culture hold        URINE ON HOLD IN MICROBIOLOGY DEPT FOR 3 DAYS. IF UNPRESERVED URINE IS SUBMITTED, IT CANNOT BE USED FOR ADDITIONAL TESTING AFTER 24 HRS, RECOLLECTION WILL BE REQUIRED. TROPONIN I    Collection Time: 02/05/20 12:29 AM   Result Value Ref Range    Troponin-I, Qt. <0.05 <0.05 ng/mL   CBC WITH AUTOMATED DIFF    Collection Time: 02/05/20 12:29 AM   Result Value Ref Range    WBC 8.7 3.6 - 11.0 K/uL    RBC 3.85 3.80 - 5.20 M/uL    HGB 11.8 11.5 - 16.0 g/dL    HCT 35.8 35.0 - 47.0 %    MCV 93.0 80.0 - 99.0 FL    MCH 30.6 26.0 - 34.0 PG    MCHC 33.0 30.0 - 36.5 g/dL    RDW 13.0 11.5 - 14.5 %    PLATELET 148 640 - 715 K/uL    MPV 9.9 8.9 - 12.9 FL    NRBC 0.0 0  WBC    ABSOLUTE NRBC 0.00 0.00 - 0.01 K/uL    NEUTROPHILS 69 32 - 75 %    LYMPHOCYTES 22 12 - 49 %    MONOCYTES 8 5 - 13 %    EOSINOPHILS 1 0 - 7 %    BASOPHILS 0 0 - 1 %    IMMATURE GRANULOCYTES 0 0.0 - 0.5 %    ABS. NEUTROPHILS 6.0 1.8 - 8.0 K/UL    ABS. LYMPHOCYTES 1.9 0.8 - 3.5 K/UL    ABS. MONOCYTES 0.7 0.0 - 1.0 K/UL    ABS. EOSINOPHILS 0.1 0.0 - 0.4 K/UL    ABS. BASOPHILS 0.0 0.0 - 0.1 K/UL    ABS. IMM.  GRANS. 0.0 0.00 - 0.04 K/UL    DF AUTOMATED     METABOLIC PANEL, COMPREHENSIVE    Collection Time: 02/05/20 12:29 AM   Result Value Ref Range    Sodium 141 136 - 145 mmol/L    Potassium 3.4 (L) 3.5 - 5.1 mmol/L    Chloride 108 97 - 108 mmol/L    CO2 26 21 - 32 mmol/L    Anion gap 7 5 - 15 mmol/L    Glucose 116 (H) 65 - 100 mg/dL    BUN 15 6 - 20 MG/DL    Creatinine 0.88 0.55 - 1.02 MG/DL    BUN/Creatinine ratio 17 12 - 20      GFR est AA >60 >60 ml/min/1.73m2    GFR est non-AA >60 >60 ml/min/1.73m2    Calcium 8.6 8.5 - 10.1 MG/DL    Bilirubin, total 0.4 0.2 - 1.0 MG/DL    ALT (SGPT) 34 12 - 78 U/L    AST (SGOT) 24 15 - 37 U/L    Alk. phosphatase 59 45 - 117 U/L    Protein, total 6.6 6.4 - 8.2 g/dL    Albumin 3.6 3.5 - 5.0 g/dL    Globulin 3.0 2.0 - 4.0 g/dL    A-G Ratio 1.2 1.1 - 2.2     LIPASE    Collection Time: 02/05/20 12:29 AM   Result Value Ref Range    Lipase 352 73 - 393 U/L   SAMPLES BEING HELD    Collection Time: 02/05/20 12:29 AM   Result Value Ref Range    SAMPLES BEING HELD RD,FRANK     COMMENT        Add-on orders for these samples will be processed based on acceptable specimen integrity and analyte stability, which may vary by analyte.    TYPE & SCREEN    Collection Time: 02/05/20  1:32 AM   Result Value Ref Range    Crossmatch Expiration 02/08/2020     ABO/Rh(D) A POSITIVE     Antibody screen NEG    TYPE + CROSSMATCH    Collection Time: 02/05/20  3:05 AM   Result Value Ref Range    Crossmatch Expiration 02/08/2020     ABO/Rh(D) A POSITIVE     Antibody screen PENDING    PLATELETS, ALLOCATE    Collection Time: 02/05/20  3:30 AM   Result Value Ref Range    Unit number [id number]     Blood component type [facility] LR,PAS2     Unit division 00     Status of unit ALLOCATED        VITAL SIGNS:  Patient Vitals for the past 24 hrs:   Temp Pulse Resp BP SpO2   02/05/20 0200 98.4 °F (36.9 °C) 99 21 114/56 99 %   02/05/20 0156  96 16 (!) 113/36 99 %   02/05/20 0130  88 16 (!) 168/92 99 %   02/05/20 0125  90 23 168/81 98 %   02/05/20 0100  62 15 151/68 100 %   02/05/20 0045  69 17 139/76 100 %   02/05/20 0007 97.6 °F (36.4 °C) 65 16 131/54 100 %       RADIOLOGY RESULTS:  The following have been ordered and reviewed:  Cta Chest Abd Pelv W Cont    Result Date: 2/5/2020  EXAM:  CTA chest abdomen pelvis with contrast INDICATION: Thoracic back pain. Chest pain. COMPARISON: None. TECHNIQUE: Helical thin section chest, abdomen, and pelvis CT following uneventful intravenous administration of nonionic contrast. Coronal and sagittal reformats were performed. 3D/MIP post processing was performed. CT dose reduction was achieved through use of a standardized protocol tailored for this examination and automatic exposure control for dose modulation. FINDINGS: There is an aortic dissection extending from the aortic root to the iliac bifurcation. The dissection extends into the origin of the right innominate artery. The ascending thoracic aorta measures 4.3 cm in diameter. The aortic arch measures 3.4 cm in diameter. The mid descending thoracic aorta measures 3.3 cm in diameter. There is no focal aneurysmal dilatation of the thoracic or abdominal aorta. The three-vessel aortic arch origins are patent. The celiac, superior mesenteric, renal, and inferior mesenteric artery origins are patent. The visualized thyroid gland is unremarkable. The main pulmonary artery is normal in caliber. There is no acute pulmonary embolism. There is mild cardiomegaly. No pericardial effusion. No lymphadenopathy by imaging size criteria. There is mild bilateral dependent atelectasis. There is no lung mass or consolidation. No pleural effusion or pneumothorax. Central airways are unremarkable. A 1.8 cm liver lesion with discontinuous peripheral enhancement is suggestive of a hemangioma. A smaller hyperdense lesion in the right liver may also represent an hemangioma. The spleen, pancreas, and adrenal glands are normal. The gall bladder is present  without intra- or extra-hepatic biliary dilatation. There is an area of hypoenhancement in the upper right kidney. The left kidney enhances uniformly. There is no hydronephrosis.  There are no dilated bowel loops. The appendix is normal.  There are no enlarged lymph nodes. There is no free fluid or free air. The urinary bladder is normal.  There is no pelvic mass. Is no aggressive bony lesion. IMPRESSION: 1. Type A aortic dissection extending from the aortic root to the iliac bifurcation. 2. Hypoenhancement in the upper right kidney may represent infarcts, scarring, or infection. The findings were discussed with Dr. Sancho Ervin on 2/5/2020 at 0125 hours by Dr. Esperanza Bernard. [phone]       ED EKG interpretation:  Rhythm: normal sinus rhythm; and regular . Rate (approx.): 64; Axis: normal; P wave: normal; QRS interval: normal ; ST/T wave: non-specific changes; Other findings: abnormal ekg. This EKG was interpreted by Miles Decker DO, ED Provider. CONSULTATIONS:   CONSULT NOTE:  1:35 AM Miles Decker DO spoke with Dr. Fina Montoya, Consult for cardiac surgery. Discussed available diagnostic tests and clinical findings. He is in agreement with care plans as outlined. Dr. Fina Montoya recommends urgent transfer to [facility] and start Cardene drip to keep the blood pressure between 924-104 systolic. PROGRESS NOTES:  Patient transferred to [facility] in guarded condition via EMS. Patient will take straight to the OR for surgical management of her dissection. DIAGNOSIS:    1. Dissection of thoracic aorta ([address]    2. Chest pain, unspecified type        PLAN:  Transfer to [facility]    ED COURSE: The patient's hospital course has been uncomplicated.

## 2020-02-05 NOTE — PROGRESS NOTES
Cardiac Surgery Care Coordinator- Met with Mr Kenrick Lisa, reviewed plan of care and provided update from the OR. Will continue to follow for educational and emotional support. 0930- Met with the family of Cortez Armijo, provided update. 0945- Met with Anny Wilkerson family and Dr. Terri Liu. Update given, Encouraged family to verbalize and offered emotional support. Family escorted to the fourth floor waiting room. 4324- Escorted family to the bedside, reviewed plan of care and encouraged him to verbalize. He stated he will be waiting in the fourth floor waiting room for his son. Will continue to follow.

## 2020-02-05 NOTE — DIABETES MGMT
[facility]  CLINICAL NURSE SPECIALIST CONSULT  PROGRAM FOR DIABETES HEALTH    Presentation   Oswadl Rose is a 69 y.o. female admitted with type A aortic dissection. Current clinical course has been complicated by aortic dissection repair with a right femoral cutdown. Patient does not have diabetes. Consulted by Provider for advanced diabetes nursing assessment and care, specifically related to   [x] Transitioning off Hipoltio Wong is a 69 y.o. female who was referred for cardiac surgery evaluation of Aortic Dissection by [facility] ED. PMHx significant for HTN & mitral regurgitation of unknown significance. Patient presented to the ED with several hours of chest pain that began abruptly while at home. The pain was described as sharp/pressure, radiated into her back. CT scan of her chest, abdomen, and pelvis were done showing a type A aortic dissection from the aortic root to the iliac bifurcation. She was emergently transferred here for surgical repair. Objective   Physical exam  General Sedated and intubated  Vital Signs   Visit Vitals  /51   Pulse 76   Temp 97.6 °F (36.4 °C)   Resp 14   Ht 5' 4\" (1.626 m)   Wt 68 kg (149 lb 14.6 oz)   SpO2 98%   BMI 25.73 kg/m²   . Orthostatic BP measurement not indicated  Skin  Warm and dry. Neck   SANDIP due to CVL and intubation status  Heart   Regular rate and rhythm. No murmurs, rubs or gallops  Lungs  Clear without rales or rhonchi  Extremities Diabetic foot exam:    Left Foot     Visual Exam: normal    Pulse DP: 2+ (normal)   Vibratory and Filament test: deferred due to clinical condition    Right Foot   Visual Exam: normal    Pulse DP: 2+ (normal)   Vibratory and Filament test: deferred due to clinical condition  .      Laboratory  No results found for: HBA1C, HGBE8, NXS0PHAJ, ALA9MANC  No results found for: LDL, LDLC, DLDLP  Lab Results   Component Value Date/Time    Creatinine 1.08 (H) 02/05/2020 10:07 AM     Lab Results Component Value Date/Time    Sodium 144 02/05/2020 10:07 AM    Potassium 3.3 (L) 02/05/2020 10:07 AM    Chloride 113 (H) 02/05/2020 10:07 AM    CO2 26 02/05/2020 10:07 AM    Anion gap 5 02/05/2020 10:07 AM    Glucose 98 02/05/2020 10:07 AM    BUN 13 02/05/2020 10:07 AM    Creatinine 1.08 (H) 02/05/2020 10:07 AM    BUN/Creatinine ratio 12 02/05/2020 10:07 AM    GFR est AA >60 02/05/2020 10:07 AM    GFR est non-AA 50 (L) 02/05/2020 10:07 AM    Calcium 8.5 02/05/2020 10:07 AM    Bilirubin, total 0.6 02/05/2020 10:07 AM    AST (SGOT) 46 (H) 02/05/2020 10:07 AM    Alk. phosphatase 40 (L) 02/05/2020 10:07 AM    Protein, total 5.6 (L) 02/05/2020 10:07 AM    Albumin 3.3 (L) 02/05/2020 10:07 AM    Globulin 2.3 02/05/2020 10:07 AM    A-G Ratio 1.4 02/05/2020 10:07 AM    ALT (SGPT) 37 02/05/2020 10:07 AM     Lab Results   Component Value Date/Time    ALT (SGPT) 37 02/05/2020 10:07 AM       Blood glucose pattern      Assessment and Plan   Nursing Diagnosis Risk for unstable blood glucose pattern   Nursing Intervention Domain 5250 Decision-making Support   Nursing Interventions Examined current inpatient blood glucose control   Explored factors facilitating and impeding inpatient management  Explored corrective strategies with responsible inpatient provider      Evaluation   This woman has achieved inpatient blood glucose target of 100-180mg/dl. Regular insulin infusion in place. Recommendations   1. Continue insulin infusion per protocol. Started 2/5 at 0400    2.  When transitioning off insulin infusion, POC Q6 hours  If blood glucose reaches over 200, initiate hyperglycemia management with:    Basal insulin   O 0.2  units/kg/D    Bolus insulin  O Normal sensitivity: 6 units Humalog with meals    Corrective insulin  O Normal sensitivity  Correctional Scale for Normal Sensitivity    200-249: 2 units Humalog  250-299: 4 units Humalog  300-349: 6 units Humalog  350-399: 8 units Humalog  Over 400: 10 units Humalog    Do NOT hold for NPO; give in addition to meal time insulin dose. If patient does not eat, give correction dose only.       3. Received blood products- A1C not going to be accurate      Billing Code(s)      [x] [id number] IP subsequent hospital care       Kareem Hernandez RN CNS  [phone]

## 2020-02-05 NOTE — ED NOTES
Report called to [address] [place] [address] [facility] at [address]. Patient to be transported from [facility] to the main OR at [facility].

## 2020-02-05 NOTE — ANESTHESIA PREPROCEDURE EVALUATION
Relevant Problems   No relevant active problems       Anesthetic History   No history of anesthetic complications            Review of Systems / Medical History  Patient summary reviewed, nursing notes reviewed and pertinent labs reviewed    Pulmonary  Within defined limits                 Neuro/Psych   Within defined limits           Cardiovascular    Hypertension              Exercise tolerance: <4 METS  Comments: Type A aortic dissection   GI/Hepatic/Renal  Within defined limits              Endo/Other  Within defined limits           Other Findings              Physical Exam    Airway  Mallampati: III  TM Distance: 4 - 6 cm  Neck ROM: normal range of motion   Mouth opening: Normal     Cardiovascular    Rhythm: regular  Rate: normal         Dental    Dentition: Upper partial plate     Pulmonary  Breath sounds clear to auscultation               Abdominal  GI exam deferred       Other Findings            Anesthetic Plan    ASA: 4, emergent  Anesthesia type: general    Monitoring Plan: Arterial line, BIS, CVP, Dixon-Giovani and SLOAN    Post procedure ventilation   Induction: Intravenous  Anesthetic plan and risks discussed with: Patient

## 2020-02-05 NOTE — ANESTHESIA PROCEDURE NOTES
SLO[facility]  Date/Time: 2/5/2020 5:11 AM      Procedure Details: probe placement, image aquisition & interpretation    Risks and benefits discussed with the patient and plans are to proceed    Procedure Note    Performed by: Tori Lr MD  Authorized by:  Tori Lr MD       Indications: assessment of ascending aorta and assessment of surgical repair  Modalities: 2D, CF, CWD, PWD  Probe Type: multiplane  Insertion: atraumatic  Patient Status: intubated and sedated    Echocardiographic and Doppler Measurements   Aorta  Size  Diam(cm)  Dissection PlaqueThick(mm)  Plaque Mobile    Ascending dilated  Yes      Arch dilated  Yes      Descending normal  Yes            Valves  Annulus  Stenosis  Area/Grad  Regurg  Leaflet   Morph  Leaflet   Motion    Aortic normal none  1+ normal normal    Mitral normal none  1+ normal normal    Tricuspid normal none  2+ normal normal          Atria  Size  SEC (smoke)  Thrombus  Tumor  Device    Rt Atrium normal No No No No    Lt Atrium normal No No No No     Interatrial Septum Morphology: normal    Interventricular Septum Morphology: normal    Ventricle  Cavity Size  Cavity Dimension Hypertrophy  Thrombus  Gloal FXN  EF    RV normal  No no normal     LV normal  Yes No normal 70%       Regional Function  (1 = normal, 2 = mildly hypokinetic, 3 = severely hypokinetic, 4 = akinetic, 5 = dyskinetic) LAV - [place]   ME LAV = 0  ME LAV = 90  ME LAV = 130   Basal Sept:1 Basal Ant:1 Basal Post:1   Mid Sept:1 Mid Ant:1 Mid Post:1   Apical Sept:1 Apical Ant:1 Basal Ant Sept:1   Basal Lat:1 Basal Inf:1 Mid Ant Sept:1   Mid Lat:1 Mid Inf:1    Apical Lat:1 Apical Inf:1            Post Intervention Follow-up Study  Ventricular Global Function: unchanged  Ventricular Regional Function: unchanged     Valve  Function  Regurgitation  Area    Aortic improved 0     Mitral no change      Tricuspid no change      Prosthetic        Complications: None  Comments: Large intramural hematoma in the ascending aorta down into the aortic root. Aortic valve architecture appears normal, Large dissection flap and ?entry point seen in the distal arch, dissection/hematoma seen throughout descending aorta.     Ascending aortic tube graft- trace residual AI

## 2020-02-06 ENCOUNTER — APPOINTMENT (OUTPATIENT)
Dept: GENERAL RADIOLOGY | Age: 75
DRG: 220 | End: 2020-02-06
Attending: PHYSICIAN ASSISTANT
Payer: MEDICARE

## 2020-02-06 LAB
ABO + RH BLD: NORMAL
ADMINISTERED INITIALS, ADMINIT: NORMAL
ALBUMIN SERPL-MCNC: 3.6 G/DL (ref 3.5–5)
ALBUMIN/GLOB SERPL: 1.7 {RATIO} (ref 1.1–2.2)
ALP SERPL-CCNC: 32 U/L (ref 45–117)
ALT SERPL-CCNC: 29 U/L (ref 12–78)
ANION GAP SERPL CALC-SCNC: 5 MMOL/L (ref 5–15)
ARTERIAL PATENCY WRIST A: ABNORMAL
AST SERPL-CCNC: 43 U/L (ref 15–37)
BACTERIA SPEC CULT: NORMAL
BASE DEFICIT BLD-SCNC: 1 MMOL/L
BDY SITE: ABNORMAL
BILIRUB SERPL-MCNC: 0.5 MG/DL (ref 0.2–1)
BLD PROD TYP BPU: NORMAL
BLOOD GROUP ANTIBODIES SERPL: NORMAL
BPU ID: NORMAL
BUN SERPL-MCNC: 18 MG/DL (ref 6–20)
BUN/CREAT SERPL: 22 (ref 12–20)
CA-I BLD-SCNC: 1.2 MMOL/L (ref 1.12–1.32)
CALCIUM SERPL-MCNC: 8.3 MG/DL (ref 8.5–10.1)
CARB RATIO, CHOR: 15
CARBOHYDRATE EATEN, CHO: 15 G
CC UR VC: NORMAL
CHLORIDE SERPL-SCNC: 117 MMOL/L (ref 97–108)
CO2 SERPL-SCNC: 25 MMOL/L (ref 21–32)
CREAT SERPL-MCNC: 0.81 MG/DL (ref 0.55–1.02)
CROSSMATCH RESULT,%XM: NORMAL
D50 ADMINISTERED, D50ADM: 0 ML
D50 ORDER, D50ORD: 0 ML
ERYTHROCYTE [DISTWIDTH] IN BLOOD BY AUTOMATED COUNT: 13.5 % (ref 11.5–14.5)
EST. AVERAGE GLUCOSE BLD GHB EST-MCNC: 126 MG/DL
GAS FLOW.O2 O2 DELIVERY SYS: ABNORMAL L/MIN
GAS FLOW.O2 SETTING OXYMISER: 6 L/M
GLOBULIN SER CALC-MCNC: 2.1 G/DL (ref 2–4)
GLSCOM COMMENTS: NORMAL
GLUCOSE BLD STRIP.AUTO-MCNC: 101 MG/DL (ref 65–100)
GLUCOSE BLD STRIP.AUTO-MCNC: 104 MG/DL (ref 65–100)
GLUCOSE BLD STRIP.AUTO-MCNC: 104 MG/DL (ref 65–100)
GLUCOSE BLD STRIP.AUTO-MCNC: 106 MG/DL (ref 65–100)
GLUCOSE BLD STRIP.AUTO-MCNC: 106 MG/DL (ref 65–100)
GLUCOSE BLD STRIP.AUTO-MCNC: 112 MG/DL (ref 65–100)
GLUCOSE BLD STRIP.AUTO-MCNC: 113 MG/DL (ref 65–100)
GLUCOSE BLD STRIP.AUTO-MCNC: 126 MG/DL (ref 65–100)
GLUCOSE BLD STRIP.AUTO-MCNC: 141 MG/DL (ref 65–100)
GLUCOSE BLD STRIP.AUTO-MCNC: 89 MG/DL (ref 65–100)
GLUCOSE BLD STRIP.AUTO-MCNC: 90 MG/DL (ref 65–100)
GLUCOSE BLD STRIP.AUTO-MCNC: 91 MG/DL (ref 65–100)
GLUCOSE BLD STRIP.AUTO-MCNC: 94 MG/DL (ref 65–100)
GLUCOSE BLD STRIP.AUTO-MCNC: 96 MG/DL (ref 65–100)
GLUCOSE BLD STRIP.AUTO-MCNC: 98 MG/DL (ref 65–100)
GLUCOSE BLD STRIP.AUTO-MCNC: 99 MG/DL (ref 65–100)
GLUCOSE BLD STRIP.AUTO-MCNC: 99 MG/DL (ref 65–100)
GLUCOSE SERPL-MCNC: 87 MG/DL (ref 65–100)
GLUCOSE, GLC: 101 MG/DL
GLUCOSE, GLC: 104 MG/DL
GLUCOSE, GLC: 104 MG/DL
GLUCOSE, GLC: 106 MG/DL
GLUCOSE, GLC: 106 MG/DL
GLUCOSE, GLC: 112 MG/DL
GLUCOSE, GLC: 113 MG/DL
GLUCOSE, GLC: 126 MG/DL
GLUCOSE, GLC: 141 MG/DL
GLUCOSE, GLC: 89 MG/DL
GLUCOSE, GLC: 90 MG/DL
GLUCOSE, GLC: 91 MG/DL
GLUCOSE, GLC: 94 MG/DL
GLUCOSE, GLC: 96 MG/DL
GLUCOSE, GLC: 96 MG/DL
GLUCOSE, GLC: 98 MG/DL
GLUCOSE, GLC: 99 MG/DL
GLUCOSE, GLC: 99 MG/DL
HBA1C MFR BLD: 6 % (ref 4–5.6)
HCO3 BLD-SCNC: 23.6 MMOL/L (ref 22–26)
HCT VFR BLD AUTO: 22.6 % (ref 35–47)
HGB BLD-MCNC: 7.4 G/DL (ref 11.5–16)
HIGH TARGET, HITG: 130 MG/DL
INSULIN ADMINSTERED, INSADM: 0.7 UNITS/HOUR
INSULIN ADMINSTERED, INSADM: 0.9 UNITS/HOUR
INSULIN ADMINSTERED, INSADM: 1 UNITS/HOUR
INSULIN ADMINSTERED, INSADM: 1 UNITS/HOUR
INSULIN ADMINSTERED, INSADM: 1.3 UNITS/HOUR
INSULIN ADMINSTERED, INSADM: 1.5 UNITS/HOUR
INSULIN ADMINSTERED, INSADM: 1.6 UNITS/HOUR
INSULIN ADMINSTERED, INSADM: 1.6 UNITS/HOUR
INSULIN ADMINSTERED, INSADM: 1.7 UNITS/HOUR
INSULIN ADMINSTERED, INSADM: 1.8 UNITS/HOUR
INSULIN ADMINSTERED, INSADM: 1.8 UNITS/HOUR
INSULIN ADMINSTERED, INSADM: 1.9 UNITS/HOUR
INSULIN ADMINSTERED, INSADM: 1.9 UNITS/HOUR
INSULIN ADMINSTERED, INSADM: 2 UNITS/HOUR
INSULIN ADMINSTERED, INSADM: 2 UNITS/HOUR
INSULIN ADMINSTERED, INSADM: 2.1 UNITS/HOUR
INSULIN ADMINSTERED, INSADM: 2.3 UNITS/HOUR
INSULIN ADMINSTERED, INSADM: 3.6 UNITS/HOUR
INSULIN BOLUS ADMINISTERED, INSBOLADM: 1 UNITS/HOUR
INSULIN BOLUS ORDERED, INSBOLORD: 1 UNITS/HOUR
INSULIN ORDER, INSORD: 0.7 UNITS/HOUR
INSULIN ORDER, INSORD: 0.9 UNITS/HOUR
INSULIN ORDER, INSORD: 1 UNITS/HOUR
INSULIN ORDER, INSORD: 1 UNITS/HOUR
INSULIN ORDER, INSORD: 1.3 UNITS/HOUR
INSULIN ORDER, INSORD: 1.5 UNITS/HOUR
INSULIN ORDER, INSORD: 1.6 UNITS/HOUR
INSULIN ORDER, INSORD: 1.6 UNITS/HOUR
INSULIN ORDER, INSORD: 1.7 UNITS/HOUR
INSULIN ORDER, INSORD: 1.8 UNITS/HOUR
INSULIN ORDER, INSORD: 1.8 UNITS/HOUR
INSULIN ORDER, INSORD: 1.9 UNITS/HOUR
INSULIN ORDER, INSORD: 1.9 UNITS/HOUR
INSULIN ORDER, INSORD: 2 UNITS/HOUR
INSULIN ORDER, INSORD: 2 UNITS/HOUR
INSULIN ORDER, INSORD: 2.1 UNITS/HOUR
INSULIN ORDER, INSORD: 2.3 UNITS/HOUR
INSULIN ORDER, INSORD: 3.6 UNITS/HOUR
LOW TARGET, LOT: 95 MG/DL
MAGNESIUM SERPL-MCNC: 2.3 MG/DL (ref 1.6–2.4)
MCH RBC QN AUTO: 30.8 PG (ref 26–34)
MCHC RBC AUTO-ENTMCNC: 32.7 G/DL (ref 30–36.5)
MCV RBC AUTO: 94.2 FL (ref 80–99)
MINUTES UNTIL NEXT BG, NBG: 120 MIN
MINUTES UNTIL NEXT BG, NBG: 60 MIN
MULTIPLIER, MUL: 0.02
MULTIPLIER, MUL: 0.02
MULTIPLIER, MUL: 0.03
MULTIPLIER, MUL: 0.04
MULTIPLIER, MUL: 0.06
NRBC # BLD: 0 K/UL (ref 0–0.01)
NRBC BLD-RTO: 0 PER 100 WBC
ORDER INITIALS, ORDINIT: NORMAL
PCO2 BLD: 38 MMHG (ref 35–45)
PH BLD: 7.4 [PH] (ref 7.35–7.45)
PLATELET # BLD AUTO: 90 K/UL (ref 150–400)
PMV BLD AUTO: 11.2 FL (ref 8.9–12.9)
PO2 BLD: 31 MMHG (ref 80–100)
POTASSIUM SERPL-SCNC: 3.8 MMOL/L (ref 3.5–5.1)
PROT SERPL-MCNC: 5.7 G/DL (ref 6.4–8.2)
RBC # BLD AUTO: 2.4 M/UL (ref 3.8–5.2)
SAO2 % BLD: 60 % (ref 92–97)
SERVICE CMNT-IMP: ABNORMAL
SERVICE CMNT-IMP: NORMAL
SODIUM SERPL-SCNC: 147 MMOL/L (ref 136–145)
SPECIMEN EXP DATE BLD: NORMAL
SPECIMEN TYPE: ABNORMAL
STATUS OF UNIT,%ST: NORMAL
UNIT DIVISION, %UDIV: 0
WBC # BLD AUTO: 15.5 K/UL (ref 3.6–11)

## 2020-02-06 PROCEDURE — 97530 THERAPEUTIC ACTIVITIES: CPT

## 2020-02-06 PROCEDURE — 74011000250 HC RX REV CODE- 250: Performed by: PHYSICIAN ASSISTANT

## 2020-02-06 PROCEDURE — 97166 OT EVAL MOD COMPLEX 45 MIN: CPT

## 2020-02-06 PROCEDURE — 74011250636 HC RX REV CODE- 250/636: Performed by: THORACIC SURGERY (CARDIOTHORACIC VASCULAR SURGERY)

## 2020-02-06 PROCEDURE — 77010033678 HC OXYGEN DAILY

## 2020-02-06 PROCEDURE — 74011250636 HC RX REV CODE- 250/636: Performed by: PHYSICIAN ASSISTANT

## 2020-02-06 PROCEDURE — 85027 COMPLETE CBC AUTOMATED: CPT

## 2020-02-06 PROCEDURE — 71045 X-RAY EXAM CHEST 1 VIEW: CPT

## 2020-02-06 PROCEDURE — 36415 COLL VENOUS BLD VENIPUNCTURE: CPT

## 2020-02-06 PROCEDURE — 83735 ASSAY OF MAGNESIUM: CPT

## 2020-02-06 PROCEDURE — 82803 BLOOD GASES ANY COMBINATION: CPT

## 2020-02-06 PROCEDURE — 65660000001 HC RM ICU INTERMED STEPDOWN

## 2020-02-06 PROCEDURE — 65610000003 HC RM ICU SURGICAL

## 2020-02-06 PROCEDURE — 83036 HEMOGLOBIN GLYCOSYLATED A1C: CPT

## 2020-02-06 PROCEDURE — 82962 GLUCOSE BLOOD TEST: CPT

## 2020-02-06 PROCEDURE — 74011636637 HC RX REV CODE- 636/637: Performed by: PHYSICIAN ASSISTANT

## 2020-02-06 PROCEDURE — 74011250637 HC RX REV CODE- 250/637: Performed by: NURSE PRACTITIONER

## 2020-02-06 PROCEDURE — 80053 COMPREHEN METABOLIC PANEL: CPT

## 2020-02-06 PROCEDURE — 97161 PT EVAL LOW COMPLEX 20 MIN: CPT

## 2020-02-06 PROCEDURE — 74011250637 HC RX REV CODE- 250/637: Performed by: PHYSICIAN ASSISTANT

## 2020-02-06 RX ORDER — METOPROLOL TARTRATE 25 MG/1
25 TABLET, FILM COATED ORAL EVERY 12 HOURS
Status: DISCONTINUED | OUTPATIENT
Start: 2020-02-06 | End: 2020-02-07

## 2020-02-06 RX ORDER — PRAVASTATIN SODIUM 20 MG/1
20 TABLET ORAL DAILY
Status: DISCONTINUED | OUTPATIENT
Start: 2020-02-07 | End: 2020-02-12 | Stop reason: HOSPADM

## 2020-02-06 RX ORDER — HYDRALAZINE HYDROCHLORIDE 20 MG/ML
10 INJECTION INTRAMUSCULAR; INTRAVENOUS
Status: DISCONTINUED | OUTPATIENT
Start: 2020-02-06 | End: 2020-02-12 | Stop reason: HOSPADM

## 2020-02-06 RX ORDER — POTASSIUM CHLORIDE 29.8 MG/ML
20 INJECTION INTRAVENOUS
Status: DISPENSED | OUTPATIENT
Start: 2020-02-06 | End: 2020-02-06

## 2020-02-06 RX ADMIN — SENNOSIDES AND DOCUSATE SODIUM 1 TABLET: 8.6; 5 TABLET ORAL at 08:00

## 2020-02-06 RX ADMIN — SODIUM CHLORIDE 5 MG/HR: 900 INJECTION, SOLUTION INTRAVENOUS at 03:48

## 2020-02-06 RX ADMIN — BACITRACIN 1 PACKET: 500 OINTMENT TOPICAL at 10:29

## 2020-02-06 RX ADMIN — Medication 2 G: at 07:30

## 2020-02-06 RX ADMIN — Medication 2 G: at 01:24

## 2020-02-06 RX ADMIN — Medication 10 ML: at 07:16

## 2020-02-06 RX ADMIN — HYDROMORPHONE HYDROCHLORIDE 0.5 MG: 1 INJECTION, SOLUTION INTRAMUSCULAR; INTRAVENOUS; SUBCUTANEOUS at 04:22

## 2020-02-06 RX ADMIN — ACETAMINOPHEN 1000 MG: 10 INJECTION, SOLUTION INTRAVENOUS at 18:15

## 2020-02-06 RX ADMIN — SODIUM CHLORIDE 5 MG/HR: 900 INJECTION, SOLUTION INTRAVENOUS at 01:45

## 2020-02-06 RX ADMIN — PANTOPRAZOLE SODIUM 40 MG: 40 TABLET, DELAYED RELEASE ORAL at 07:55

## 2020-02-06 RX ADMIN — SENNOSIDES AND DOCUSATE SODIUM 1 TABLET: 8.6; 5 TABLET ORAL at 18:20

## 2020-02-06 RX ADMIN — Medication 10 ML: at 18:16

## 2020-02-06 RX ADMIN — POTASSIUM CHLORIDE 20 MEQ: 400 INJECTION, SOLUTION INTRAVENOUS at 07:56

## 2020-02-06 RX ADMIN — OXYCODONE 5 MG: 5 TABLET ORAL at 12:21

## 2020-02-06 RX ADMIN — INSULIN LISPRO 1 UNITS: 100 INJECTION, SOLUTION INTRAVENOUS; SUBCUTANEOUS at 08:52

## 2020-02-06 RX ADMIN — MUPIROCIN: 20 OINTMENT TOPICAL at 10:30

## 2020-02-06 RX ADMIN — Medication 2 G: at 18:20

## 2020-02-06 RX ADMIN — CHLORHEXIDINE GLUCONATE 10 ML: 1.2 RINSE ORAL at 22:24

## 2020-02-06 RX ADMIN — SODIUM CHLORIDE 5 MG/HR: 900 INJECTION, SOLUTION INTRAVENOUS at 07:36

## 2020-02-06 RX ADMIN — METOPROLOL TARTRATE 25 MG: 25 TABLET ORAL at 20:31

## 2020-02-06 RX ADMIN — ASPIRIN 81 MG 81 MG: 81 TABLET ORAL at 08:00

## 2020-02-06 RX ADMIN — METOPROLOL TARTRATE 25 MG: 25 TABLET ORAL at 08:00

## 2020-02-06 RX ADMIN — CHLORHEXIDINE GLUCONATE 10 ML: 1.2 RINSE ORAL at 10:30

## 2020-02-06 RX ADMIN — ACETAMINOPHEN 1000 MG: 10 INJECTION, SOLUTION INTRAVENOUS at 11:13

## 2020-02-06 RX ADMIN — POLYETHYLENE GLYCOL 3350 17 G: 17 POWDER, FOR SOLUTION ORAL at 08:00

## 2020-02-06 RX ADMIN — Medication 2 G: at 12:21

## 2020-02-06 RX ADMIN — ACETAMINOPHEN 1000 MG: 10 INJECTION, SOLUTION INTRAVENOUS at 04:22

## 2020-02-06 RX ADMIN — OXYCODONE 5 MG: 5 TABLET ORAL at 07:55

## 2020-02-06 RX ADMIN — BACITRACIN 1 PACKET: 500 OINTMENT TOPICAL at 10:28

## 2020-02-06 RX ADMIN — HYDROMORPHONE HYDROCHLORIDE 0.5 MG: 1 INJECTION, SOLUTION INTRAMUSCULAR; INTRAVENOUS; SUBCUTANEOUS at 01:24

## 2020-02-06 RX ADMIN — MUPIROCIN: 20 OINTMENT TOPICAL at 18:20

## 2020-02-06 RX ADMIN — ACETAMINOPHEN 1000 MG: 10 INJECTION, SOLUTION INTRAVENOUS at 22:23

## 2020-02-06 RX ADMIN — MAGNESIUM OXIDE TAB 400 MG (241.3 MG ELEMENTAL MG) 400 MG: 400 (241.3 MG) TAB at 18:20

## 2020-02-06 RX ADMIN — AMIODARONE HYDROCHLORIDE 400 MG: 200 TABLET ORAL at 18:20

## 2020-02-06 NOTE — PROGRESS NOTES
Problem: Self Care Deficits Care Plan (Adult)  Goal: *Acute Goals and Plan of Care (Insert Text)  Description  FUNCTIONAL STATUS PRIOR TO ADMISSION: Patient was independent and active without use of DME.     HOME SUPPORT: The patient lived with  but did not need assistance.  and pt split household tasks,  cooked. Both are retired. Occupational Therapy Goals  Initiated 2/6/2020  1. Patient will perform ADLs standing 5 mins without fatigue or LOB with supervision/set-up within 7 day(s). 2.  Patient will perform lower body ADLs with supervision/set-up within 7 day(s). 3.  Patient will perform gathering ADL items high and low 2/2 with supervision/set-up within 7 day(s). 4.  Patient will perform toilet transfers with supervision/set-up within 7 day(s). 5.  Patient will perform all aspects of toileting with supervision/set-up within 7 day(s). 6.  Patient will participate in cardiac/sternal upper extremity therapeutic exercise/activities to increase independence with ADLs with supervision/set-up for 5 minutes within 7 day(s). Outcome: Progressing Towards Goal    OCCUPATIONAL THERAPY EVALUATION  Patient: Giuseppe Saravia (26 y.o. female)  Date: 2/6/2020  Primary Diagnosis: Aortic dissection ([facility] [address] [I71.00]  Procedure(s) (LRB):  REPAIR TYPE A  AORTIC DISSECTION, DEEP HYPOTHERMIC CIRCULATORY ARREST, RIGHT FEMORAL CUTDOWN, ECC, SLOAN AND EPIAORTIC U/S BY DR TOLEDO SLOAN BY DR. Misa Barrett. (N/A) 1 Day Post-Op   Precautions:   Sternal    ASSESSMENT  Based on the objective data described below, the patient presents with new sternal precautions, drowsiness, expected post op pain, limitations wti movements due to lines/leads and decline in functional status s/p POD 1 repair of type A aortic dissection. Pt needing min A x 2 to stand with verbal cues for hand placement. Overall pt weak and c/o fatigue during session but expect to progress well once lines/leads removed.  ABP stable pre and post activity and SvO2 with brief drop from 52% at rest to 38% with pt recovering within a minute with supine rest.     Patient is verbalizing understanding of mindful-based movements (\"move in the tube\") principles of keeping UEs proximal to ribcage to prevent lateral pull on the sternum during load-bearing activities with visual and verbal cues required for compliance. Pt will need reinforcement next session due to drowsiness upon evaluation. Current Level of Function Impacting Discharge (ADLs/self-care): limited by lines/leads, anxiety, weakness and incisional pain     Functional Outcome Measure: The patient scored 30/100 on the barthel outcome measure which is indicative of 70% impairment with ADLs and mobility . Other factors to consider for discharge: lives with , active prior, pt loves to swim almost daily! Patient will benefit from skilled therapy intervention to address the above noted impairments. PLAN :  Recommendations and Planned Interventions: self care training, functional mobility training, therapeutic exercise, balance training, endurance activities, patient education, home safety training, and family training/education    Frequency/Duration: Patient will be followed by occupational therapy 5 times a week to address goals. Recommendation for discharge: (in order for the patient to meet his/her long term goals)  No skilled occupational therapy/ follow up rehabilitation needs identified at this time. OP cardiac rehab    This discharge recommendation:  Has not yet been discussed the attending provider and/or case management    IF patient discharges home will need the following DME: none       SUBJECTIVE:   Patient stated I am just so tired.     OBJECTIVE DATA SUMMARY:   HISTORY:   Past Medical History:   Diagnosis Date    Hypertension     Mitral valve problem     leaking valve     Past Surgical History:   Procedure Laterality Date    HX OTHER SURGICAL  02/05/2020    Type A Aortic Dissection Repair       Expanded or extensive additional review of patient history:     Home Situation  Home Environment: Private residence  # Steps to Enter: 2  Rails to Enter: Yes  Hand Rails : Bilateral  One/Two Story Residence: Two story  # of Interior Steps: 12  Interior Rails: Left  Lift Chair Available: No  Living Alone: No  Support Systems: Spouse/Significant Other/Partner  Patient Expects to be Discharged to[de-identified] Private residence  Current DME Used/Available at Home: Grab bars  Tub or Shower Type: Shower(also has tub bath, enjoys taking baths)    Hand dominance: Right    EXAMINATION OF PERFORMANCE DEFICITS:  Cognitive/Behavioral Status:  Neurologic State: Drowsy; Alert  Orientation Level: Disoriented to situation;Oriented to person;Oriented to place;Oriented to time  Cognition: Follows commands  Perception: Appears intact  Perseveration: No perseveration noted  Safety/Judgement: Decreased awareness of need for safety;Decreased awareness of need for assistance    Skin: all lines/leads intact, Prevana dressing intact    Edema: none noted    Hearing:       Vision/Perceptual:                           Acuity: Within Defined Limits         Range of Motion:    AROM: Generally decreased, functional(shdr flx 90*, pt hesitant due to pain and lines)                         Strength:    Strength: Generally decreased, functional                Coordination:  Coordination: Generally decreased, functional  Fine Motor Skills-Upper: Right Impaired(R art and splint in place)    Gross Motor Skills-Upper: Left Intact; Right Intact    Tone & Sensation:       Sensation: Intact                      Balance:  Sitting: Impaired; Without support  Sitting - Static: Good (unsupported)  Sitting - Dynamic: Fair (occasional)  Standing: Impaired; Without support  Standing - Static: Fair  Standing - Dynamic : Fair    Functional Mobility and Transfers for ADLs:  Bed Mobility:  Sit to Supine: Minimum assistance;Assist x2; Additional time    Transfers:  Sit to Stand: Minimum assistance; Additional time  Stand to Sit: Minimum assistance; Additional time  Bed to Chair: Minimum assistance; Additional time    ADL Assessment:  Feeding: Independent    Oral Facial Hygiene/Grooming: Setup    Bathing: Moderate assistance(inferred, limited by lines/leads)    Upper Body Dressing: Moderate assistance(limited by lines/leads)    Lower Body Dressing: Moderate assistance    Toileting: Total assistance(owens)                ADL Intervention and task modifications:                                     Cognitive Retraining  Safety/Judgement: Decreased awareness of need for safety;Decreased awareness of need for assistance    Patient instructed and educated on mindful movement principles based on Move in The Tube concept to include maintaining bilateral elbows close to rib cage when performing any load-bearing activity such as getting in/out of bed, pushing up from a chair, opening a door, or lifting a box. Patient was given a handout with diagrams of each correct/incorrect method of performing each of the above tasks. Patient instructed and encouraged to mobilize bilateral upper extremities outside the tube when doing any non-load bearing activity such as washing hair/body, brushing teeth, retrieving clothing items, or scratching your back. Functional Measure:  Barthel Index:    Bathin  Bladder: 0  Bowels: 10  Groomin  Dressin  Feeding: 10  Mobility: 0  Stairs: 0  Toilet Use: 0  Transfer (Bed to Chair and Back): 10  Total: 30/100        The Barthel ADL Index: Guidelines  1. The index should be used as a record of what a patient does, not as a record of what a patient could do. 2. The main aim is to establish degree of independence from any help, physical or verbal, however minor and for whatever reason. 3. The need for supervision renders the patient not independent. 4. A patient's performance should be established using the best available evidence.  Asking the patient, friends/relatives and nurses are the usual sources, but direct observation and common sense are also important. However direct testing is not needed. 5. Usually the patient's performance over the preceding 24-48 hours is important, but occasionally longer periods will be relevant. 6. Middle categories imply that the patient supplies over 50 per cent of the effort. 7. Use of aids to be independent is allowed. Veronica Sofia., Barthel, D.W. (1498). Functional evaluation: the Barthel Index. [address] [facility] (14)2. Mirna Syed saul BAYRON WetzelF, Parrish Stone., Lauren Lauren., [place], [address] (1999). Measuring the change indisability after inpatient rehabilitation; comparison of the responsiveness of the Barthel Index and Functional [place] Measure. Journal of Neurology, Neurosurgery, and Psychiatry, 66(4), 291-739. Malu Sloan, NMeiJLUCITA, RADHA Roy, & Elyse Leon MMeiA. (2004.) Assessment of post-stroke quality of life in cost-effectiveness studies: The usefulness of the Barthel Index and the EuroQoL-5D. Quality of Life Research, 15, 164-62       Occupational Therapy Evaluation Charge Determination   History Examination Decision-Making   LOW Complexity : Brief history review  MEDIUM Complexity : 3-5 performance deficits relating to physical, cognitive , or psychosocial skils that result in activity limitations and / or participation restrictions LOW Complexity : No comorbidities that affect functional and no verbal or physical assistance needed to complete eval tasks       Based on the above components, the patient evaluation is determined to be of the following complexity level: LOW   Pain Rating:  C/o incisional pain    Activity Tolerance:   Good  Please refer to the flowsheet for vital signs taken during this treatment.     After treatment patient left in no apparent distress:    Supine in bed and Call bell within reach    COMMUNICATION/EDUCATION:   The patients plan of care was discussed with: Physical Therapist and Registered Nurse. Patient/family have participated as able in goal setting and plan of care. This patients plan of care is appropriate for delegation to FERMÍN.     Thank you for this referral.  Xena Paiz OT  Time Calculation: 23 mins

## 2020-02-06 NOTE — CARDIO/PULMONARY
Cardiac Rehab: Chart review for Jeremy Cummings based on consult. She does not qualify for the OP Cardiac Rehab program but will educate for post op AAA surgery discharge instructions. Oliva Lundborg, RN

## 2020-02-06 NOTE — ANESTHESIA POSTPROCEDURE EVALUATION
Post-Anesthesia Evaluation and Assessment    Patient: Giuseppe Saravia MRN: [id number]  SSN: [id number]    YOB: 1945  Age: 69 y.o. Sex: female      I have evaluated the patient and they are stable and ready for discharge from the PACU. Cardiovascular Function/Vital Signs  Visit Vitals  /63 (BP 1 Location: Right arm, BP Patient Position: At rest)   Pulse 88   Temp 37.2 °C (99 °F)   Resp 19   Ht 5' 4\" (1.626 m)   Wt 72.8 kg (160 lb 9.6 oz)   SpO2 95%   BMI 27.57 kg/m²       Patient is status post General anesthesia for Procedure(s):  REPAIR TYPE A  AORTIC DISSECTION, DEEP HYPOTHERMIC CIRCULATORY ARREST, RIGHT FEMORAL CUTDOWN, ECC, SLOAN AND EPIAORTIC U/S BY DR TRE LISA BY DR. Misa Barrett. .    Nausea/Vomiting: None    Postoperative hydration reviewed and adequate. Pain:  Pain Scale 1: Numeric (0 - 10) (02/06/20 1200)  Pain Intensity 1: 4 (02/06/20 1200)   Managed    Neurological Status:   Neuro  Neurologic State: Drowsy; Alert (02/06/20 1000)  Orientation Level: Disoriented to situation;Oriented to person;Oriented to place;Oriented to time (02/06/20 0800)  Cognition: Follows commands (02/06/20 1000)  Speech: Clear (02/06/20 0800)  Assessment L Pupil: Brisk;Round (02/06/20 0800)  Size L Pupil (mm): 3 (02/06/20 0800)  Assessment R Pupil: Brisk;Round (02/06/20 0800)  Size R Pupil (mm): 3 (02/06/20 0800)   At baseline    Mental Status, Level of Consciousness: Alert and  oriented to person, place, and time    Pulmonary Status:   O2 Device: Nasal cannula (02/06/20 1200)   Adequate oxygenation and airway patent    Complications related to anesthesia: None    Post-anesthesia assessment completed. No concerns    Signed By: James Valera MD     February 6, 2020              Procedure(s):  REPAIR TYPE A  AORTIC DISSECTION, DEEP HYPOTHERMIC CIRCULATORY ARREST, RIGHT FEMORAL CUTDOWN, ECC, SLOAN AND EPIAORTIC U/S BY DR TRE LISA BY DR. Misa Barrett. Marilynne Ruts     general    <BSHSIANPOST>    Vitals Value Taken Time   /61 2/6/2020  1:00 PM   Temp     Pulse 84 2/6/2020  1:09 PM   Resp 12 2/6/2020  1:09 PM   SpO2 95 % 2/6/2020  1:09 PM   Vitals shown include unvalidated device data.

## 2020-02-06 NOTE — PROGRESS NOTES
2000: Received report from Katarzyna Frey RN. Drips dual verified. Pt resting quietly in bed. A&Ox4.     0030: Pt awake in bed, pulling gown off and yelling \"Len. \" Pt reoriented to place and situation and pt states \"Oh now I remember I had surgery. 0700: Dr. Kimberlee Hooper on telephone, updated on pt status overnight. Orders received to keep SBP less than 140 and PRN 10 mg hydralazine Q6.      0800: Bedside and Verbal shift change report given to [employer] (oncoming nurse) by [address] [place] [address] (offgoing nurse). Report included the following information SBAR, ED Summary, OR Summary, Procedure Summary, Intake/Output, Med Rec Status and Cardiac Rhythm NSR to Sinus tach.

## 2020-02-06 NOTE — PROGRESS NOTES
CM attempted to meet with patient at bedside to conduct initial assessment- patient is post-op day 1 s/p Aortic dissection repair type A, PT/OT evaluations pending. Patient is sleeping soundly- CM will follow-up later today as able and appropriate.  Daniel Cuenca, MSW

## 2020-02-06 NOTE — CARDIO/PULMONARY
Cardiac Rehab: Cardiac surgery discharge instruction education is at bedside. Met with Deandre Chacon to begin cardiac surgery post discharge instructions. Deandre Chacon had a emergent AAA procedure. Educated using teach back method. Reviewed the use of bear for sternal support, daily weight and temperature monitoring and use of incentive spirometer. Deandre Chacon had great difficulty in the 
 use of incentive spirometer, achieving 200 ml. Re-instructed and discussed with her bedside nurse. [facility] surgery did not involve the aortic valve so she does not qualify for the Cardiac Rehab program 
 General questions answered. Deandre Chacon verbalized understanding. Will continue to follow for educational needs .  Enio Morales RN

## 2020-02-06 NOTE — PROGRESS NOTES
Cardiac Surgery Care Coordinator-  Met with Duc Schwartz. Reviewed plan of care and discussed goals for the day. Duc Schwartz has a fair understanding of her plan for the day. Reinforced sternal precautions with your move in the tube and encouraged continued use of the incentive spirometer. Will continue to follow for educational and emotional needs.  Shirley Bennett RN

## 2020-02-06 NOTE — PROGRESS NOTES
[facility] ICU Progress Note    Admit Date: 2020  POD:  1    Procedure:  Procedure(s):  REPAIR TYPE A  AORTIC DISSECTION/ RIGHT FEMORAL CUTDOWN. SLOAN BY DR. Gay Bolanos. Subjective:   Patient seen with Dr. Michelle Giang. Remains on insulin and Cardene gtt. Objective:   Vitals:  Blood pressure 106/51, pulse 92, temperature 99.3 °F (37.4 °C), resp. rate 19, height 5' 4\" (1.626 m), weight 160 lb 9.6 oz (72.8 kg), SpO2 94 %. Temp (24hrs), Av.9 °F (36.6 °C), Min:96.2 °F (35.7 °C), Max:99.6 °F (37.6 °C)    Hemodynamics:   CO: CO (l/min): 5.3 l/min   CI: CI (l/min/m2): 3.1 l/min/m2   CVP: CVP (mmHg): 6 mmHg (20)   SVR: SVR (dyne*sec)/cm5: 951 (dyne*sec)/cm5 (20 0278)   PAP Systolic: PAP Systolic: 24 (95/61/52 2954)   PAP Diastolic: PAP Diastolic: 9 (/ 4715)   PVR:     SV02: SVO2 (%): 62 % (20)   SCV02:      EKG/Rhythm:  SR in the 80s    Extubation Date / Time: 20 at 1455    CT Output:   980 ml + 140 ml (last 24 hours)    Oxygen Therapy:  Oxygen Therapy  O2 Sat (%): 94 % (20 08)  Pulse via Oximetry: 92 beats per minute (20 08)  O2 Device: Nasal cannula (20)  O2 Flow Rate (L/min): 4 l/min (20)  FIO2 (%): 50 % (20 1141)    CXR:   CXR Results  (Last 48 hours)               20 0451  XR CHEST PORT Final result    Impression:  IMPRESSION:    Small bilateral layering pleural effusions. Narrative:  PORTABLE CHEST RADIOGRAPH/S: 2020 4:51 AM       INDICATION: Postop heart. COMPARISON: 2020. TECHNIQUE: Portable frontal semiupright radiograph/s of the chest.       FINDINGS:    Small bilateral layering pleural effusions are associated with passive   atelectasis. The central airways are patent. The upper lobes are clear. No   pneumothorax. A pulmonary arterial catheter via right IJ sheath, mediastinal   drains, and epicardial pacing wires are in place.  Post CABG.            20 1017  XR CHEST PORT Final result Impression:  IMPRESSION:   1. Lines and tubes as above. Small left pleural effusion post median sternotomy           Narrative:  INDICATION:  postop heart        EXAM: Portable chest 0954 hours. No comparisons       FINDINGS: Endotracheal tube overlies the trachea at the superior margin of the   clavicles. Gastric tube extends into the upper abdomen. Its tip is not   visualized. There are mediastinal and left pleural drains. Cardiac silhouette is not enlarged. Patient is post median sternotomy. The   pulmonary vasculature is not engorged. No pneumothorax. Small left pleural   effusion                 Admission Weight: Last Weight   Weight: 149 lb 14.6 oz (68 kg) Weight: 160 lb 9.6 oz (72.8 kg)     Intake / Output / Drain:  Current Shift: 02/06 0701 - 02/06 1900  In: -   Out: 50 [Urine:50]  Last 24 hrs.:     Intake/Output Summary (Last 24 hours) at 2/6/2020 0806  Last data filed at 2/6/2020 0800  Gross per 24 hour   Intake 4314.73 ml   Output 3575 ml   Net 739.73 ml       EXAM:  General:  Intubated/sedated                                                                                             Lungs:   Clear to auscultation bilaterally. Incision:  Dsg C/D/I    Heart:  Regular rate and rhythm, S1, S2 normal, no murmur, click, rub or gallop. Abdomen:   Soft, non-tender. Bowel sounds hypoactive. No masses,  No organomegaly. Extremities:  No edema. Palpable pulses bilat    Neurologic:  sedated      Labs:   Recent Labs     02/06/20  0711  02/06/20  0406  02/05/20  1526   WBC  --   --  15.5*  --   --    HGB  --   --  7.4*  --  8.2*   HCT  --   --  22.6*  --  25.6*   PLT  --   --  90*  --   --    NA  --   --  147*  --  146*   K  --   --  3.8  --  4.0   BUN  --   --  18  --  14   CREA  --   --  0.81  --  0.92   GLU  --   --  87  --  138*   GLUCPOC 104*   < >  --    < >  --    INR  --   --   --   --  1.1    < > = values in this interval not displayed.         Assessment:     Principal Problem:    S/P aortic dissection repair (2020)      Overview: Type A Aortic Dissection Repair, 24mm Hemashield Graft      Right Femoral Cutdown for Peripheral Cannulation      DHCA, Antegrade Cerebral Perfusion    Active Problems: Aortic dissection (HCC) (2020)      Postoperative anemia due to acute blood loss (2020)         Plan/Recommendations/Medical Decision Makin. S/p emergent Type A dissection: BP control, SBP < 140. Currently on cardene gtt. Add BB today. PRN Hydralazine if needed. 2. Small L pleural effusion/atelectasis: IS and TCDB. Increase activity. Wean oxygen to keep sat >92%. 3. Anemia: expected, postop. Monitor H&H and CT output. CT output decreased significantly overnight. Continue to monitor. 4. HTN: Add BB today. PRN Hydralazine. Wean off Cardene as able. 5. HLD: Continue home pravastatin    6. Hx of MR: ? Listed in chart, assessed on intra op SLOAN and no MR seen per verbal report    Dispo: PT/OT/Cardiac Rehab. Case Management to assess for DC needs. Keep in CVICU today due to need for vasoactive drips and increased monitoring. Keep CT for now due to drainage.      Signed By: Alec Shepherd NP

## 2020-02-06 NOTE — DIABETES MGMT
ROJAS TAPIA  CLINICAL NURSE SPECIALIST   Followup Progress Note    Presentation   Jc Dong is a 69 y.o. female admitted with type A aortic dissectionand consulted by Provider for advanced specialty nursing care related to inpatient diabetes management. Hyperglycemia management orders in place. Subjective   Ms Marichuy Coley was extubated yesterday afternoon now on NC. Remains on cardene and insulin infusions. Chest tubes actively draining. Objective   Physical exam    General Alert, oriented and ill-appearing. Conversant and cooperative  Vital Signs   Visit Vitals  /63 (BP 1 Location: Right arm, BP Patient Position: At rest)   Pulse 88   Temp 99 °F (37.2 °C)   Resp 19   Ht 5' 4\" (1.626 m)   Wt 72.8 kg (160 lb 9.6 oz)   SpO2 95%   BMI 27.57 kg/m²     Skin  Warm and dry  Heart   Regular rate and rhythm.  No murmurs, rubs or gallops  Lungs  Clear to auscultation without rales or rhonchi  Extremities No foot wounds    Laboratory      CBC W/O DIFF    Collection Time: 02/06/20  4:06 AM   Result Value Ref Range    WBC 15.5 (H) 3.6 - 11.0 K/uL    RBC 2.40 (L) 3.80 - 5.20 M/uL    HGB 7.4 (L) 11.5 - 16.0 g/dL    HCT 22.6 (L) 35.0 - 47.0 %    MCV 94.2 80.0 - 99.0 FL    MCH 30.8 26.0 - 34.0 PG    MCHC 32.7 30.0 - 36.5 g/dL    RDW 13.5 11.5 - 14.5 %    PLATELET 90 (L) 811 - 400 K/uL    MPV 11.2 8.9 - 12.9 FL    NRBC 0.0 0  WBC    ABSOLUTE NRBC 0.00 0.00 - 0.01 K/uL     BMP:   Lab Results   Component Value Date/Time     (H) 02/06/2020 04:06 AM    K 3.8 02/06/2020 04:06 AM     (H) 02/06/2020 04:06 AM    CO2 25 02/06/2020 04:06 AM    AGAP 5 02/06/2020 04:06 AM    GLU 87 02/06/2020 04:06 AM    BUN 18 02/06/2020 04:06 AM    CREA 0.81 02/06/2020 04:06 AM    GFRAA >60 02/06/2020 04:06 AM    GFRNA >60 02/06/2020 04:06 AM          Blood glucose pattern      Assessment and Plan   Nursing Diagnosis Risk for unstable blood glucose pattern   Nursing Intervention Domain 1804 Decision-making Support   Nursing Interventions Examined current inpatient diabetes control   Explored factors facilitating and impeding inpatient management  Identified self-management practices impeding diabetes control  Explored corrective strategies with patient and responsible inpatient provider   Informed patient of rational for basal bolus insulin strategy while hospitalized     Evaluation   Basal insulin dosing has stabilized blood glucose levels in the past 24 hours while on insulin infusion. Infusion to be d/c 2/7 at 4am.    Recommendations   1. Continue insulin infusion per protocol. Started 2/5 at 0400     2. When transitioning off insulin infusion, POC Q6 hours  If blood glucose reaches over 200, initiate hyperglycemia management with:     Basal insulin   O         0.2  units/kg/D     Bolus insulin  O         Normal sensitivity: 6 units Humalog with meals     Corrective insulin  O         Normal sensitivity  Correctional Scale for Normal Sensitivity     200-249: 2 units Humalog  250-299: 4 units Humalog  300-349: 6 units Humalog  350-399: 8 units Humalog  Over 400: 10 units Humalog     Do NOT hold for NPO; give in addition to meal time insulin dose. If patient does not eat, give correction dose only.        3.  Received blood products- A1C not going to be accurate    Billing Code(s)   [address] [facility]  [phone]

## 2020-02-06 NOTE — PROGRESS NOTES
Reason for Admission:  Patient is post-op day 1 s/p type A Aortic Dissection                   RUR Score: 17% risk of re-admission                 Do you (patient/family) have any concerns for transition/discharge? Patient and family denied any concerns at this time                  Plan for utilizing home health:  TBD- CM will discuss with Cardiac Surgery, PT/OT recommending home health services. The patient denied having any current home health services in place      Current Advanced Directive/Advance Care Plan:  Full Code, not on file             Transition of Care Plan:  Anticipate home with possible home health services    The CM met with the patient and her daughter Jaylan Mcgee at bedside in order to introduce the role of CM and assess for patient needs. The patient lives in [facility] with her , daughter Jaylan Mcgee flew in from [facility]. The patient verified demographics and insurance information, endorses having prescription coverage- denied difficulty accessing medications, utilizes [address]. The patient is independent with ADLs and mobility at baseline- denied use of DME in the home. The patient endorses her  will transport home, PT/OT to continue to work with the patient. Anticipate possible home health needs, CM will speak further with Cardiac Surgery. CM will follow for transitions of care. KERVIN Ocampo    Care Management Interventions  PCP Verified by CM: Yes(Patient verified PCP as Dr. Rios Issa )  Mode of Transport at Discharge:  Other (see comment)(Family will transport home upon discharge)  Transition of Care Consult (CM Consult): Discharge Planning  MyChart Signup: No  Discharge Durable Medical Equipment: No  Health Maintenance Reviewed: Yes  Physical Therapy Consult: Yes  Occupational Therapy Consult: Yes  Speech Therapy Consult: No  Current Support Network: Own Home, Lives with Spouse  Confirm Follow Up Transport: Family  Discharge Location  Discharge Placement: Home with home health

## 2020-02-06 NOTE — PROGRESS NOTES
46 - Bedside report received from [facility]. Cardene just decreased to 5mg/hr  0755 - Pt c/o pain 5/10, PRN 5mg oxycodone given. 0805 - Cardene gtt decreased to 2.5mg/hr. 0840 - Cardene gtt stopped. 1030 - ART, CL, and owens removed. 1215 - Pt c/o pain 4/10, PRN 5mg Oxycodone given. 194 - Bedside shift change report given to [facility] (oncoming nurse) by Radha Henriquez RN (offgoing nurse). Report included the following information SBAR, Kardex, Procedure Summary, Intake/Output, MAR, Recent Results and Cardiac Rhythm NSR.

## 2020-02-06 NOTE — PROGRESS NOTES
Problem: Mobility Impaired (Adult and Pediatric)  Goal: *Acute Goals and Plan of Care (Insert Text)  Description  FUNCTIONAL STATUS PRIOR TO ADMISSION: Patient was independent and active without use of DME.     HOME SUPPORT PRIOR TO ADMISSION: The patient lived with her , but did not require assistance with mobility.  and patient split household tasks.  cooked. Both are retired. Physical Therapy Goals  Initiated 2/6/2020  1. Patient will move from supine to sit and sit to supine, scoot up and down and roll side to side in bed with modified independence within 5 days. 2.  Patient will perform sit to/from stand with independence within 5 days. 3.  Patient will ambulate 200 feet with supervision/set-up within 5 days. 4.  Patient will ascend/descend 10 stairs with single handrail(s) with supervision/set-up within 5 days. 5.  Patient will perform cardiac exercises per protocol with independence within 5 days. 6.  Patient will verbally recall and functionally demonstrate mindful-based movements (\"move in the tube\") principles without cues within 5 days. Outcome: Progressing Towards Goal    PHYSICAL THERAPY EVALUATION  Patient: Ke Juarez (55 y.o. female)  Date: 2/6/2020  Primary Diagnosis: Aortic dissection ([facility] [address] [I71.00]  Procedure(s) (LRB):  REPAIR TYPE A  AORTIC DISSECTION, DEEP HYPOTHERMIC CIRCULATORY ARREST, RIGHT FEMORAL CUTDOWN, ECC, SLOAN AND EPIAORTIC U/S BY DR TOLEDO SLOAN BY DR. Kay Silva. (N/A) 1 Day Post-Op   Precautions:  Sternal, Fall      ASSESSMENT  Based on the objective data described below, the patient presents with impaired functional independence compared to baseline secondary to emergent aortic dissection repair, now POD # 1.  Currently, patient's functional mobility is limited by the following: ICU lines/leads, newly enforced sternal precautions, typical post-op sternotomy pain (4/10), drowsiness, impaired cardiopulmonary tolerance (SvO2 dropping from 53% to 42% with activity), generally decreased functional strength, impaired endurance, and generally impaired dynamic standing balance. Overall, patient required minimal assistance x 1-2 for sit <> stand transfers, short-distance ambulation, and sit > supine transfer. In standing, patient performed x 10 standing marches and then completed ambulation x 5 ft (included side-stepping & retro-ambulation). Gait tiana very slow and cautious. Gait pattern shuffled, with increased trunk sway, and discontinuous steps. Reviewed mindful-based movement principles for load-bearing tasks (functional transfers, bed mobility, lifting objects > 5 lbs)- Patient verbalized understanding, however, required verbal + visual cuing in order to maintain UEs close to her ribcage to limit lateral pull on her sternum. Also, reviewed use of chest splinting while coughing/sneezing/vomiting, continued use of incentive spirometer 10 x per hour, role of acute PT and typical mobility progression, cardiac exercises and frequency of performance, signs/symptoms of DVTs/pneumonia/sternal wound dehiscence, and importance of frequent mobilization outside of therapy sessions with nursing staff. Current Level of Function Impacting Discharge (mobility/balance): Min A x 1-2    Functional Outcome Measure: The patient scored 30/100 on the Barthel Index outcome measure which is indicative of 70% impairment in ability to perform ADLs/functional tasks. Other factors to consider for discharge: Supportive , New sternotomy, Emergent procedure     Patient will benefit from skilled therapy intervention to address the above noted impairments.        PLAN :  Recommendations and Planned Interventions: bed mobility training, transfer training, gait training, therapeutic exercises, edema management/control, patient and family training/education, and therapeutic activities      Frequency/Duration: Patient will be followed by physical therapy:  daily to address goals. Recommendation for discharge: (in order for the patient to meet his/her long term goals)  Physical therapy at least 2 days/week in the home AND ensure assist and/or supervision for safety with community re-integration     This discharge recommendation:  Has been made in collaboration with the attending provider and/or case management    IF patient discharges home will need the following DME: to be determined (TBD), however, most likely none         SUBJECTIVE:   Patient stated I am just so tired.     OBJECTIVE DATA SUMMARY:   Patient mobilized on continuous portable monitor/telemetry.   HISTORY:    Past Medical History:   Diagnosis Date    Hypertension     Mitral valve problem     leaking valve     Past Surgical History:   Procedure Laterality Date    HX OTHER SURGICAL  02/05/2020    Type A Aortic Dissection Repair       Personal factors and/or comorbidities impacting plan of care: [facility]    Home Situation  Home Environment: Private residence  # Steps to Enter: 2  Rails to Enter: Yes  Hand Rails : Bilateral  One/Two Story Residence: Two story  # of Interior Steps: 12  Interior Rails: Left  Lift Chair Available: No  Living Alone: No  Support Systems: Spouse/Significant Other/Partner  Patient Expects to be Discharged to[de-identified] Private residence  Current DME Used/Available at Home: Grab bars  Tub or Shower Type: Shower(also has tub bath, enjoys taking baths)    EXAMINATION/PRESENTATION/DECISION MAKING:     Critical Behavior:  Neurologic State: Drowsy, Alert  Orientation Level: Disoriented to situation, Oriented to person, Oriented to place, Oriented to time  Cognition: Follows commands  Safety/Judgement: Decreased awareness of need for safety, Decreased awareness of need for assistance    Range Of Motion:  AROM: Generally decreased, functional(shdr flx 90*, pt hesitant due to pain and lines)                       Strength:    Strength: Generally decreased, functional                    Tone & Sensation: Sensation: Intact               Coordination:  Coordination: Within functional limits  Vision:   Acuity: Within Defined Limits  Functional Mobility:  Bed Mobility:    Sit to Supine: Minimum assistance;Assist x2; Additional time     Transfers:  Sit to Stand: Minimum assistance; Additional time  Stand to Sit: Minimum assistance; Additional time        Bed to Chair: Minimum assistance; Additional time    Balance:   Sitting: Impaired; Without support  Sitting - Static: Good (unsupported)  Sitting - Dynamic: Fair (occasional)  Standing: Impaired; Without support  Standing - Static: Fair  Standing - Dynamic : Fair      Cardiac diagnosis intervention:  Patient instructed and educated on mindful movement principles based on Move in The Tube concept to include maintaining bilateral elbows close to rib cage when performing any load-bearing activity such as getting in/out of bed, pushing up from a chair, opening a door, or lifting a box. Functional Measure:  Barthel Index:    Bathin  Bladder: 0  Bowels: 10  Groomin  Dressin  Feeding: 10  Mobility: 0  Stairs: 0  Toilet Use: 0  Transfer (Bed to Chair and Back): 10  Total: 30/100       The Barthel ADL Index: Guidelines  1. The index should be used as a record of what a patient does, not as a record of what a patient could do. 2. The main aim is to establish degree of independence from any help, physical or verbal, however minor and for whatever reason. 3. The need for supervision renders the patient not independent. 4. A patient's performance should be established using the best available evidence. Asking the patient, friends/relatives and nurses are the usual sources, but direct observation and common sense are also important. However direct testing is not needed. 5. Usually the patient's performance over the preceding 24-48 hours is important, but occasionally longer periods will be relevant.   6. Middle categories imply that the patient supplies over 50 per cent of the effort. 7. Use of aids to be independent is allowed. Nikki Moreno., Barthel, D.W. (1247). Functional evaluation: the Barthel Index. [address] (14)2. JOSEPHINE Sánchez, Anna Aguilar., Virgilio Baeza., [place], [address] (). Measuring the change indisability after inpatient rehabilitation; comparison of the responsiveness of the Barthel Index and Functional [place] Measure. Journal of Neurology, Neurosurgery, and Psychiatry, 66(4), 819-981. Roscoe Song, VALERIE, RADHA Roy, & Tanmay Hughes M.A. (2004.) Assessment of post-stroke quality of life in cost-effectiveness studies: The usefulness of the Barthel Index and the EuroQoL-5D. Quality of Life Research, 13, 325-76     Based on the above components, the patient evaluation is determined to be of the following complexity level: LOW     Pain Ratin/10 sternotomy pain    Activity Tolerance:   Fair, desaturates with exertion and requires oxygen, and requires rest breaks  Please refer to the flowsheet for vital signs taken during this treatment. After treatment patient left in no apparent distress:   Supine in bed, Call bell within reach, and Side rails x 3    COMMUNICATION/EDUCATION:   The patients plan of care was discussed with: Occupational Therapist, Registered Nurse, and . Fall prevention education was provided and the patient/caregiver indicated understanding., Patient/family have participated as able in goal setting and plan of care. , and Patient/family agree to work toward stated goals and plan of care.     Thank you for this referral.  Karen Mitchell, PT, DPT   Time Calculation: 23 mins

## 2020-02-07 ENCOUNTER — APPOINTMENT (OUTPATIENT)
Dept: GENERAL RADIOLOGY | Age: 75
DRG: 220 | End: 2020-02-07
Attending: PHYSICIAN ASSISTANT
Payer: MEDICARE

## 2020-02-07 LAB
ADMINISTERED INITIALS, ADMINIT: NORMAL
ALBUMIN SERPL-MCNC: 2.7 G/DL (ref 3.5–5)
ALBUMIN/GLOB SERPL: 1.4 {RATIO} (ref 1.1–2.2)
ALP SERPL-CCNC: 32 U/L (ref 45–117)
ALT SERPL-CCNC: 17 U/L (ref 12–78)
ANION GAP SERPL CALC-SCNC: 6 MMOL/L (ref 5–15)
AST SERPL-CCNC: 26 U/L (ref 15–37)
BILIRUB SERPL-MCNC: 0.6 MG/DL (ref 0.2–1)
BUN SERPL-MCNC: 17 MG/DL (ref 6–20)
BUN/CREAT SERPL: 28 (ref 12–20)
CALCIUM SERPL-MCNC: 7.2 MG/DL (ref 8.5–10.1)
CHLORIDE SERPL-SCNC: 113 MMOL/L (ref 97–108)
CO2 SERPL-SCNC: 23 MMOL/L (ref 21–32)
CREAT SERPL-MCNC: 0.6 MG/DL (ref 0.55–1.02)
D50 ADMINISTERED, D50ADM: 0 ML
D50 ORDER, D50ORD: 0 ML
ERYTHROCYTE [DISTWIDTH] IN BLOOD BY AUTOMATED COUNT: 14 % (ref 11.5–14.5)
GLOBULIN SER CALC-MCNC: 2 G/DL (ref 2–4)
GLSCOM COMMENTS: NORMAL
GLUCOSE BLD STRIP.AUTO-MCNC: 100 MG/DL (ref 65–100)
GLUCOSE BLD STRIP.AUTO-MCNC: 102 MG/DL (ref 65–100)
GLUCOSE BLD STRIP.AUTO-MCNC: 104 MG/DL (ref 65–100)
GLUCOSE BLD STRIP.AUTO-MCNC: 105 MG/DL (ref 65–100)
GLUCOSE BLD STRIP.AUTO-MCNC: 105 MG/DL (ref 65–100)
GLUCOSE BLD STRIP.AUTO-MCNC: 108 MG/DL (ref 65–100)
GLUCOSE BLD STRIP.AUTO-MCNC: 112 MG/DL (ref 65–100)
GLUCOSE BLD STRIP.AUTO-MCNC: 112 MG/DL (ref 65–100)
GLUCOSE BLD STRIP.AUTO-MCNC: 138 MG/DL (ref 65–100)
GLUCOSE BLD STRIP.AUTO-MCNC: 140 MG/DL (ref 65–100)
GLUCOSE BLD STRIP.AUTO-MCNC: 141 MG/DL (ref 65–100)
GLUCOSE BLD STRIP.AUTO-MCNC: 166 MG/DL (ref 65–100)
GLUCOSE BLD STRIP.AUTO-MCNC: 173 MG/DL (ref 65–100)
GLUCOSE BLD STRIP.AUTO-MCNC: 91 MG/DL (ref 65–100)
GLUCOSE BLD STRIP.AUTO-MCNC: 96 MG/DL (ref 65–100)
GLUCOSE BLD STRIP.AUTO-MCNC: 99 MG/DL (ref 65–100)
GLUCOSE BLD STRIP.AUTO-MCNC: 99 MG/DL (ref 65–100)
GLUCOSE SERPL-MCNC: 82 MG/DL (ref 65–100)
GLUCOSE, GLC: 100 MG/DL
GLUCOSE, GLC: 102 MG/DL
GLUCOSE, GLC: 104 MG/DL
GLUCOSE, GLC: 105 MG/DL
GLUCOSE, GLC: 105 MG/DL
GLUCOSE, GLC: 108 MG/DL
GLUCOSE, GLC: 112 MG/DL
GLUCOSE, GLC: 112 MG/DL
GLUCOSE, GLC: 138 MG/DL
GLUCOSE, GLC: 141 MG/DL
GLUCOSE, GLC: 91 MG/DL
GLUCOSE, GLC: 99 MG/DL
GLUCOSE, GLC: 99 MG/DL
HCT VFR BLD AUTO: 18.5 % (ref 35–47)
HCT VFR BLD AUTO: 22.4 % (ref 35–47)
HGB BLD-MCNC: 5.8 G/DL (ref 11.5–16)
HGB BLD-MCNC: 7.1 G/DL (ref 11.5–16)
HIGH TARGET, HITG: 130 MG/DL
INSULIN ADMINSTERED, INSADM: 0.4 UNITS/HOUR
INSULIN ADMINSTERED, INSADM: 0.6 UNITS/HOUR
INSULIN ADMINSTERED, INSADM: 0.6 UNITS/HOUR
INSULIN ADMINSTERED, INSADM: 0.7 UNITS/HOUR
INSULIN ADMINSTERED, INSADM: 0.7 UNITS/HOUR
INSULIN ADMINSTERED, INSADM: 0.9 UNITS/HOUR
INSULIN ADMINSTERED, INSADM: 0.9 UNITS/HOUR
INSULIN ADMINSTERED, INSADM: 1.1 UNITS/HOUR
INSULIN ADMINSTERED, INSADM: 1.4 UNITS/HOUR
INSULIN ADMINSTERED, INSADM: 1.5 UNITS/HOUR
INSULIN ADMINSTERED, INSADM: 1.6 UNITS/HOUR
INSULIN ADMINSTERED, INSADM: 1.9 UNITS/HOUR
INSULIN ADMINSTERED, INSADM: 2.7 UNITS/HOUR
INSULIN ORDER, INSORD: 0.4 UNITS/HOUR
INSULIN ORDER, INSORD: 0.6 UNITS/HOUR
INSULIN ORDER, INSORD: 0.6 UNITS/HOUR
INSULIN ORDER, INSORD: 0.7 UNITS/HOUR
INSULIN ORDER, INSORD: 0.7 UNITS/HOUR
INSULIN ORDER, INSORD: 0.9 UNITS/HOUR
INSULIN ORDER, INSORD: 0.9 UNITS/HOUR
INSULIN ORDER, INSORD: 1.1 UNITS/HOUR
INSULIN ORDER, INSORD: 1.4 UNITS/HOUR
INSULIN ORDER, INSORD: 1.5 UNITS/HOUR
INSULIN ORDER, INSORD: 1.6 UNITS/HOUR
INSULIN ORDER, INSORD: 1.9 UNITS/HOUR
INSULIN ORDER, INSORD: 2.7 UNITS/HOUR
LOW TARGET, LOT: 95 MG/DL
MAGNESIUM SERPL-MCNC: 2 MG/DL (ref 1.6–2.4)
MCH RBC QN AUTO: 30.5 PG (ref 26–34)
MCHC RBC AUTO-ENTMCNC: 31.4 G/DL (ref 30–36.5)
MCV RBC AUTO: 97.4 FL (ref 80–99)
MINUTES UNTIL NEXT BG, NBG: 60 MIN
MULTIPLIER, MUL: 0.01
MULTIPLIER, MUL: 0.02
MULTIPLIER, MUL: 0.03
NRBC # BLD: 0 K/UL (ref 0–0.01)
NRBC BLD-RTO: 0 PER 100 WBC
ORDER INITIALS, ORDINIT: NORMAL
PLATELET # BLD AUTO: 76 K/UL (ref 150–400)
PMV BLD AUTO: 11.3 FL (ref 8.9–12.9)
POTASSIUM SERPL-SCNC: 3.8 MMOL/L (ref 3.5–5.1)
PROT SERPL-MCNC: 4.7 G/DL (ref 6.4–8.2)
RBC # BLD AUTO: 1.9 M/UL (ref 3.8–5.2)
SERVICE CMNT-IMP: ABNORMAL
SERVICE CMNT-IMP: NORMAL
SODIUM SERPL-SCNC: 142 MMOL/L (ref 136–145)
WBC # BLD AUTO: 17.1 K/UL (ref 3.6–11)

## 2020-02-07 PROCEDURE — 74011250637 HC RX REV CODE- 250/637: Performed by: NURSE PRACTITIONER

## 2020-02-07 PROCEDURE — 80053 COMPREHEN METABOLIC PANEL: CPT

## 2020-02-07 PROCEDURE — 74011250637 HC RX REV CODE- 250/637: Performed by: PHYSICIAN ASSISTANT

## 2020-02-07 PROCEDURE — 85027 COMPLETE CBC AUTOMATED: CPT

## 2020-02-07 PROCEDURE — 74011000258 HC RX REV CODE- 258: Performed by: PHYSICIAN ASSISTANT

## 2020-02-07 PROCEDURE — 97110 THERAPEUTIC EXERCISES: CPT

## 2020-02-07 PROCEDURE — 74011250636 HC RX REV CODE- 250/636: Performed by: PHYSICIAN ASSISTANT

## 2020-02-07 PROCEDURE — 82962 GLUCOSE BLOOD TEST: CPT

## 2020-02-07 PROCEDURE — 65660000001 HC RM ICU INTERMED STEPDOWN

## 2020-02-07 PROCEDURE — 71045 X-RAY EXAM CHEST 1 VIEW: CPT

## 2020-02-07 PROCEDURE — 85018 HEMOGLOBIN: CPT

## 2020-02-07 PROCEDURE — 74011250636 HC RX REV CODE- 250/636: Performed by: NURSE PRACTITIONER

## 2020-02-07 PROCEDURE — 74011636637 HC RX REV CODE- 636/637: Performed by: PHYSICIAN ASSISTANT

## 2020-02-07 PROCEDURE — 97116 GAIT TRAINING THERAPY: CPT

## 2020-02-07 PROCEDURE — 97535 SELF CARE MNGMENT TRAINING: CPT

## 2020-02-07 PROCEDURE — 83735 ASSAY OF MAGNESIUM: CPT

## 2020-02-07 PROCEDURE — 36415 COLL VENOUS BLD VENIPUNCTURE: CPT

## 2020-02-07 PROCEDURE — 74011636637 HC RX REV CODE- 636/637: Performed by: NURSE PRACTITIONER

## 2020-02-07 RX ORDER — SODIUM CHLORIDE 0.9 % (FLUSH) 0.9 %
5-40 SYRINGE (ML) INJECTION EVERY 8 HOURS
Status: DISCONTINUED | OUTPATIENT
Start: 2020-02-07 | End: 2020-02-12 | Stop reason: HOSPADM

## 2020-02-07 RX ORDER — METOPROLOL TARTRATE 50 MG/1
50 TABLET ORAL EVERY 12 HOURS
Status: DISCONTINUED | OUTPATIENT
Start: 2020-02-07 | End: 2020-02-10

## 2020-02-07 RX ORDER — ACETAMINOPHEN 325 MG/1
650 TABLET ORAL
Status: DISCONTINUED | OUTPATIENT
Start: 2020-02-07 | End: 2020-02-12 | Stop reason: HOSPADM

## 2020-02-07 RX ORDER — POTASSIUM CHLORIDE 750 MG/1
20 TABLET, FILM COATED, EXTENDED RELEASE ORAL
Status: COMPLETED | OUTPATIENT
Start: 2020-02-07 | End: 2020-02-07

## 2020-02-07 RX ORDER — LANOLIN ALCOHOL/MO/W.PET/CERES
1 CREAM (GRAM) TOPICAL
Status: DISCONTINUED | OUTPATIENT
Start: 2020-02-07 | End: 2020-02-12 | Stop reason: HOSPADM

## 2020-02-07 RX ORDER — SODIUM CHLORIDE 0.9 % (FLUSH) 0.9 %
5-40 SYRINGE (ML) INJECTION AS NEEDED
Status: DISCONTINUED | OUTPATIENT
Start: 2020-02-07 | End: 2020-02-12 | Stop reason: HOSPADM

## 2020-02-07 RX ORDER — FUROSEMIDE 10 MG/ML
20 INJECTION INTRAMUSCULAR; INTRAVENOUS ONCE
Status: COMPLETED | OUTPATIENT
Start: 2020-02-07 | End: 2020-02-07

## 2020-02-07 RX ADMIN — FUROSEMIDE 20 MG: 10 INJECTION, SOLUTION INTRAMUSCULAR; INTRAVENOUS at 11:24

## 2020-02-07 RX ADMIN — ASPIRIN 81 MG 81 MG: 81 TABLET ORAL at 09:00

## 2020-02-07 RX ADMIN — MAGNESIUM OXIDE TAB 400 MG (241.3 MG ELEMENTAL MG) 400 MG: 400 (241.3 MG) TAB at 09:00

## 2020-02-07 RX ADMIN — METOPROLOL TARTRATE 50 MG: 50 TABLET, FILM COATED ORAL at 09:02

## 2020-02-07 RX ADMIN — MAGNESIUM OXIDE TAB 400 MG (241.3 MG ELEMENTAL MG) 400 MG: 400 (241.3 MG) TAB at 18:21

## 2020-02-07 RX ADMIN — METOPROLOL TARTRATE 50 MG: 50 TABLET, FILM COATED ORAL at 21:02

## 2020-02-07 RX ADMIN — POLYETHYLENE GLYCOL 3350 17 G: 17 POWDER, FOR SOLUTION ORAL at 09:00

## 2020-02-07 RX ADMIN — SENNOSIDES AND DOCUSATE SODIUM 1 TABLET: 8.6; 5 TABLET ORAL at 18:21

## 2020-02-07 RX ADMIN — SENNOSIDES AND DOCUSATE SODIUM 1 TABLET: 8.6; 5 TABLET ORAL at 09:00

## 2020-02-07 RX ADMIN — INSULIN LISPRO 2 UNITS: 100 INJECTION, SOLUTION INTRAVENOUS; SUBCUTANEOUS at 18:21

## 2020-02-07 RX ADMIN — ACETAMINOPHEN 325 MG: 325 TABLET ORAL at 09:27

## 2020-02-07 RX ADMIN — CHLORHEXIDINE GLUCONATE 10 ML: 1.2 RINSE ORAL at 09:07

## 2020-02-07 RX ADMIN — MUPIROCIN: 20 OINTMENT TOPICAL at 09:07

## 2020-02-07 RX ADMIN — OXYCODONE 5 MG: 5 TABLET ORAL at 05:38

## 2020-02-07 RX ADMIN — Medication 10 ML: at 21:02

## 2020-02-07 RX ADMIN — ACETAMINOPHEN 1000 MG: 10 INJECTION, SOLUTION INTRAVENOUS at 05:06

## 2020-02-07 RX ADMIN — HYDRALAZINE HYDROCHLORIDE 10 MG: 20 INJECTION INTRAMUSCULAR; INTRAVENOUS at 21:07

## 2020-02-07 RX ADMIN — PRAVASTATIN SODIUM 20 MG: 20 TABLET ORAL at 09:00

## 2020-02-07 RX ADMIN — AMIODARONE HYDROCHLORIDE 400 MG: 200 TABLET ORAL at 09:00

## 2020-02-07 RX ADMIN — MUPIROCIN: 20 OINTMENT TOPICAL at 18:21

## 2020-02-07 RX ADMIN — CHLORHEXIDINE GLUCONATE 10 ML: 1.2 RINSE ORAL at 21:02

## 2020-02-07 RX ADMIN — FERROUS SULFATE TAB 325 MG (65 MG ELEMENTAL FE) 325 MG: 325 (65 FE) TAB at 09:03

## 2020-02-07 RX ADMIN — SODIUM CHLORIDE 0.4 UNITS/HR: 9 INJECTION, SOLUTION INTRAVENOUS at 04:01

## 2020-02-07 RX ADMIN — POTASSIUM CHLORIDE 20 MEQ: 750 TABLET, FILM COATED, EXTENDED RELEASE ORAL at 11:24

## 2020-02-07 RX ADMIN — AMIODARONE HYDROCHLORIDE 400 MG: 200 TABLET ORAL at 21:02

## 2020-02-07 RX ADMIN — PANTOPRAZOLE SODIUM 40 MG: 40 TABLET, DELAYED RELEASE ORAL at 07:25

## 2020-02-07 NOTE — PROGRESS NOTES
Occupational Therapy  Pt seen by OT this date, performed standing ADLs at sink with CGA, cardiac ex. Full note to follow.  [facility] and family support/assist. Cindy Pop, OT

## 2020-02-07 NOTE — PROGRESS NOTES
CSS Progress Note    Admit Date: 2020  POD:  2    Procedure:  Procedure(s):  REPAIR TYPE A  AORTIC DISSECTION/ RIGHT FEMORAL CUTDOWN. SLOAN BY DR. Martin Noe. Subjective:   Patient seen with Dr. Laura Becker. Pt up in chair, not very interactive. Remains on insulin gtt only. T max 99.6F, NC 2L     Objective:   Vitals:  Blood pressure 132/68, pulse 87, temperature 98.5 °F (36.9 °C), resp. rate 15, height 5' 4\" (1.626 m), weight 160 lb (72.6 kg), SpO2 94 %. Temp (24hrs), Av.5 °F (36.9 °C), Min:97.6 °F (36.4 °C), Max:99 °F (37.2 °C)    EKG/Rhythm:  SR in the 80s    CT Output: +230 ml    Oxygen Therapy:  Oxygen Therapy  O2 Sat (%): 94 % (20 0800)  Pulse via Oximetry: 82 beats per minute (20 0900)  O2 Device: Nasal cannula (20 0800)  O2 Flow Rate (L/min): 2 l/min (20 0800)  FIO2 (%): 50 % (20 1141)    CXR:   CXR Results  (Last 48 hours)               20 0504  XR CHEST PORT Final result    Impression:  IMPRESSION:   1. Persistent moderate bilateral pleural effusions left greater than right with   bibasilar atelectasis                               Narrative:  EXAM: XR CHEST PORT       INDICATION: postop heart surgery       COMPARISON: 2020       FINDINGS: A portable AP radiograph of the chest was obtained at 0411 hours. The   patient is on a cardiac monitor. Mediastinal drains remain in place. There is no   change in the enlarged cardiac silhouette. Moderate bilateral pleural effusions   left greater than right with bibasilar atelectasis are noted and unchanged. No   pulmonary edema no pneumothorax. Pj Chandra 20 0451  XR CHEST PORT Final result    Impression:  IMPRESSION:    Small bilateral layering pleural effusions. Narrative:  PORTABLE CHEST RADIOGRAPH/S: 2020 4:51 AM       INDICATION: Postop heart. COMPARISON: 2020.        TECHNIQUE: Portable frontal semiupright radiograph/s of the chest.       FINDINGS:    Small bilateral layering pleural effusions are associated with passive   atelectasis. The central airways are patent. The upper lobes are clear. No   pneumothorax. A pulmonary arterial catheter via right IJ sheath, mediastinal   drains, and epicardial pacing wires are in place. Post CABG.            02/05/20 1017  XR CHEST PORT Final result    Impression:  IMPRESSION:   1. Lines and tubes as above. Small left pleural effusion post median sternotomy           Narrative:  INDICATION:  postop heart        EXAM: Portable chest 0954 hours. No comparisons       FINDINGS: Endotracheal tube overlies the trachea at the superior margin of the   clavicles. Gastric tube extends into the upper abdomen. Its tip is not   visualized. There are mediastinal and left pleural drains. Cardiac silhouette is not enlarged. Patient is post median sternotomy. The   pulmonary vasculature is not engorged. No pneumothorax. Small left pleural   effusion                 Admission Weight: Last Weight   Weight: 149 lb 14.6 oz (68 kg) Weight: 160 lb (72.6 kg)     Intake / Output / Drain:  Current Shift: 02/07 0701 - 02/07 1900  In: 122.2 [I.V.:122.2]  Out: 0   Last 24 hrs.:     Intake/Output Summary (Last 24 hours) at 2/7/2020 0853  Last data filed at 2/7/2020 0800  Gross per 24 hour   Intake 2273.38 ml   Output 1150 ml   Net 1123.38 ml       EXAM:  General:  Flat affect, no distress                                                                                            Lungs:   Diminished bases bilat. Incision:  Dsg C/D/I - Prevena intact   Heart:  Regular rate and rhythm, S1, S2 normal, no murmur, click, rub or gallop. Abdomen:   Soft, non-tender. Bowel sounds hypoactive. No masses,  No organomegaly. Extremities:  No edema.  Palpable pulses bilat    Neurologic:  Gross motor and sensory apparatus intact      Labs:   Recent Labs     02/07/20  0812  02/07/20  0443 02/07/20  0400  02/05/20  1526   WBC  --   --   --  17.1*   < >  --    HGB  --   --  7.1* 5.8*   < > 8.2* HCT  --   --  22.4* 18.5*   < > 25.6*   PLT  --   --   --  76*   < >  --    NA  --   --   --  142   < > 146*   K  --   --   --  3.8   < > 4.0   BUN  --   --   --  17   < > 14   CREA  --   --   --  0.60   < > 0.92   GLU  --   --   --  82   < > 138*   GLUCPOC 112*   < >  --  91   < >  --    INR  --   --   --   --   --  1.1    < > = values in this interval not displayed. Assessment:     Principal Problem:    S/P aortic dissection repair (2020)      Overview: Type A Aortic Dissection Repair, 24mm Hemashield Graft      Right Femoral Cutdown for Peripheral Cannulation      DHCA, Antegrade Cerebral Perfusion    Active Problems: Aortic dissection (HCC) (2020)      Postoperative anemia due to acute blood loss (2020)       Plan/Recommendations/Medical Decision Makin. S/p emergent Type A dissection: BP control, SBP < 140. Increase BB. PRN Hydralazine if needed. 2. Small L pleural effusion/atelectasis: IS and TCDB. Increase activity. Wean oxygen to keep sat >92%. Diurese today    3. Anemia: expected, postop. Monitor H&H and CT output. Repeat H&H 7.1, avoid transfusion for now. Start iron as diet advanced    4. HTN: increase BB    5. HLD: Continue home pravastatin    6. Hx of MR: ? Listed in chart, assessed on intra op SLOAN and no MR seen per verbal report    7. Thrombocytopenia: plts 76K, cont baby ASA for now. On protonix. Monitor    8. DVT/GI: SCD's, protonix    9. Leukocytosis: WBC up to 17.1K, T max 99.6. Increase pulm toileting. Culture if temp > 101F     Dispo: PT/OT/Cardiac Rehab. Case Management to assess for DC needs.  Transfer to [facility], may keep CT's 1 more day due to increased amount 20      Signed By: Mitzie Boeck, NP

## 2020-02-07 NOTE — PROGRESS NOTES
Problem: Mobility Impaired (Adult and Pediatric)  Goal: *Acute Goals and Plan of Care (Insert Text)  Description  FUNCTIONAL STATUS PRIOR TO ADMISSION: Patient was independent and active without use of DME.     HOME SUPPORT PRIOR TO ADMISSION: The patient lived with her , but did not require assistance with mobility.  and patient split household tasks.  cooked. Both are retired. Physical Therapy Goals  Initiated 2/6/2020  1. Patient will move from supine to sit and sit to supine, scoot up and down and roll side to side in bed with modified independence within 5 days. 2.  Patient will perform sit to/from stand with independence within 5 days. 3.  Patient will ambulate 200 feet with supervision/set-up within 5 days. 4.  Patient will ascend/descend 10 stairs with single handrail(s) with supervision/set-up within 5 days. 5.  Patient will perform cardiac exercises per protocol with independence within 5 days. 6.  Patient will verbally recall and functionally demonstrate mindful-based movements (\"move in the tube\") principles without cues within 5 days. Outcome: Progressing Towards Goal  PHYSICAL THERAPY TREATMENT  Patient: Ke Juarez (16 y.o. female)  Date: 2/7/2020  Diagnosis: Aortic dissection ([address] [I71.00]   S/P aortic dissection repair  Procedure(s) (LRB):  REPAIR TYPE A  AORTIC DISSECTION, DEEP HYPOTHERMIC CIRCULATORY ARREST, RIGHT FEMORAL CUTDOWN, ECC, SLOAN AND EPIAORTIC U/S BY DR TOLEDO SLOAN BY DR. Kay Silva. (N/A) 2 Days Post-Op  Precautions: Fall, Sternal(SBP <140)  Chart, physical therapy assessment, plan of care and goals were reviewed. ASSESSMENT  Patient continues with skilled PT services and is progressing towards goals. Patient received supine and asleep, agreeable to therapy with encouragement. Ambulated for first time since surgery with slow, cautious, step to pattern. Able to progress with cues for increased step length and tiana and maintain balance with HHA x1. Fearful of falling and with one mild LOB episode. Required demonstration for cardiac exercises below and cues for pursed lip breathing. VSS with mobility while on 2L NC. Patient is not verbalizing and is not demonstrating understanding of mindful-based movements (\"move in the tube\") principles of keeping UEs proximal to ribcage to prevent lateral pull on the sternum during load-bearing activities with visual, verbal, and manual cues required for compliance. **Will benefit from ongoing review. **    Current Level of Function Impacting Discharge (mobility/balance): min Ax2 bed mobility and max cues for \"move in the tube\" adherence    Other factors to consider for discharge: new sternotomy, emergency surgery, good family support         PLAN :  Patient continues to benefit from skilled intervention to address the above impairments. Continue treatment per established plan of care. to address goals. Recommendation for discharge: (in order for the patient to meet his/her long term goals)  Physical therapy at least 2 days/week in the home AND ensure assist and/or supervision for safety with mobility and mindful movement techniques     This discharge recommendation:  Has been made in collaboration with the attending provider and/or case management    IF patient discharges home will need the following DME: none       SUBJECTIVE:   Patient stated My best friend and her  are physical therapists in [facility].     OBJECTIVE DATA SUMMARY:   Patient mobilized on continuous portable monitor/telemetry.   Critical Behavior:  Neurologic State: Alert  Orientation Level: Oriented X4  Cognition: Follows commands  Safety/Judgement: Decreased awareness of need for safety, Decreased awareness of need for assistance  Functional Mobility Training:  Bed Mobility:  Supine to Sit: Minimum assistance;Assist x2  Sit to Supine: Minimum assistance;Assist x2  Transfers:  Sit to Stand: Contact guard assistance  Stand to Sit: Contact guard assistance  Balance:  Sitting: Intact  Standing: Impaired; Without support  Standing - Static: Fair  Standing - Dynamic : Fair  Ambulation/Gait Training:  Distance (ft): 60 Feet (ft)  Assistive Device: Gait belt(HHA)  Ambulation - Level of Assistance: Moderate assistance  Gait Abnormalities: Decreased step clearance;Shuffling gait  Base of Support: Widened  Speed/Chayito: Pace decreased (<100 feet/min)  Step Length: Right shortened;Left shortened    Cardiac diagnosis intervention:  Patient instructed and educated on mindful movement principles based on Move in The Tube concept to include maintaining bilateral elbows close to rib cage when performing any load-bearing activity such as getting in/out of bed, pushing up from a chair, opening a door, or lifting a box. Patient was given a handout with diagrams of each correct/incorrect method of performing each of the above tasks. Therapeutic Exercises:   Patient instructed on the benefits and demonstrated cardiac exercises while seated with Supervision. Instructed and indicated understanding on how to progress reps, sets against gravity, pacing through progressive muscle strengthening standing based on surgeon clearance for more weight in prep for basic and instrumental ADLs. Instruction on the use of household items in place of weights as needed.      CARDIAC  EXERCISE   Sets   Reps   Active Active Assist   Passive Self ROM   Comments   Shoulder flexion 1 5 [x]                                            []                                            []                                            []                                               Shoulder abduction 1      5 [x]                                            []                                            []                                            []                                               Scapular elevation 1 5 [x]                                            [] []                                            []                                                   Pain Rating:  Denied pain    Activity Tolerance:   Fair and requires rest breaks  Please refer to the flowsheet for vital signs taken during this treatment.     After treatment patient left in no apparent distress:   Supine in bed, Heels elevated for pressure relief, and Call bell within reach    COMMUNICATION/COLLABORATION:   The patients plan of care was discussed with: Occupational Therapist and Registered Nurse    Evelyne Mitchell, PT, DPT   Time Calculation: 27 mins

## 2020-02-07 NOTE — PROGRESS NOTES
0800- bedside report and drips verified. Patient resting in chair. 1330- patient ate 50% of cardiac diet, per protocol will transition off insulin drip. Informed patient and family of this. They stated patient does not need insulin (her brother is an endrinologist). Provided education regarding the cardiac surgery protocol and spoke to them in detail regarding this practice. Patient is still adamant she does not need insulin. She would have received 4 units of Lantus per protocol for last 6 hours of insulin received. 1845- TRANSFER - OUT REPORT:    Verbal report given to [place], RN(name) on Oswald Rose  being transferred to [facility](unit) for routine progression of care       Report consisted of patients Situation, Background, Assessment and   Recommendations(SBAR). Information from the following report(s) SBAR, Kardex, Recent Results and Cardiac Rhythm NSR was reviewed with the receiving nurse. Lines:   Peripheral IV 02/05/20 Right Antecubital (Active)   Phlebitis Assessment 0 2/7/2020  8:00 AM   Infiltration Assessment 0 2/7/2020  8:00 AM   Dressing Status Clean, dry, & intact 2/7/2020  8:00 AM   Dressing Type Transparent 2/7/2020  8:00 AM   Hub Color/Line Status Pink;Capped;Flushed 2/7/2020  8:00 AM   Action Taken Open ports on tubing capped 2/7/2020  8:00 AM   Alcohol Cap Used Yes 2/7/2020  8:00 AM       Peripheral IV 02/06/20 Left Antecubital (Active)   Phlebitis Assessment 0 2/7/2020  8:00 AM   Infiltration Assessment 0 2/7/2020  8:00 AM   Dressing Status Clean, dry, & intact 2/7/2020  8:00 AM   Dressing Type Transparent 2/7/2020  8:00 AM   Hub Color/Line Status Blue; Infusing 2/7/2020  8:00 AM   Action Taken Open ports on tubing capped 2/7/2020  8:00 AM   Alcohol Cap Used Yes 2/7/2020  8:00 AM        Opportunity for questions and clarification was provided.       Patient transported with:   Monitor  O2 @ 2 liters  Registered Nurse

## 2020-02-07 NOTE — PROGRESS NOTES
2000: Received report from [facility]. Drip dual verified. 0200: Pt OOB x1 assist, voided 500 ml urine, assisted back to bed without difficulty. 0400: Hgb resulted 5.8, lab redrew from different location. No s/s of bleeding, VSS. Will continue to monitor until 2nd H&H is resulted. 0530: Hgb redraw results: 7.1, will disregard previous results. 0700: Pt OOB to chair x1 assist without difficulty. 0800: Bedside and Verbal shift change report given to Ppai Meyers RN (oncoming nurse) by Jing Olguin (offgoing nurse). Report included the following information SBAR, Intake/Output, MAR and Cardiac Rhythm NSR 1st AVB.

## 2020-02-07 NOTE — PROGRESS NOTES
Transitions of Care:     -Anticipate discharge home with home health services- may need HH PT/OT orders added   -PT/OT evaluating for needs   -Cardiac Surgery follow-up    The CM spoke with Cardiac Surgery- will order home health for the patient. CM met with patient at bedside and provided education on home health services, patient agreeable- CM provided home health list at bedside for patient to review. The CM discussed possible weekend discharge, would like to have services in place by the end-of-the-day for possible Sunday d/c- patient endorses that if CM comes back later this afternoon that will give her enough time to review. CM will follow-up with patient later this afternoon for [place] [facility]. Mela Koo, MSW    13:55 p.m.- CM met with patient and daughter Jose Luis Basilio at bedside- patient deferred to [address] provided [place] [facility] for 1) [facility] and second choice is [facility]. CM called [facility]- spoke with Marques Salgado in intake, provided her overview- aware of anticipated discharge on Sunday. CM faxed orders directly to [facility] with additional clinical information and Facesheet for review ([phone], f: [phone]). CM will await their review. Jose Luis Basilio confirmed that she will be staying in [facility] for the upcoming weeks, patient will have additional family support at home from  and daughter Jose Luis Basilio. Mela Koo, MSW    14:54 p.m.- CM spoke with Marques Salgado in admissions at [facility]- they can accept the patient for home health services. Marques Salgado is requesting that MD initial or sign orders- CM will notify NP, will place orders on chart for MD- if MD is in [address], CM asked [facility] to send information to Cardiac Surgery Office- Marques Salgado with [facility] endorses that this will suffice, aware of anticipated Sunday dishcarge.

## 2020-02-08 ENCOUNTER — APPOINTMENT (OUTPATIENT)
Dept: GENERAL RADIOLOGY | Age: 75
DRG: 220 | End: 2020-02-08
Attending: NURSE PRACTITIONER
Payer: MEDICARE

## 2020-02-08 LAB
ANION GAP SERPL CALC-SCNC: 5 MMOL/L (ref 5–15)
BUN SERPL-MCNC: 17 MG/DL (ref 6–20)
BUN/CREAT SERPL: 22 (ref 12–20)
CALCIUM SERPL-MCNC: 8.6 MG/DL (ref 8.5–10.1)
CHLORIDE SERPL-SCNC: 102 MMOL/L (ref 97–108)
CO2 SERPL-SCNC: 26 MMOL/L (ref 21–32)
CREAT SERPL-MCNC: 0.79 MG/DL (ref 0.55–1.02)
ERYTHROCYTE [DISTWIDTH] IN BLOOD BY AUTOMATED COUNT: 13.7 % (ref 11.5–14.5)
GLUCOSE BLD STRIP.AUTO-MCNC: 120 MG/DL (ref 65–100)
GLUCOSE BLD STRIP.AUTO-MCNC: 141 MG/DL (ref 65–100)
GLUCOSE BLD STRIP.AUTO-MCNC: 151 MG/DL (ref 65–100)
GLUCOSE BLD STRIP.AUTO-MCNC: 162 MG/DL (ref 65–100)
GLUCOSE SERPL-MCNC: 143 MG/DL (ref 65–100)
HCT VFR BLD AUTO: 24.8 % (ref 35–47)
HGB BLD-MCNC: 8 G/DL (ref 11.5–16)
MAGNESIUM SERPL-MCNC: 2.4 MG/DL (ref 1.6–2.4)
MCH RBC QN AUTO: 31 PG (ref 26–34)
MCHC RBC AUTO-ENTMCNC: 32.3 G/DL (ref 30–36.5)
MCV RBC AUTO: 96.1 FL (ref 80–99)
NRBC # BLD: 0 K/UL (ref 0–0.01)
NRBC BLD-RTO: 0 PER 100 WBC
PLATELET # BLD AUTO: 129 K/UL (ref 150–400)
PMV BLD AUTO: 11.8 FL (ref 8.9–12.9)
POTASSIUM SERPL-SCNC: 4.7 MMOL/L (ref 3.5–5.1)
RBC # BLD AUTO: 2.58 M/UL (ref 3.8–5.2)
SERVICE CMNT-IMP: ABNORMAL
SODIUM SERPL-SCNC: 133 MMOL/L (ref 136–145)
WBC # BLD AUTO: 18.8 K/UL (ref 3.6–11)

## 2020-02-08 PROCEDURE — 82962 GLUCOSE BLOOD TEST: CPT

## 2020-02-08 PROCEDURE — 71045 X-RAY EXAM CHEST 1 VIEW: CPT

## 2020-02-08 PROCEDURE — 74011250637 HC RX REV CODE- 250/637: Performed by: THORACIC SURGERY (CARDIOTHORACIC VASCULAR SURGERY)

## 2020-02-08 PROCEDURE — 65660000001 HC RM ICU INTERMED STEPDOWN

## 2020-02-08 PROCEDURE — 80048 BASIC METABOLIC PNL TOTAL CA: CPT

## 2020-02-08 PROCEDURE — 74011250637 HC RX REV CODE- 250/637: Performed by: PHYSICIAN ASSISTANT

## 2020-02-08 PROCEDURE — 83735 ASSAY OF MAGNESIUM: CPT

## 2020-02-08 PROCEDURE — 74011250637 HC RX REV CODE- 250/637: Performed by: NURSE PRACTITIONER

## 2020-02-08 PROCEDURE — 74011636637 HC RX REV CODE- 636/637: Performed by: NURSE PRACTITIONER

## 2020-02-08 PROCEDURE — 36415 COLL VENOUS BLD VENIPUNCTURE: CPT

## 2020-02-08 PROCEDURE — 85027 COMPLETE CBC AUTOMATED: CPT

## 2020-02-08 RX ORDER — ASCORBIC ACID 500 MG
1000 TABLET ORAL DAILY
Status: DISCONTINUED | OUTPATIENT
Start: 2020-02-08 | End: 2020-02-12 | Stop reason: HOSPADM

## 2020-02-08 RX ORDER — POTASSIUM CHLORIDE 750 MG/1
20 TABLET, FILM COATED, EXTENDED RELEASE ORAL DAILY
Status: DISCONTINUED | OUTPATIENT
Start: 2020-02-08 | End: 2020-02-09

## 2020-02-08 RX ORDER — FUROSEMIDE 40 MG/1
40 TABLET ORAL DAILY
Status: DISCONTINUED | OUTPATIENT
Start: 2020-02-08 | End: 2020-02-08

## 2020-02-08 RX ORDER — FUROSEMIDE 40 MG/1
40 TABLET ORAL DAILY
Status: DISCONTINUED | OUTPATIENT
Start: 2020-02-08 | End: 2020-02-09

## 2020-02-08 RX ORDER — AMLODIPINE BESYLATE 5 MG/1
5 TABLET ORAL DAILY
Status: DISCONTINUED | OUTPATIENT
Start: 2020-02-08 | End: 2020-02-10

## 2020-02-08 RX ORDER — POTASSIUM CHLORIDE 750 MG/1
20 TABLET, FILM COATED, EXTENDED RELEASE ORAL DAILY
Status: DISCONTINUED | OUTPATIENT
Start: 2020-02-08 | End: 2020-02-08

## 2020-02-08 RX ADMIN — POLYETHYLENE GLYCOL 3350 17 G: 17 POWDER, FOR SOLUTION ORAL at 08:41

## 2020-02-08 RX ADMIN — SENNOSIDES AND DOCUSATE SODIUM 1 TABLET: 8.6; 5 TABLET ORAL at 17:50

## 2020-02-08 RX ADMIN — AMIODARONE HYDROCHLORIDE 400 MG: 200 TABLET ORAL at 08:41

## 2020-02-08 RX ADMIN — CHLORHEXIDINE GLUCONATE 10 ML: 1.2 RINSE ORAL at 21:55

## 2020-02-08 RX ADMIN — OXYCODONE HYDROCHLORIDE AND ACETAMINOPHEN 1000 MG: 500 TABLET ORAL at 08:41

## 2020-02-08 RX ADMIN — FUROSEMIDE 40 MG: 40 TABLET ORAL at 17:50

## 2020-02-08 RX ADMIN — INSULIN LISPRO 2 UNITS: 100 INJECTION, SOLUTION INTRAVENOUS; SUBCUTANEOUS at 11:57

## 2020-02-08 RX ADMIN — AMIODARONE HYDROCHLORIDE 400 MG: 200 TABLET ORAL at 21:53

## 2020-02-08 RX ADMIN — CHLORHEXIDINE GLUCONATE 10 ML: 1.2 RINSE ORAL at 08:41

## 2020-02-08 RX ADMIN — INSULIN LISPRO 2 UNITS: 100 INJECTION, SOLUTION INTRAVENOUS; SUBCUTANEOUS at 07:21

## 2020-02-08 RX ADMIN — AMLODIPINE BESYLATE 5 MG: 5 TABLET ORAL at 11:57

## 2020-02-08 RX ADMIN — SENNOSIDES AND DOCUSATE SODIUM 1 TABLET: 8.6; 5 TABLET ORAL at 08:41

## 2020-02-08 RX ADMIN — ASPIRIN 81 MG 81 MG: 81 TABLET ORAL at 08:41

## 2020-02-08 RX ADMIN — ACETAMINOPHEN 650 MG: 325 TABLET ORAL at 11:57

## 2020-02-08 RX ADMIN — METOPROLOL TARTRATE 50 MG: 50 TABLET, FILM COATED ORAL at 21:53

## 2020-02-08 RX ADMIN — MUPIROCIN: 20 OINTMENT TOPICAL at 17:50

## 2020-02-08 RX ADMIN — PRAVASTATIN SODIUM 20 MG: 20 TABLET ORAL at 08:41

## 2020-02-08 RX ADMIN — MAGNESIUM OXIDE TAB 400 MG (241.3 MG ELEMENTAL MG) 400 MG: 400 (241.3 MG) TAB at 08:41

## 2020-02-08 RX ADMIN — Medication 10 ML: at 21:55

## 2020-02-08 RX ADMIN — POTASSIUM CHLORIDE 20 MEQ: 750 TABLET, FILM COATED, EXTENDED RELEASE ORAL at 17:50

## 2020-02-08 RX ADMIN — MAGNESIUM OXIDE TAB 400 MG (241.3 MG ELEMENTAL MG) 400 MG: 400 (241.3 MG) TAB at 17:50

## 2020-02-08 RX ADMIN — MUPIROCIN: 20 OINTMENT TOPICAL at 08:41

## 2020-02-08 RX ADMIN — METOPROLOL TARTRATE 50 MG: 50 TABLET, FILM COATED ORAL at 08:41

## 2020-02-08 RX ADMIN — FERROUS SULFATE TAB 325 MG (65 MG ELEMENTAL FE) 325 MG: 325 (65 FE) TAB at 08:41

## 2020-02-08 RX ADMIN — PANTOPRAZOLE SODIUM 40 MG: 40 TABLET, DELAYED RELEASE ORAL at 07:20

## 2020-02-08 NOTE — PROGRESS NOTES
Problem: Pressure Injury - Risk of  Goal: *Prevention of pressure injury  Description  Document Richard Scale and appropriate interventions in the flowsheet. Outcome: Progressing Towards Goal  Note: Pressure Injury Interventions:  Sensory Interventions: Assess changes in LOC, Avoid rigorous massage over bony prominences, Float heels, Keep linens dry and wrinkle-free, Maintain/enhance activity level, Minimize linen layers, Pressure redistribution bed/mattress (bed type), Sit a 90-degree angle/use footstool if needed, Turn and reposition approx. every two hours (pillows and wedges if needed)    Moisture Interventions: Check for incontinence Q2 hours and as needed, Minimize layers, Offer toileting Q_hr    Activity Interventions: Increase time out of bed, Pressure redistribution bed/mattress(bed type), PT/OT evaluation    Mobility Interventions: Float heels, Chair cushion, HOB 30 degrees or less, Pressure redistribution bed/mattress (bed type), PT/OT evaluation    Nutrition Interventions: Document food/fluid/supplement intake    Friction and Shear Interventions: HOB 30 degrees or less, Lift sheet, Minimize layers                Problem: Falls - Risk of  Goal: *Absence of Falls  Description  Document Carlo Fall Risk and appropriate interventions in the flowsheet.   Outcome: Progressing Towards Goal  Note: Fall Risk Interventions:  Mobility Interventions: Communicate number of staff needed for ambulation/transfer, OT consult for ADLs, Patient to call before getting OOB, PT Consult for mobility concerns, PT Consult for assist device competence, Utilize gait belt for transfers/ambulation    Mentation Interventions: Adequate sleep, hydration, pain control, Door open when patient unattended, More frequent rounding, Room close to nurse's station, Self-releasing belt, Update white board    Medication Interventions: Evaluate medications/consider consulting pharmacy    Elimination Interventions: Call light in reach, Patient to call for help with toileting needs              Problem:  Aortic Aneurysm Repair: Post Op Day 3  Goal: Activity/Safety  Outcome: Progressing Towards Goal  Goal: Consults, if ordered  Outcome: Progressing Towards Goal  Goal: Diagnostic Test/Procedures  Outcome: Progressing Towards Goal  Goal: Nutrition/Diet  Outcome: Progressing Towards Goal  Goal: Discharge Planning  Outcome: Progressing Towards Goal  Goal: Medications  Outcome: Progressing Towards Goal  Goal: Respiratory  Outcome: Progressing Towards Goal  Goal: Treatments/Interventions/Procedures  Outcome: Progressing Towards Goal  Goal: Psychosocial  Outcome: Progressing Towards Goal  Goal: *Hemodynamically stable  Outcome: Progressing Towards Goal  Goal: *Lungs clear or at baseline  Outcome: Progressing Towards Goal  Goal: *Afebrile  Outcome: Progressing Towards Goal  Goal: *Optimal pain control at patient's stated goal  Outcome: Progressing Towards Goal  Goal: *Demonstrates progressive activity  Outcome: Progressing Towards Goal  Goal: *Tolerating diet  Outcome: Progressing Towards Goal  Goal: *Absence of infection signs and symptoms  Outcome: Progressing Towards Goal  Goal: *Absence of signs and symptoms of DVT  Outcome: Progressing Towards Goal

## 2020-02-08 NOTE — PROGRESS NOTES
TRANSFER - IN REPORT:    Verbal report received from [facility]) on Cortez Armijo  being received from [facility](unit) for routine progression of care      Report consisted of patients Situation, Background, Assessment and   Recommendations(SBAR). Information from the following report(s) SBAR, Kardex, Procedure Summary, Intake/Output, MAR, Recent Results and Cardiac Rhythm NSR was reviewed with the receiving nurse. Opportunity for questions and clarification was provided. Assessment completed upon patients arrival to unit and care assumed. Bedside and Verbal shift change report given to [employer] (oncoming nurse) by [facility] RN (offgoing nurse). Report included the following information SBAR, Kardex, Procedure Summary, Intake/Output, MAR, Recent Results and Cardiac Rhythm NSR.

## 2020-02-08 NOTE — PROGRESS NOTES
Bedside and Verbal shift change report given to [facility] (oncoming nurse) by Laura Turner (offgoing nurse). Report included the following information SBAR, Kardex, Procedure Summary, Intake/Output, Accordion, Recent Results and Cardiac Rhythm NSR. Cardiac Surgery Shift Summary:    1. I/O's: Intake:   Meals: 50-75% of each meal (good)     Output: WDL (voiding without difficulty)   Has patient had BM since surgery? Yes    2. Oxygen Requirements: WDL (on room air)    3. Daily Weights (weight change in 24 hours): No significant change in weight (0 - 2 lbs.)    4. Medication Administration: No variations to medication administration             Bedside shift change report given to Laura Turner (oncoming nurse) by [facility] (offgoing nurse). Report included the following information SBAR, Kardex, Procedure Summary, Intake/Output, MAR, Recent Results and Cardiac Rhythm NSR.

## 2020-02-08 NOTE — PROGRESS NOTES
Problem: Self Care Deficits Care Plan (Adult)  Goal: *Acute Goals and Plan of Care (Insert Text)  Description  FUNCTIONAL STATUS PRIOR TO ADMISSION: Patient was independent and active without use of DME.     HOME SUPPORT: The patient lived with  but did not need assistance.  and pt split household tasks,  cooked. Both are retired. Occupational Therapy Goals  Initiated 2/6/2020  1. Patient will perform ADLs standing 5 mins without fatigue or LOB with supervision/set-up within 7 day(s). 2.  Patient will perform lower body ADLs with supervision/set-up within 7 day(s). 3.  Patient will perform gathering ADL items high and low 2/2 with supervision/set-up within 7 day(s). 4.  Patient will perform toilet transfers with supervision/set-up within 7 day(s). 5.  Patient will perform all aspects of toileting with supervision/set-up within 7 day(s). 6.  Patient will participate in cardiac/sternal upper extremity therapeutic exercise/activities to increase independence with ADLs with supervision/set-up for 5 minutes within 7 day(s). Outcome: Progressing Towards Goal       OCCUPATIONAL THERAPY TREATMENT  Patient: Davian Monahan (14 y.o. female)  Date: 2/8/2020  Diagnosis: Aortic dissection ([facility] [address] [I71.00]   S/P aortic dissection repair  Procedure(s) (LRB):  REPAIR TYPE A  AORTIC DISSECTION, DEEP HYPOTHERMIC CIRCULATORY ARREST, RIGHT FEMORAL CUTDOWN, ECC, SLOAN AND EPIAORTIC U/S BY DR TOLEDO SLOAN BY DR. Tami Jones. (N/A) 3 Days Post-Op  Precautions: Fall, Sternal(SBP <140)  Chart, occupational therapy assessment, plan of care, and goals were reviewed. ASSESSMENT  Patient continues with skilled OT services and is progressing towards goals. Pt remains cooperative with therapy but requires verbal cues for mindful movements during bed mobility and transfers. Pt demonstrated slow, cautious functional mobility without AD, increased speed with verbal cues.  Pt completed standing ADL at sink with SBA and 3/6 cardiac exercises seated with occasional tactile cues for technique. VSS pre and post activity via 2LNC. Patient is not verbalizing and is not demonstrating understanding of mindful-based movements (\"move in the tube\") principles of keeping UEs proximal to ribcage to prevent lateral pull on the sternum during load-bearing activities with visual, verbal, and tactile cues required for compliance. Current Level of Function Impacting Discharge (ADLs): CGA to mod A ADLs    Other factors to consider for discharge: lives with family         PLAN :  Patient continues to benefit from skilled intervention to address the above impairments. Continue treatment per established plan of care. to address goals. Recommend with staff: OOB to chair meals, participation in ADLs    Recommend next OT session: dressing tasks    Recommendation for discharge: (in order for the patient to meet his/her long term goals)  Occupational therapy at least 2 days/week in the home AND ensure assist and/or supervision for safety with IADLs     This discharge recommendation:  Has been made in collaboration with the attending provider and/or case management    IF patient discharges home will need the following DME: none       SUBJECTIVE:   Patient stated I will get up.     OBJECTIVE DATA SUMMARY:   Cognitive/Behavioral Status:                      Functional Mobility and Transfers for ADLs:  Bed Mobility:   Supine to sit: min A x 2  Sit to supine: min A x 2    Transfers:      Sit to stand: CGA, additional time and cues for mindful movements       Balance:   Intact sitting, impaired standing without use of AD    ADL Intervention:          Pt completed sit to stand from EOB with CGA and cues for mindful movements. Pt completed standing ADLs at sink with SBA.                                  Patient instructed and educated on mindful movement principles based on Move in The Tube concept to include maintaining bilateral elbows close to rib cage when performing any load-bearing activity such as getting in/out of bed, pushing up from a chair, opening a door, or lifting a box. Patient was given a handout with diagrams of each correct/incorrect method of performing each of the above tasks. Patient instructed no asymmetrical reaching over head to ensure B UEs when shoulders >90* i.e. reaching in cabinets and dressing. Instruction on upper body dressing techniques of over head, then arms through to decrease pain and unilateral shoulder flexion >90*. Instruction on the benefits of utilizing B UEs during functional tasks i.e. opening the fridge, stepping into the tub. Therapeutic Exercises:   Patient instructed on the benefits and demonstrated cardiac exercises while seated with Supervision. Instructed and indicated understanding on how to progress reps, sets against gravity, pacing through progressive muscle strengthening standing based on surgeon clearance for more weight in prep for basic and instrumental ADLs. Instruction on the use of household items in place of weights as needed. CARDIAC   EXERCISE    Sets    Reps    Active  Active Assist    Passive  Self ROM    Comments    Shoulder flexion  1  5   [x]                            []                             []                             []                                Scapular elevation  1  5  [x]                             []                              []                             []                                Scapular retraction  1  5  [x]                             []                             []                             []                                    Pain:  none    Activity Tolerance:   Good  Please refer to the flowsheet for vital signs taken during this treatment.     After treatment patient left in no apparent distress:   Supine in bed, Heels elevated for pressure relief, and Call bell within reach    COMMUNICATION/COLLABORATION:   The patients plan of care was discussed with: Physical Therapist and Registered Nurse    Essie Ceballos, OT  Time Calculation: 20 mins

## 2020-02-08 NOTE — PROGRESS NOTES
[facility] Progress Note    Admit Date: 2020  POD:  3    Procedure:  Procedure(s):  REPAIR TYPE A  AORTIC DISSECTION/ RIGHT FEMORAL CUTDOWN. SLOAN BY DR. Alena Obregon. Subjective:   Patient seen with Dr. Pruett Sarpy. Doing well, off O2. No complaints. Minimal chest tube output. Objective:   Vitals:  Blood pressure 152/78, pulse 79, temperature 97.8 °F (36.6 °C), resp. rate 16, height 5' 4\" (1.626 m), weight 162 lb 11.2 oz (73.8 kg), SpO2 95 %. Temp (24hrs), Av.2 °F (36.8 °C), Min:97.4 °F (36.3 °C), Max:98.8 °F (37.1 °C)    EKG/Rhythm:  SR in the 80s    CT Output: 50 cc in 12 hrs    Oxygen Therapy:  Oxygen Therapy  O2 Sat (%): 95 % (20 1109)  Pulse via Oximetry: 82 beats per minute (20 0900)  O2 Device: Room air (20 1109)  O2 Flow Rate (L/min): 2 l/min (20 0807)  FIO2 (%): 50 % (20 1141)    CXR:   CXR Results  (Last 48 hours)               20 0417  XR CHEST PORT Final result    Impression:  IMPRESSION: Stable pleural effusions and bibasilar opacities. Narrative:  EXAM:  CR chest portable       INDICATION: Pleural effusions and bibasilar opacities. COMPARISON: 2020 at 0411 hours. TECHNIQUE: Portable AP semiupright chest view at 0333 hours       FINDINGS: The support devices are stable. The cardiomediastinal contours are   stable. There are stable pleural effusions and bibasilar opacities. There is no   pneumothorax. The bones and upper abdomen are stable. 20 0504  XR CHEST PORT Final result    Impression:  IMPRESSION:   1. Persistent moderate bilateral pleural effusions left greater than right with   bibasilar atelectasis                               Narrative:  EXAM: XR CHEST PORT       INDICATION: postop heart surgery       COMPARISON: 2020       FINDINGS: A portable AP radiograph of the chest was obtained at 0411 hours. The   patient is on a cardiac monitor. Mediastinal drains remain in place.  There is no   change in the enlarged cardiac silhouette. Moderate bilateral pleural effusions   left greater than right with bibasilar atelectasis are noted and unchanged. No   pulmonary edema no pneumothorax. .                 Admission Weight: Last Weight   Weight: 149 lb 14.6 oz (68 kg) Weight: 162 lb 11.2 oz (73.8 kg)     Intake / Output / Drain:  Current Shift: 02/08 0701 - 02/08 1900  In: 440 [P.O.:440]  Out: 710 [Urine:700; Drains:10]  Last 24 hrs.:     Intake/Output Summary (Last 24 hours) at 2/8/2020 1139  Last data filed at 2/8/2020 1109  Gross per 24 hour   Intake 530.04 ml   Output 2695 ml   Net -2164.96 ml       EXAM:  General:  Flat affect, no distress                                                                                            Lungs:   Diminished bases bilat. Incision:  Dsg C/D/I - Prevena intact   Heart:  Regular rate and rhythm, S1, S2 normal, no murmur, click, rub or gallop. Abdomen:   Soft, non-tender. Bowel sounds hypoactive. No masses,  No organomegaly. Extremities:  No edema. Palpable pulses bilat    Neurologic:  Gross motor and sensory apparatus intact      Labs:   Recent Labs     02/08/20  1107  02/08/20  0352  02/05/20  1526   WBC  --   --  18.8*   < >  --    HGB  --   --  8.0*   < > 8.2*   HCT  --   --  24.8*   < > 25.6*   PLT  --   --  129*   < >  --    NA  --   --  133*   < > 146*   K  --   --  4.7   < > 4.0   BUN  --   --  17   < > 14   CREA  --   --  0.79   < > 0.92   GLU  --   --  143*   < > 138*   GLUCPOC 151*   < >  --    < >  --    INR  --   --   --   --  1.1    < > = values in this interval not displayed. Assessment:     Principal Problem:    S/P aortic dissection repair (2/5/2020)      Overview: Type A Aortic Dissection Repair, 24mm Hemashield Graft      Right Femoral Cutdown for Peripheral Cannulation      DHCA, Antegrade Cerebral Perfusion    Active Problems:     Aortic dissection (HCC) (2/5/2020)      Postoperative anemia due to acute blood loss (2/5/2020) Plan/Recommendations/Medical Decision Makin. S/p emergent Type A dissection: BP control, SBP < 140. Increase BB. PRN Hydralazine if needed. 2. Small L pleural effusion/atelectasis: IS and TCDB. Increase activity. Wean oxygen to keep sat >92%. Diurese today    3. Anemia: expected, postop. Monitor H&H and CT output. Repeat H&H 7.1, avoid transfusion for now. Start iron as diet advanced    4. HTN: increase BB, start norvasc 5 mg daily    5. HLD: Continue home pravastatin    6. Hx of MR: ? Listed in chart, assessed on intra op SLOAN and no MR seen per verbal report    7. Thrombocytopenia: resolving. Monitor. 8. DVT/GI: SCD's, protonix    9. Leukocytosis: Afebrile. Increase pulm toileting. Culture if temp > 101F. Dispo: PT/OT/Cardiac Rehab. Case Management to assess for DC needs. Home 1-2 days. Chest tubes & pacing wires removed 0930. A wires cut due to resistance met. V wire removed without difficulties. Patient tolerated well. PREVENA dressing removed in order to get to the wires. Incision CDI. Bedrest x 1 hr. RN notified.     Signed By: GAIL Harrell

## 2020-02-08 NOTE — PROGRESS NOTES
1930: Bedside shift change report given to Kem Stevens (oncoming nurse) by Teresa Crum (offgoing nurse). Report included the following information SBAR, Kardex, Intake/Output, MAR, Recent Results, and Cardiac Rhythm NSR .     2100:  PRN hydralazine given for /77.      2220: /62              Problem: Pressure Injury - Risk of  Goal: *Prevention of pressure injury  Description  Document Richard Scale and appropriate interventions in the flowsheet. 2/7/2020 2311 by Emily Stager  Outcome: Progressing Towards Goal  Note: Pressure Injury Interventions:  Sensory Interventions: Assess changes in LOC, Avoid rigorous massage over bony prominences, Float heels, Keep linens dry and wrinkle-free, Maintain/enhance activity level, Minimize linen layers, Pressure redistribution bed/mattress (bed type), Sit a 90-degree angle/use footstool if needed, Turn and reposition approx. every two hours (pillows and wedges if needed)    Moisture Interventions: Check for incontinence Q2 hours and as needed, Minimize layers, Offer toileting Q_hr    Activity Interventions: Increase time out of bed, Pressure redistribution bed/mattress(bed type), PT/OT evaluation    Mobility Interventions: Pressure redistribution bed/mattress (bed type), PT/OT evaluation    Nutrition Interventions: Document food/fluid/supplement intake, Offer support with meals,snacks and hydration    Friction and Shear Interventions: HOB 30 degrees or less, Lift sheet, Minimize layers             2/7/2020 2218 by Emily Stager  Note: Pressure Injury Interventions:  Sensory Interventions: Assess changes in LOC, Avoid rigorous massage over bony prominences, Float heels, Keep linens dry and wrinkle-free, Maintain/enhance activity level, Minimize linen layers, Pressure redistribution bed/mattress (bed type), Sit a 90-degree angle/use footstool if needed, Turn and reposition approx.  every two hours (pillows and wedges if needed)    Moisture Interventions: Check for incontinence Q2 hours and as needed, Minimize layers, Offer toileting Q_hr    Activity Interventions: Increase time out of bed, Pressure redistribution bed/mattress(bed type), PT/OT evaluation    Mobility Interventions: Float heels, HOB 30 degrees or less, Pressure redistribution bed/mattress (bed type), PT/OT evaluation, Turn and reposition approx. every two hours(pillow and wedges)    Nutrition Interventions: Document food/fluid/supplement intake, Offer support with meals,snacks and hydration    Friction and Shear Interventions: HOB 30 degrees or less, Lift sheet, Minimize layers                Problem: Falls - Risk of  Goal: *Absence of Falls  Description  Document Carlo Fall Risk and appropriate interventions in the flowsheet.   2/7/2020 2311 by [facility]  Outcome: Progressing Towards Goal  Note: Fall Risk Interventions:  Mobility Interventions: Communicate number of staff needed for ambulation/transfer, OT consult for ADLs, Patient to call before getting OOB, PT Consult for mobility concerns    Mentation Interventions: Adequate sleep, hydration, pain control, Door open when patient unattended, More frequent rounding, Room close to nurse's station, Self-releasing belt, Update white board    Medication Interventions: Patient to call before getting OOB, Teach patient to arise slowly    Elimination Interventions: Call light in reach, Patient to call for help with toileting needs           2/7/2020 2218 by [facility]  Outcome: Progressing Towards Goal  Note: Fall Risk Interventions:  Mobility Interventions: Communicate number of staff needed for ambulation/transfer, Patient to call before getting OOB    Mentation Interventions: Adequate sleep, hydration, pain control, Door open when patient unattended, More frequent rounding, Room close to nurse's station, Self-releasing belt, Update white board    Medication Interventions: Evaluate medications/consider consulting pharmacy, Patient to call before getting OOB, Teach patient to arise slowly    Elimination Interventions: Call light in reach, Toileting schedule/hourly rounds              Problem: Aortic Aneurysm Repair: Post Op Day 2  Goal: *Hemodynamically stable  2/7/2020 2311 by Ashok SHEN  Outcome: Progressing Towards Goal  Note:   VSS  2/7/2020 2218 by Marlen Donovan  Outcome: Progressing Towards Goal  Note:   VSS. PRN hydralazine for SBP >140. Given at 2100  Goal: *Lungs clear or at baseline  2/7/2020 2311 by Marlen Donovan  Outcome: Progressing Towards Goal  Note:   Lung sounds are clear, but diminished. Encouraged IS. On 2 L NC.  2/7/2020 2218 by Marlen Donovan  Outcome: Progressing Towards Goal  Note:   Lung sounds are clear, but diminished. Goal: *Optimal pain control at patient's stated goal  2/7/2020 2311 by Marlen Donovan  Outcome: Progressing Towards Goal  Note:   No c/o pain. 2/7/2020 2218 by Ashok SHEN  Outcome: Progressing Towards Goal  Note:   No c/o pain. Goal: *Absence of infection signs and symptoms  2/7/2020 2311 by Marlen Donovan  Outcome: Progressing Towards Goal  Note:   No s/s of infection. 2/7/2020 2218 by Marlen Donovan  Outcome: Progressing Towards Goal  Note:   No s/s of infection.

## 2020-02-09 ENCOUNTER — APPOINTMENT (OUTPATIENT)
Dept: GENERAL RADIOLOGY | Age: 75
DRG: 220 | End: 2020-02-09
Attending: PHYSICIAN ASSISTANT
Payer: MEDICARE

## 2020-02-09 LAB
ANION GAP SERPL CALC-SCNC: 4 MMOL/L (ref 5–15)
ARTERIAL PATENCY WRIST A: ABNORMAL
BASE EXCESS BLD CALC-SCNC: 0 MMOL/L
BDY SITE: ABNORMAL
BUN SERPL-MCNC: 16 MG/DL (ref 6–20)
BUN/CREAT SERPL: 22 (ref 12–20)
CA-I BLD-SCNC: 1.21 MMOL/L (ref 1.12–1.32)
CALCIUM SERPL-MCNC: 7.8 MG/DL (ref 8.5–10.1)
CHLORIDE SERPL-SCNC: 110 MMOL/L (ref 97–108)
CO2 SERPL-SCNC: 24 MMOL/L (ref 21–32)
CREAT SERPL-MCNC: 0.74 MG/DL (ref 0.55–1.02)
ERYTHROCYTE [DISTWIDTH] IN BLOOD BY AUTOMATED COUNT: 13.7 % (ref 11.5–14.5)
GAS FLOW.O2 O2 DELIVERY SYS: ABNORMAL L/MIN
GAS FLOW.O2 SETTING OXYMISER: 12 BPM
GLUCOSE BLD STRIP.AUTO-MCNC: 123 MG/DL (ref 65–100)
GLUCOSE SERPL-MCNC: 113 MG/DL (ref 65–100)
HCO3 BLD-SCNC: 25.8 MMOL/L (ref 22–26)
HCT VFR BLD AUTO: 25.3 % (ref 35–47)
HGB BLD-MCNC: 8.1 G/DL (ref 11.5–16)
MAGNESIUM SERPL-MCNC: 2.3 MG/DL (ref 1.6–2.4)
MCH RBC QN AUTO: 31 PG (ref 26–34)
MCHC RBC AUTO-ENTMCNC: 32 G/DL (ref 30–36.5)
MCV RBC AUTO: 96.9 FL (ref 80–99)
NRBC # BLD: 0.02 K/UL (ref 0–0.01)
NRBC BLD-RTO: 0.1 PER 100 WBC
O2/TOTAL GAS SETTING VFR VENT: 60 %
PCO2 BLD: 46.5 MMHG (ref 35–45)
PEEP RESPIRATORY: 5 CMH2O
PH BLD: 7.35 [PH] (ref 7.35–7.45)
PLATELET # BLD AUTO: 141 K/UL (ref 150–400)
PMV BLD AUTO: 11.7 FL (ref 8.9–12.9)
PO2 BLD: 38 MMHG (ref 80–100)
POTASSIUM SERPL-SCNC: 5 MMOL/L (ref 3.5–5.1)
PRESSURE SUPPORT SETTING VENT: 10 CMH2O
RBC # BLD AUTO: 2.61 M/UL (ref 3.8–5.2)
SAO2 % BLD: 69 % (ref 92–97)
SERVICE CMNT-IMP: ABNORMAL
SODIUM SERPL-SCNC: 138 MMOL/L (ref 136–145)
SPECIMEN TYPE: ABNORMAL
TOTAL RESP. RATE, ITRR: 12
VENTILATION MODE VENT: ABNORMAL
VOLUME CONTROL IVLC: YES
VT SETTING VENT: 380 ML
WBC # BLD AUTO: 13.6 K/UL (ref 3.6–11)

## 2020-02-09 PROCEDURE — 85027 COMPLETE CBC AUTOMATED: CPT

## 2020-02-09 PROCEDURE — 74011250637 HC RX REV CODE- 250/637: Performed by: NURSE PRACTITIONER

## 2020-02-09 PROCEDURE — 82962 GLUCOSE BLOOD TEST: CPT

## 2020-02-09 PROCEDURE — 74011250636 HC RX REV CODE- 250/636: Performed by: PHYSICIAN ASSISTANT

## 2020-02-09 PROCEDURE — 74011250637 HC RX REV CODE- 250/637: Performed by: PHYSICIAN ASSISTANT

## 2020-02-09 PROCEDURE — 71046 X-RAY EXAM CHEST 2 VIEWS: CPT

## 2020-02-09 PROCEDURE — 65660000001 HC RM ICU INTERMED STEPDOWN

## 2020-02-09 PROCEDURE — 83735 ASSAY OF MAGNESIUM: CPT

## 2020-02-09 PROCEDURE — 80048 BASIC METABOLIC PNL TOTAL CA: CPT

## 2020-02-09 PROCEDURE — 36415 COLL VENOUS BLD VENIPUNCTURE: CPT

## 2020-02-09 PROCEDURE — 97530 THERAPEUTIC ACTIVITIES: CPT

## 2020-02-09 RX ORDER — FUROSEMIDE 40 MG/1
40 TABLET ORAL DAILY
Status: DISCONTINUED | OUTPATIENT
Start: 2020-02-10 | End: 2020-02-09

## 2020-02-09 RX ORDER — FUROSEMIDE 10 MG/ML
40 INJECTION INTRAMUSCULAR; INTRAVENOUS ONCE
Status: COMPLETED | OUTPATIENT
Start: 2020-02-09 | End: 2020-02-09

## 2020-02-09 RX ADMIN — METOPROLOL TARTRATE 50 MG: 50 TABLET, FILM COATED ORAL at 09:02

## 2020-02-09 RX ADMIN — Medication 10 ML: at 07:19

## 2020-02-09 RX ADMIN — MAGNESIUM OXIDE TAB 400 MG (241.3 MG ELEMENTAL MG) 400 MG: 400 (241.3 MG) TAB at 09:02

## 2020-02-09 RX ADMIN — ASPIRIN 81 MG 81 MG: 81 TABLET ORAL at 09:02

## 2020-02-09 RX ADMIN — PRAVASTATIN SODIUM 20 MG: 20 TABLET ORAL at 09:02

## 2020-02-09 RX ADMIN — CHLORHEXIDINE GLUCONATE 10 ML: 1.2 RINSE ORAL at 10:14

## 2020-02-09 RX ADMIN — AMIODARONE HYDROCHLORIDE 400 MG: 200 TABLET ORAL at 21:33

## 2020-02-09 RX ADMIN — MUPIROCIN: 20 OINTMENT TOPICAL at 18:05

## 2020-02-09 RX ADMIN — AMLODIPINE BESYLATE 5 MG: 5 TABLET ORAL at 09:02

## 2020-02-09 RX ADMIN — MUPIROCIN: 20 OINTMENT TOPICAL at 10:14

## 2020-02-09 RX ADMIN — MAGNESIUM OXIDE TAB 400 MG (241.3 MG ELEMENTAL MG) 400 MG: 400 (241.3 MG) TAB at 18:04

## 2020-02-09 RX ADMIN — FERROUS SULFATE TAB 325 MG (65 MG ELEMENTAL FE) 325 MG: 325 (65 FE) TAB at 09:02

## 2020-02-09 RX ADMIN — PANTOPRAZOLE SODIUM 40 MG: 40 TABLET, DELAYED RELEASE ORAL at 07:11

## 2020-02-09 RX ADMIN — OXYCODONE HYDROCHLORIDE AND ACETAMINOPHEN 1000 MG: 500 TABLET ORAL at 09:01

## 2020-02-09 RX ADMIN — FUROSEMIDE 40 MG: 10 INJECTION, SOLUTION INTRAMUSCULAR; INTRAVENOUS at 07:12

## 2020-02-09 RX ADMIN — CHLORHEXIDINE GLUCONATE 10 ML: 1.2 RINSE ORAL at 23:51

## 2020-02-09 RX ADMIN — AMIODARONE HYDROCHLORIDE 400 MG: 200 TABLET ORAL at 09:02

## 2020-02-09 RX ADMIN — Medication 10 ML: at 21:34

## 2020-02-09 RX ADMIN — SENNOSIDES AND DOCUSATE SODIUM 1 TABLET: 8.6; 5 TABLET ORAL at 09:01

## 2020-02-09 RX ADMIN — SENNOSIDES AND DOCUSATE SODIUM 1 TABLET: 8.6; 5 TABLET ORAL at 18:04

## 2020-02-09 NOTE — PROGRESS NOTES
1930 Bedside shift change report given to Allie Melton RN and BRIGIDA Oh Student Nurse (oncoming nurse) by Lavon Perez (offgoing nurse). Report included the following information SBAR, Kardex, Intake/Output, MAR, Recent Results, and Cardiac Rhythm NSR .     0720 PT had uneventful night. Problem: Pressure Injury - Risk of  Goal: *Prevention of pressure injury  Description  Document Richard Scale and appropriate interventions in the flowsheet. Outcome: Progressing Towards Goal  Note: Pressure Injury Interventions:  Sensory Interventions: Assess changes in LOC, Keep linens dry and wrinkle-free, Minimize linen layers, Turn and reposition approx. every two hours (pillows and wedges if needed)    Moisture Interventions: Minimize layers    Activity Interventions: Increase time out of bed, Pressure redistribution bed/mattress(bed type)    Mobility Interventions: HOB 30 degrees or less, Turn and reposition approx. every two hours(pillow and wedges)    Nutrition Interventions: Document food/fluid/supplement intake    Friction and Shear Interventions: HOB 30 degrees or less, Minimize layers                Problem: Falls - Risk of  Goal: *Absence of Falls  Description  Document Carlo Fall Risk and appropriate interventions in the flowsheet. Outcome: Progressing Towards Goal  Note: Fall Risk Interventions:  Mobility Interventions: Communicate number of staff needed for ambulation/transfer, Patient to call before getting OOB    Mentation Interventions: Adequate sleep, hydration, pain control    Medication Interventions: Patient to call before getting OOB, Teach patient to arise slowly    Elimination Interventions: Call light in reach, Patient to call for help with toileting needs, Toileting schedule/hourly rounds              Problem:  Aortic Aneurysm Repair: Post Op Day 3  Goal: *Hemodynamically stable  Outcome: Progressing Towards Goal  Note:   VSS  Goal: *Lungs clear or at baseline  Outcome: Progressing Towards Goal  Note:   Lung sounds clear but diminished on RA  Goal: *Optimal pain control at patient's stated goal  Outcome: Progressing Towards Goal  Note:   No c/o pain  Goal: *Absence of infection signs and symptoms  Outcome: Progressing Towards Goal  Note:   No s/s of infection  Goal: *Absence of signs and symptoms of DVT  Outcome: Progressing Towards Goal  Note:   No s/s of DVT

## 2020-02-09 NOTE — PROGRESS NOTES
Problem: Mobility Impaired (Adult and Pediatric)  Goal: *Acute Goals and Plan of Care (Insert Text)  Description  FUNCTIONAL STATUS PRIOR TO ADMISSION: Patient was independent and active without use of DME.     HOME SUPPORT PRIOR TO ADMISSION: The patient lived with her , but did not require assistance with mobility.  and patient split household tasks.  cooked. Both are retired. Physical Therapy Goals  Initiated 2/6/2020  1. Patient will move from supine to sit and sit to supine, scoot up and down and roll side to side in bed with modified independence within 5 days. 2.  Patient will perform sit to/from stand with independence within 5 days. 3.  Patient will ambulate 200 feet with supervision/set-up within 5 days. 4.  Patient will ascend/descend 10 stairs with single handrail(s) with supervision/set-up within 5 days. 5.  Patient will perform cardiac exercises per protocol with independence within 5 days. 6.  Patient will verbally recall and functionally demonstrate mindful-based movements (\"move in the tube\") principles without cues within 5 days. Outcome: Not Met   PHYSICAL THERAPY TREATMENT  Patient: Mariam Wiggins (71 y.o. female)  Date: 2/9/2020  Diagnosis: Aortic dissection ([address] [I71.00]   S/P aortic dissection repair  Procedure(s) (LRB):  REPAIR TYPE A  AORTIC DISSECTION, DEEP HYPOTHERMIC CIRCULATORY ARREST, RIGHT FEMORAL CUTDOWN, ECC, SLOAN AND EPIAORTIC U/S BY DR TOLEDO SLOAN BY DR. Nakita Lewis. (N/A) 4 Days Post-Op  Precautions: Fall, Sternal(SBP <140)  Chart, physical therapy assessment, plan of care and goals were reviewed. ASSESSMENT  Patient continues with skilled PT services; progress toward goals limited by symptomatic orthostasis this date. Per RN, pt with vasovagal episode in bathroom this morning. Pt reported general fatigue, but willing to attempt mobility as tolerated. Orthostatic check completed with positive findings and pt c/o feeling light headed.  Mobility training limited to transfer to chair. RN in at end of session and aware of pt BP issues. Pt will benefit from mobility progression as tolerated. Will address gait and stair training next session as tolerated. Pt demo good awareness of sternal precautions and move in tube philosophy. Demo compliance with bed mob and transfers. Current Level of Function Impacting Discharge (mobility/balance): bed mob min A, transfer stand pivot CGA/ min A    Other factors to consider for discharge: 2 level home, good family support         PLAN :  Patient continues to benefit from skilled intervention to address the above impairments. Continue treatment per established plan of care. to address goals. Recommendation for discharge: (in order for the patient to meet his/her long term goals)  Physical therapy at least 2 days/week in the home AND ensure assist and/or supervision for safety with mobility     This discharge recommendation:  Has not yet been discussed the attending provider and/or case management    IF patient discharges home will need the following DME: to be determined (TBD)       SUBJECTIVE:   Patient stated Everything went black when it happenedre hypotensive episode in bathroom in a.m.     OBJECTIVE DATA SUMMARY:   Critical Behavior:  Neurologic State: Alert  Orientation Level: Oriented X4  Cognition: Appropriate decision making, Appropriate for age attention/concentration, Appropriate safety awareness, Follows commands  Safety/Judgement: Decreased awareness of need for safety, Decreased awareness of need for assistance  Functional Mobility Training:  Bed Mobility:     Supine to Sit: Minimum assistance(assist at trunk)              Transfers:  Sit to Stand: Contact guard assistance;Minimum assistance  Stand to Sit: Contact guard assistance        Bed to Chair: Contact guard assistance;Minimum assistance(stand pivot to L)                    Balance:  Sitting: Intact  Standing: Impaired  Standing - Static: Good;Constant support(single UE support)  Standing - Dynamic : Fair;Constant support  Pain Rating:  Pt denied c/o pain    Activity Tolerance:   Poor and signs and symptoms of orthostatic hypotension  Please refer to the flowsheet for vital signs taken during this treatment.   Patient Vitals for the past 4 hrs:   Temp Pulse Resp BP SpO2   02/09/20 1518 97.8 °F (36.6 °C) 80 16 117/49 96 %   02/09/20 1345    119/64    02/09/20 1343    97/58    02/09/20 1340    111/63    02/09/20 1338    129/57    02/09/20 1332  74  121/62          After treatment patient left in no apparent distress:   Sitting in chair, Heels elevated for pressure relief, Call bell within reach, and Caregiver / family present    COMMUNICATION/COLLABORATION:   The patients plan of care was discussed with: Registered Nurse    Kristie Lopez, PT   Time Calculation: 19 mins

## 2020-02-09 NOTE — PROGRESS NOTES
[facility] Progress Note    Admit Date: 2020  POD:  4    Procedure:  Procedure(s):  REPAIR TYPE A  AORTIC DISSECTION/ RIGHT FEMORAL CUTDOWN. SLOAN BY DR. Gay Bolanos. Subjective:   Patient seen with Dr. Yolande Mooney. Looks great, sitting up in chair. Prefers to go home tomorrow due to family issues. Objective:   Vitals:  Blood pressure 123/61, pulse 83, temperature 98.6 °F (37 °C), resp. rate 16, height 5' 4\" (1.626 m), weight 153 lb (69.4 kg), SpO2 98 %. Temp (24hrs), Av.2 °F (36.8 °C), Min:97.8 °F (36.6 °C), Max:98.6 °F (37 °C)    EKG/Rhythm:  SR in the 80s    Oxygen Therapy:  Oxygen Therapy  O2 Sat (%): 98 % (20 0800)  Pulse via Oximetry: 82 beats per minute (20 0900)  O2 Device: Room air (20 0800)  O2 Flow Rate (L/min): 2 l/min (20 0807)  FIO2 (%): 50 % (20 1141)    CXR:   CXR Results  (Last 48 hours)               20 0417  XR CHEST PORT Final result    Impression:  IMPRESSION: Stable pleural effusions and bibasilar opacities. Narrative:  EXAM:  CR chest portable       INDICATION: Pleural effusions and bibasilar opacities. COMPARISON: 2020 at 0411 hours. TECHNIQUE: Portable AP semiupright chest view at 0333 hours       FINDINGS: The support devices are stable. The cardiomediastinal contours are   stable. There are stable pleural effusions and bibasilar opacities. There is no   pneumothorax. The bones and upper abdomen are stable.                  Admission Weight: Last Weight   Weight: 149 lb 14.6 oz (68 kg) Weight: 153 lb (69.4 kg)     Intake / Output / Drain:  Current Shift:  07 -  1900  In: 240 [P.O.:240]  Out: -   Last 24 hrs.:     Intake/Output Summary (Last 24 hours) at 2020 1037  Last data filed at 2020 0800  Gross per 24 hour   Intake 600 ml   Output 900 ml   Net -300 ml       EXAM:  General:  Flat affect, no distress                                                                                            Lungs: Diminished bases bilat. Incision:  CDI   Heart:  Regular rate and rhythm, S1, S2 normal, no murmur, click, rub or gallop. Abdomen:   Soft, non-tender. Bowel sounds hypoactive. No masses,  No organomegaly. Extremities:  Mild edema. Palpable pulses bilat    Neurologic:  Gross motor and sensory apparatus intact      Labs:   Recent Labs     20  0705 20  0435   WBC  --  13.6*   HGB  --  8.1*   HCT  --  25.3*   PLT  --  141*   NA  --  138   K  --  5.0   BUN  --  16   CREA  --  0.74   GLU  --  113*   GLUCPOC 123*  --         Assessment:     Principal Problem:    S/P aortic dissection repair (2020)      Overview: Type A Aortic Dissection Repair, 24mm Hemashield Graft      Right Femoral Cutdown for Peripheral Cannulation      DHCA, Antegrade Cerebral Perfusion    Active Problems: Aortic dissection (HCC) (2020)      Postoperative anemia due to acute blood loss (2020)       Plan/Recommendations/Medical Decision Makin. S/p emergent Type A dissection: BP control, SBP < 140. Increase BB. PRN Hydralazine if needed. 2. Small L pleural effusion/atelectasis: IS and TCDB. Increase activity. Wean oxygen to keep sat >92%. Gentle diuresis. PA/Lat today    3. Anemia: expected, postop. Monitor H&H. Improving. Cont Fe/Vit C    4. HTN: increase BB, norvasc    5. HLD: Continue home pravastatin    6. Hx of MR: ? Listed in chart, assessed on intra op SLOAN and no MR seen per verbal report    7. Thrombocytopenia: resolving. Monitor. 8. DVT/GI: SCD's, protonix    9. Leukocytosis: Afebrile. Increase pulm toileting. Culture if temp > 101F. Improving    Dispo: PT/OT/Cardiac Rehab. Home tomorrow. UPDATE 1015: patient had an episode of hypotension following a bowel movement. Quickly recovered with laying flat. Likely vagal episode with bearing down. Monitor for now and trend BPs throughout the day and reeval for changes needed for tonight.  If recurs, will check echo to r/o pericardial effusion    Signed By: GAIL Fortune

## 2020-02-09 NOTE — PROGRESS NOTES
Bedside and Verbal shift change report given to Quinton (oncoming nurse) by LOBITO (offgoing nurse). Report included the following information SBAR, Kardex, Procedure Summary, Intake/Output, MAR, Recent Results, and Cardiac Rhythm NSR .      1000: After having BM, pt experiencing dizziness. Pt in recliner. BP: 73/36. Pt put in bed. Supine BP: 123/61. GAIL Byrd made aware. Will continue to monitor. 1445: MD Rachelle Mattson called to check in on pt progress. Made him aware of pts hypotensive event post BM. Per verbal orders, will hold Norvasc and Lopressor this evening and 2/10 in AM.      Bedside and Verbal shift change report given to LOBITO (oncoming nurse) by Quinton (offgoing nurse). Report included the following information SBAR, Kardex, Procedure Summary, Intake/Output, MAR, Recent Results and Cardiac Rhythm NSR. Problem: Pressure Injury - Risk of  Goal: *Prevention of pressure injury  Description  Document Richard Scale and appropriate interventions in the flowsheet. Outcome: Progressing Towards Goal  Note: Pressure Injury Interventions:  Sensory Interventions: Assess changes in LOC, Keep linens dry and wrinkle-free, Minimize linen layers, Turn and reposition approx. every two hours (pillows and wedges if needed)    Moisture Interventions: Minimize layers    Activity Interventions: Increase time out of bed, Pressure redistribution bed/mattress(bed type), PT/OT evaluation    Mobility Interventions: Float heels, Chair cushion, HOB 30 degrees or less, Pressure redistribution bed/mattress (bed type), PT/OT evaluation    Nutrition Interventions: Document food/fluid/supplement intake    Friction and Shear Interventions: HOB 30 degrees or less, Minimize layers                Problem: Falls - Risk of  Goal: *Absence of Falls  Description  Document Carlo Fall Risk and appropriate interventions in the flowsheet.   Outcome: Progressing Towards Goal  Note: Fall Risk Interventions:  Mobility Interventions: Communicate number of staff needed for ambulation/transfer, OT consult for ADLs, Patient to call before getting OOB, PT Consult for mobility concerns, PT Consult for assist device competence    Mentation Interventions: Evaluate medications/consider consulting pharmacy    Medication Interventions: Patient to call before getting OOB, Evaluate medications/consider consulting pharmacy, Teach patient to arise slowly    Elimination Interventions: Call light in reach, Patient to call for help with toileting needs, Toileting schedule/hourly rounds              Problem:  Aortic Aneurysm Repair: Post Op Day 4  Goal: Activity/Safety  Outcome: Progressing Towards Goal  Goal: Nutrition/Diet  Outcome: Progressing Towards Goal  Goal: Discharge Planning  Outcome: Progressing Towards Goal  Goal: Medications  Outcome: Progressing Towards Goal  Goal: Respiratory  Outcome: Progressing Towards Goal  Goal: Treatments/Interventions/Procedures  Outcome: Progressing Towards Goal  Goal: Psychosocial  Outcome: Progressing Towards Goal  Goal: *Hemodynamically stable  Outcome: Progressing Towards Goal  Goal: *Lungs clear or at baseline  Outcome: Progressing Towards Goal  Goal: *Optimal pain control at patient's stated goal  Outcome: Progressing Towards Goal  Goal: *Incisions without signs and symptoms of wound complication  Outcome: Progressing Towards Goal  Goal: *Demonstrates progressive activity  Outcome: Progressing Towards Goal  Goal: *Tolerating diet  Outcome: Progressing Towards Goal  Goal: *Absence of infection signs and symptoms  Outcome: Progressing Towards Goal  Goal: *Afebrile  Outcome: Progressing Towards Goal  Goal: *Absence of signs and symptoms of DVT  Outcome: Progressing Towards Goal

## 2020-02-09 NOTE — PROGRESS NOTES
Spiritual Care Partner Volunteer visited patient in Rm [id number] on 2/9/20. Documented by:   Chaplain Randhawa MDiv, MACE  [phone] PRAY [phone])

## 2020-02-10 ENCOUNTER — APPOINTMENT (OUTPATIENT)
Dept: NON INVASIVE DIAGNOSTICS | Age: 75
DRG: 220 | End: 2020-02-10
Attending: NURSE PRACTITIONER
Payer: MEDICARE

## 2020-02-10 LAB
ANION GAP SERPL CALC-SCNC: 6 MMOL/L (ref 5–15)
BUN SERPL-MCNC: 16 MG/DL (ref 6–20)
BUN/CREAT SERPL: 19 (ref 12–20)
CALCIUM SERPL-MCNC: 8.3 MG/DL (ref 8.5–10.1)
CHLORIDE SERPL-SCNC: 105 MMOL/L (ref 97–108)
CO2 SERPL-SCNC: 26 MMOL/L (ref 21–32)
CREAT SERPL-MCNC: 0.85 MG/DL (ref 0.55–1.02)
ERYTHROCYTE [DISTWIDTH] IN BLOOD BY AUTOMATED COUNT: 13.8 % (ref 11.5–14.5)
GLUCOSE SERPL-MCNC: 106 MG/DL (ref 65–100)
HCT VFR BLD AUTO: 28.4 % (ref 35–47)
HGB BLD-MCNC: 9 G/DL (ref 11.5–16)
MAGNESIUM SERPL-MCNC: 2.5 MG/DL (ref 1.6–2.4)
MCH RBC QN AUTO: 30.7 PG (ref 26–34)
MCHC RBC AUTO-ENTMCNC: 31.7 G/DL (ref 30–36.5)
MCV RBC AUTO: 96.9 FL (ref 80–99)
NRBC # BLD: 0 K/UL (ref 0–0.01)
NRBC BLD-RTO: 0 PER 100 WBC
PLATELET # BLD AUTO: 184 K/UL (ref 150–400)
PMV BLD AUTO: 11.5 FL (ref 8.9–12.9)
POTASSIUM SERPL-SCNC: 3.4 MMOL/L (ref 3.5–5.1)
POTASSIUM SERPL-SCNC: 5.6 MMOL/L (ref 3.5–5.1)
RBC # BLD AUTO: 2.93 M/UL (ref 3.8–5.2)
SODIUM SERPL-SCNC: 137 MMOL/L (ref 136–145)
WBC # BLD AUTO: 10.3 K/UL (ref 3.6–11)

## 2020-02-10 PROCEDURE — 94760 N-INVAS EAR/PLS OXIMETRY 1: CPT

## 2020-02-10 PROCEDURE — 83735 ASSAY OF MAGNESIUM: CPT

## 2020-02-10 PROCEDURE — 36415 COLL VENOUS BLD VENIPUNCTURE: CPT

## 2020-02-10 PROCEDURE — P9045 ALBUMIN (HUMAN), 5%, 250 ML: HCPCS | Performed by: NURSE PRACTITIONER

## 2020-02-10 PROCEDURE — 97530 THERAPEUTIC ACTIVITIES: CPT

## 2020-02-10 PROCEDURE — 97110 THERAPEUTIC EXERCISES: CPT

## 2020-02-10 PROCEDURE — 74011250637 HC RX REV CODE- 250/637: Performed by: NURSE PRACTITIONER

## 2020-02-10 PROCEDURE — 85027 COMPLETE CBC AUTOMATED: CPT

## 2020-02-10 PROCEDURE — 80048 BASIC METABOLIC PNL TOTAL CA: CPT

## 2020-02-10 PROCEDURE — 93308 TTE F-UP OR LMTD: CPT

## 2020-02-10 PROCEDURE — 84132 ASSAY OF SERUM POTASSIUM: CPT

## 2020-02-10 PROCEDURE — 65660000001 HC RM ICU INTERMED STEPDOWN

## 2020-02-10 PROCEDURE — 74011250636 HC RX REV CODE- 250/636: Performed by: NURSE PRACTITIONER

## 2020-02-10 PROCEDURE — 74011250637 HC RX REV CODE- 250/637: Performed by: PHYSICIAN ASSISTANT

## 2020-02-10 PROCEDURE — 97535 SELF CARE MNGMENT TRAINING: CPT

## 2020-02-10 RX ORDER — METOPROLOL TARTRATE 25 MG/1
25 TABLET, FILM COATED ORAL EVERY 12 HOURS
Status: DISCONTINUED | OUTPATIENT
Start: 2020-02-10 | End: 2020-02-10

## 2020-02-10 RX ORDER — ALBUMIN HUMAN 50 G/1000ML
12.5 SOLUTION INTRAVENOUS ONCE
Status: COMPLETED | OUTPATIENT
Start: 2020-02-10 | End: 2020-02-10

## 2020-02-10 RX ADMIN — MAGNESIUM OXIDE TAB 400 MG (241.3 MG ELEMENTAL MG) 400 MG: 400 (241.3 MG) TAB at 18:09

## 2020-02-10 RX ADMIN — SENNOSIDES AND DOCUSATE SODIUM 1 TABLET: 8.6; 5 TABLET ORAL at 08:28

## 2020-02-10 RX ADMIN — PRAVASTATIN SODIUM 20 MG: 20 TABLET ORAL at 08:28

## 2020-02-10 RX ADMIN — SENNOSIDES AND DOCUSATE SODIUM 1 TABLET: 8.6; 5 TABLET ORAL at 18:09

## 2020-02-10 RX ADMIN — ASPIRIN 81 MG 81 MG: 81 TABLET ORAL at 08:28

## 2020-02-10 RX ADMIN — AMIODARONE HYDROCHLORIDE 400 MG: 200 TABLET ORAL at 08:28

## 2020-02-10 RX ADMIN — CHLORHEXIDINE GLUCONATE 10 ML: 1.2 RINSE ORAL at 21:34

## 2020-02-10 RX ADMIN — POLYETHYLENE GLYCOL 3350 17 G: 17 POWDER, FOR SOLUTION ORAL at 08:27

## 2020-02-10 RX ADMIN — Medication 10 ML: at 21:33

## 2020-02-10 RX ADMIN — OXYCODONE HYDROCHLORIDE AND ACETAMINOPHEN 1000 MG: 500 TABLET ORAL at 08:28

## 2020-02-10 RX ADMIN — FERROUS SULFATE TAB 325 MG (65 MG ELEMENTAL FE) 325 MG: 325 (65 FE) TAB at 08:28

## 2020-02-10 RX ADMIN — AMIODARONE HYDROCHLORIDE 400 MG: 200 TABLET ORAL at 21:33

## 2020-02-10 RX ADMIN — MAGNESIUM OXIDE TAB 400 MG (241.3 MG ELEMENTAL MG) 400 MG: 400 (241.3 MG) TAB at 08:28

## 2020-02-10 RX ADMIN — Medication 10 ML: at 13:53

## 2020-02-10 RX ADMIN — ALBUMIN (HUMAN) 12.5 G: 12.5 INJECTION, SOLUTION INTRAVENOUS at 11:04

## 2020-02-10 RX ADMIN — Medication 10 ML: at 06:50

## 2020-02-10 RX ADMIN — PANTOPRAZOLE SODIUM 40 MG: 40 TABLET, DELAYED RELEASE ORAL at 06:50

## 2020-02-10 RX ADMIN — CHLORHEXIDINE GLUCONATE 10 ML: 1.2 RINSE ORAL at 08:32

## 2020-02-10 NOTE — PROGRESS NOTES
Problem: Self Care Deficits Care Plan (Adult)  Goal: *Acute Goals and Plan of Care (Insert Text)  Description  FUNCTIONAL STATUS PRIOR TO ADMISSION: Patient was independent and active without use of DME.     HOME SUPPORT: The patient lived with  but did not need assistance.  and pt split household tasks,  cooked. Both are retired. Occupational Therapy Goals  Initiated 2/6/2020  1. Patient will perform ADLs standing 5 mins without fatigue or LOB with supervision/set-up within 7 day(s). 2.  Patient will perform lower body ADLs with supervision/set-up within 7 day(s). 3.  Patient will perform gathering ADL items high and low 2/2 with supervision/set-up within 7 day(s). 4.  Patient will perform toilet transfers with supervision/set-up within 7 day(s). 5.  Patient will perform all aspects of toileting with supervision/set-up within 7 day(s). 6.  Patient will participate in cardiac/sternal upper extremity therapeutic exercise/activities to increase independence with ADLs with supervision/set-up for 5 minutes within 7 day(s). Outcome: Progressing Towards Goal   OCCUPATIONAL THERAPY TREATMENT  Patient: Mariam Wiggins (89 y.o. female)  Date: 2/10/2020  Diagnosis: Aortic dissection ([facility] [address] [I71.00]   S/P aortic dissection repair  Procedure(s) (LRB):  REPAIR TYPE A  AORTIC DISSECTION, DEEP HYPOTHERMIC CIRCULATORY ARREST, RIGHT FEMORAL CUTDOWN, ECC, SLOAN AND EPIAORTIC U/S BY DR TOLEDO SLAON BY DR. Nakita Lewis. (N/A) 5 Days Post-Op  Precautions: Fall, Sternal(SBP <140)  Chart, occupational therapy assessment, plan of care, and goals were reviewed. ASSESSMENT  Patient continues with skilled OT services and is progressing towards goals. Pt with much brighter affect this date compared to last OT session. She continues to require intermittent verbal cues on mindful movements during scooting/re-positioning in the recliner.  Pt stood with SBA and additional time, /66 and pt without c/o expect for feeing \"tired. \" She completed 2/6 cardiac exercises standing prior to needing to sit due to L lower back pain. Pt completed remaining exercises seated, donned underwear with supervision. Currently pt is most limited by her BP (see PT note) as well as decreased activity tolerance/endurance. Reviewed energy conservation with pt indicating understanding. Patient is not demonstrating understanding of mindful-based movements (\"move in the tube\") principles of keeping UEs proximal to ribcage to prevent lateral pull on the sternum during load-bearing activities with visual and verbal cues required for compliance. Current Level of Function Impacting Discharge (ADLs): SBA transfers, decreased activity tolerance with standing tolerance of only 2 minutes, limited also by lower back L pain present prior to sx    Other factors to consider for discharge: lives with , will have family to assist         PLAN :  Patient continues to benefit from skilled intervention to address the above impairments. Continue treatment per established plan of care. to address goals. Recommend with staff: OOB to recliner and bathroom as BP allows    Recommend next OT session: UB dressing, energy conservation, standing endurance during ADLs/cardiac exercises     Recommendation for discharge: (in order for the patient to meet his/her long term goals)  Occupational therapy at least 2 days/week in the home AND ensure assist and/or supervision for safety with IADLs and ADLs     This discharge recommendation:  Has been made in collaboration with the attending provider and/or case management    IF patient discharges home will need the following DME: none-        SUBJECTIVE:   Patient stated my back hurt like this before this sx.     OBJECTIVE DATA SUMMARY:   Cognitive/Behavioral Status:  Neurologic State: Alert  Orientation Level: Oriented X4  Cognition: Appropriate decision making; Appropriate for age attention/concentration; Appropriate safety awareness; Follows commands  Perception: Appears intact  Perseveration: No perseveration noted  Safety/Judgement: Awareness of environment    Functional Mobility and Transfers for ADLs:  Bed Mobility:       Transfers:  Sit to Stand: Stand-by assistance(cues for mindful movements)          Balance:  Sitting: Intact; Without support  Standing: Impaired; Without support  Standing - Static: Good    ADL Intervention:     Pt has shower chair and encouraged pt to use for energy conservation. Lower Body Dressing Assistance  Underpants: Supervision         Cognitive Retraining  Safety/Judgement: Awareness of environment    Patient instructed and educated on mindful movement principles based on Move in The Tube concept to include maintaining bilateral elbows close to rib cage when performing any load-bearing activity such as getting in/out of bed, pushing up from a chair, opening a door, or lifting a box. Patient was given a handout with diagrams of each correct/incorrect method of performing each of the above tasks. Patient instructed and encouraged to mobilize bilateral upper extremities outside the tube when doing any non-load bearing activity such as washing hair/body, brushing teeth, retrieving clothing items, or scratching your back. Patient instructed no asymmetrical reaching over head to ensure B UEs when shoulders >90* i.e. reaching in cabinets and dressing. Instruction on upper body dressing techniques of over head, then arms through to decrease pain and unilateral shoulder flexion >90*. Instruction on the benefits of utilizing B UEs during functional tasks i.e. opening the fridge, stepping into the tub. May have to adjust home setup to increase ease with items closer to waist height to prevent deep bending and the automatic  of asymmetrical UE WB/pushing for stabilization during bending.   Benefit to don clothing tailor sitting and don all clothing while sitting prior to standing. Patient demonstrated lower body dressing seated in chair with Supervision. Instruction and indicated understanding on the benefits of loose clothing throughout to accommodate for post surgical swelling, decreased ROM and increased pain. Instruction and indicated understanding the technique of pull over shirt versus front open clothing. Increase activity tolerance for home, work, and sexual intercourse by pacing self with increasing the arm exercises, sitting duration, frequency OOB, walking, standing, and ADLs. Instructed and indicated understanding of s/s of too much activity, how to respond to s/s safely. Therapeutic Exercises:   Patient instructed on the benefits and demonstrated cardiac exercises while sitting and standing with Supervision. Instructed and indicated understanding on how to progress reps, sets against gravity, pacing through progressive muscle strengthening standing based on surgeon clearance for more weight in prep for basic and instrumental ADLs. Instruction on the use of household items in place of weights as needed.     CARDIAC   EXERCISE    Sets    Reps    Active  Active Assist    Passive  Self ROM    Comments    Shoulder flexion  1  5   [x]                            []                             []                             []                                Shoulder abduction  1  5  [x]                             []                             []                             []                                Scapular elevation  1  5  [x]                             []                              []                             []                                Scapular retraction  1  5  [x]                             []                             []                             []                                Trunk rotation  1  5  [x]                             []                             []                             []                                Trunk sidebending  1  5  [x]                             []                              []                             []                                          Pain:  Low back L pain    Activity Tolerance:   Fair  Please refer to the flowsheet for vital signs taken during this treatment.     After treatment patient left in no apparent distress:   Sitting in chair and Call bell within reach    COMMUNICATION/COLLABORATION:   The patients plan of care was discussed with: Physical Therapy Assistant and Registered Nurse    Radha Tao OT  Time Calculation: 27 mins

## 2020-02-10 NOTE — PROGRESS NOTES
Physical Therapy      S: pt sitting in chair with c/o fatigue    O: Sit to stand CGA. V.c for arm placement to adhere to sternal precautions. Pt reporting dizzy and fatigue. Noted BP standing 72/48 . Attempted marching. Pt did not recover with eyes closing and postural sway. Returned pt to reclined position. Notified RN    A: Pt limited by decrease BP with standing. Pt symptomatic.  See flowsheet for vitals    P: will continue to progress with plans to discharge home with family support and HHPT    TRACEE Salmeron

## 2020-02-10 NOTE — PROGRESS NOTES
0740 Bedside and Verbal shift change report given to DOUGLAS Ricci (oncoming nurse) by Germán Banks (offgoing nurse). Report included the following information SBAR, Kardex, Intake/Output, MAR, Recent Results, Med Rec Status and Cardiac Rhythm NSR/ST.     0945 Pt BP dropped 70s/40s during PT. Symptomatic. Pt returned to recliner. 111 [facility], NP, informed. No new orders. 1 [facility] Sent to lab.     1605 Echo team paged. 1615 Echo tech called stated it was his day off.     1620 Paged for Echo team.    1935 Bedside and Verbal shift change report given to JIMY Mahoney (oncoming nurse) by Judith Thomas (offgoing nurse). Report included the following information SBAR, Kardex, Intake/Output, MAR, Recent Results and Med Rec Status.

## 2020-02-10 NOTE — PROGRESS NOTES
Problem: Pressure Injury - Risk of  Goal: *Prevention of pressure injury  Description  Document Richard Scale and appropriate interventions in the flowsheet. Outcome: Progressing Towards Goal  Note: Pressure Injury Interventions:  Sensory Interventions: Assess changes in LOC, Keep linens dry and wrinkle-free, Minimize linen layers, Turn and reposition approx. every two hours (pillows and wedges if needed)    Moisture Interventions: Minimize layers    Activity Interventions: Chair cushion, Increase time out of bed, Pressure redistribution bed/mattress(bed type), PT/OT evaluation    Mobility Interventions: Chair cushion, HOB 30 degrees or less, Pressure redistribution bed/mattress (bed type), PT/OT evaluation    Nutrition Interventions: Document food/fluid/supplement intake    Friction and Shear Interventions: HOB 30 degrees or less, Minimize layers                Problem: Patient Education: Go to Patient Education Activity  Goal: Patient/Family Education  Outcome: Progressing Towards Goal     Problem: Falls - Risk of  Goal: *Absence of Falls  Description  Document Carlo Fall Risk and appropriate interventions in the flowsheet.   Outcome: Progressing Towards Goal  Note: Fall Risk Interventions:  Mobility Interventions: Communicate number of staff needed for ambulation/transfer, Patient to call before getting OOB, PT Consult for mobility concerns, Strengthening exercises (ROM-active/passive), Utilize gait belt for transfers/ambulation    Mentation Interventions: Adequate sleep, hydration, pain control    Medication Interventions: Evaluate medications/consider consulting pharmacy, Patient to call before getting OOB, Teach patient to arise slowly, Utilize gait belt for transfers/ambulation    Elimination Interventions: Call light in reach, Patient to call for help with toileting needs, Stay With Me (per policy), Toilet paper/wipes in reach, Toileting schedule/hourly rounds              Problem: Patient Education: Go to Patient Education Activity  Goal: Patient/Family Education  Outcome: Progressing Towards Goal     Problem:  Aortic Aneurysm Repair: Post op Day 6  Goal: Activity/Safety  Outcome: Progressing Towards Goal  Goal: Diagnostic Test/Procedures  Outcome: Progressing Towards Goal  Goal: Medications  Outcome: Progressing Towards Goal  Goal: Respiratory  Outcome: Progressing Towards Goal  Goal: Psychosocial  Outcome: Progressing Towards Goal

## 2020-02-10 NOTE — PROGRESS NOTES
2330 Bedside shift change report given to [facility], RN (oncoming nurse) by Vicente Stanley RN (offgoing nurse). Report included the following information SBAR, Kardex, Intake/Output, MAR, Accordion, Recent Results and Cardiac Rhythm NSR.    0415 pt bp 151/69. .. Recheck 139/73    0630 patient bathed, and up in chair     0700   Cardiac Surgery Shift Summary:    1. I/O's: Intake:   Meals: 25-50% of each meal (fair)     Output: WDL (voiding without difficulty)   Has patient had BM since surgery? Yes    2. Oxygen Requirements: WDL (on room air)    3. Daily Weights (weight change in 24 hours): No significant change in weight (0 - 2 lbs.)    4. Medication Administration: No variations to medication administration   Medications auto-held. 0800 Bedside shift change report given to [address] [place] [address] [address] (oncoming nurse) by [facility], RN (offgoing nurse). Report included the following information SBAR, Kardex, Intake/Output, MAR and Recent Results. Problem: Pressure Injury - Risk of  Goal: *Prevention of pressure injury  Description  Document Richard Scale and appropriate interventions in the flowsheet. Outcome: Progressing Towards Goal  Note: Pressure Injury Interventions:  Sensory Interventions: Assess changes in LOC, Keep linens dry and wrinkle-free, Minimize linen layers, Turn and reposition approx. every two hours (pillows and wedges if needed)    Moisture Interventions: Minimize layers    Activity Interventions: PT/OT evaluation    Mobility Interventions: PT/OT evaluation    Nutrition Interventions: Document food/fluid/supplement intake    Friction and Shear Interventions: HOB 30 degrees or less, Minimize layers                Problem: Falls - Risk of  Goal: *Absence of Falls  Description  Document Carlo Fall Risk and appropriate interventions in the flowsheet.   Outcome: Progressing Towards Goal  Note: Fall Risk Interventions:  Mobility Interventions: PT Consult for mobility concerns    Mentation Interventions: Evaluate medications/consider consulting pharmacy    Medication Interventions: Patient to call before getting OOB    Elimination Interventions: Call light in reach              Problem:  Aortic Aneurysm Repair: Post Op Day 5  Goal: Activity/Safety  Outcome: Progressing Towards Goal  Goal: Nutrition/Diet  Outcome: Progressing Towards Goal  Goal: Medications  Outcome: Progressing Towards Goal  Goal: Respiratory  Outcome: Progressing Towards Goal  Goal: Treatments/Interventions/Procedures  Outcome: Progressing Towards Goal  Goal: Psychosocial  Outcome: Progressing Towards Goal  Goal: *Hemodynamically stable  Outcome: Progressing Towards Goal  Goal: *Lungs clear or at baseline  Outcome: Progressing Towards Goal  Goal: *Optimal pain control at patient's stated goal  Outcome: Progressing Towards Goal  Goal: *Incisions without signs and symptoms of wound complication  Outcome: Progressing Towards Goal  Goal: *Absence of infection signs and symptoms  Outcome: Progressing Towards Goal  Goal: *Afebrile  Outcome: Progressing Towards Goal     Problem: Hypertension  Goal: *Blood pressure within specified parameters  Outcome: Progressing Towards Goal

## 2020-02-10 NOTE — PROGRESS NOTES
Transitions of Care:     -Patient will discharge home with family support and [id number] [facility] and PT   -Family to transport   -Cardiac Surgery follow-up    CM spoke with Cardiac Surgery NP- patient not medically stable for discharge today, monitoring BP. The CM called [facility]. in intake with [facility] and provided update- they are requesting MD to sign HH orders- orders on chart, CM also informed them to contact Cardiac Surgery office directly. The patient has family support- daughter Mireya Rahman is visiting from [facility], plans to stay a few weeks per previous conversations. The CM will follow for transitions of care.  Beka Villar, MSW

## 2020-02-10 NOTE — PROGRESS NOTES
[facility] Progress Note    Admit Date: 2020  POD:  5    Procedure:  Procedure(s):  REPAIR TYPE A  AORTIC DISSECTION/ RIGHT FEMORAL CUTDOWN. SLOAN BY DR. Jackelin Hare. Subjective:   Patient seen with Dr. Karen Matias. Vagal episode w/ BM over weekend, and again this morning w/ PT. Afebrile, RA. Objective:   Vitals:  Blood pressure 143/83, pulse (!) 101, temperature 98.6 °F (37 °C), resp. rate 18, height 5' 4\" (1.626 m), weight 150 lb 5.7 oz (68.2 kg), SpO2 98 %. Temp (24hrs), Av.8 °F (36.6 °C), Min:97.5 °F (36.4 °C), Max:98.6 °F (37 °C)    EKG/Rhythm: SR/ST     Oxygen Therapy:  Oxygen Therapy  O2 Sat (%): 98 % (02/10/20 0743)  Pulse via Oximetry: 82 beats per minute (20 0900)  O2 Device: Room air (02/10/20 0822)  O2 Flow Rate (L/min): 2 l/min (20 0807)  FIO2 (%): 50 % (20 1141)    CXR:   CXR Results  (Last 48 hours)               20 1545  XR CHEST PA LAT Final result    Impression:  IMPRESSION:       Unchanged small right and decreased small left pleural effusions. No   pneumothorax. Narrative:  EXAM: XR CHEST PA LAT       INDICATION: Aortic dissection, mitral valve pathology, hypertension. COMPARISON: Portable chest on 2020. CT angiography chest on 2020. TECHNIQUE: Upright AP and lateral chest views       FINDINGS: Sternotomy wires are new since 2020. Cardiac monitoring wires   overlie the thorax. Chest tubes have been removed since yesterday. Mild   cardiomegaly is accentuated by technique. Aortic arch is less prominent. The   pulmonary vasculature is within normal limits. Right pleural effusion is small but unchanged. Left pleural effusion is small   and decreased. No pneumothorax. No evidence of pulmonary edema. . Bones are   unchanged. Admission Weight: Last Weight   Weight: 149 lb 14.6 oz (68 kg) Weight: 150 lb 5.7 oz (68.2 kg)     Intake / Output / Drain:  Current Shift: No intake/output data recorded.   Last 24 hrs.: Intake/Output Summary (Last 24 hours) at 2/10/2020 0939  Last data filed at 2/10/2020 0414  Gross per 24 hour   Intake 800 ml   Output    Net 800 ml       EXAM:  General:  Pleasant, no apparent distress                                                                                         Lungs:   Diminished bases bilat. Incision:  No erythema, drainage or tenderness   Heart:  Regular rate and rhythm, S1, S2 normal, no murmur, click, rub or gallop. Abdomen:   Soft, non-tender. Bowel sounds hypoactive. No masses,  No organomegaly. Extremities:  Mild edema. Palpable pulses bilat    Neurologic:  Gross motor and sensory apparatus intact      Labs:   Recent Labs     02/10/20  0412 02/09/20  0705   WBC 10.3  --    HGB 9.0*  --    HCT 28.4*  --      --      --    K 5.6*  --    BUN 16  --    CREA 0.85  --    *  --    GLUCPOC  --  123*        Assessment:     Principal Problem:    S/P aortic dissection repair (2020)      Overview: Type A Aortic Dissection Repair, 24mm Hemashield Graft      Right Femoral Cutdown for Peripheral Cannulation      DHCA, Antegrade Cerebral Perfusion    Active Problems: Aortic dissection (HCC) (2020)      Postoperative anemia due to acute blood loss (2020)       Plan/Recommendations/Medical Decision Makin. S/p emergent Type A dissection: BP control, SBP < 140. Hold meds due to orthostatic hypotension. PRN Hydralazine if needed. 2. Small L pleural effusion/atelectasis: IS and TCDB. Increase activity. On RA. PA/Lat w/ residual small effusions, BP labile, unable to tolerate diuresis     3. Anemia: expected, postop. Monitor H&H. Improving. Cont Fe/Vit C    4. HTN: hold BB pre above    5. HLD: Continue home pravastatin    6. Hx of MR: ? Listed in chart, assessed on intra op SLOAN and no MR seen per verbal report    7. Thrombocytopenia: resolved    8. DVT/GI: SCD's, protonix    9. Leukocytosis: resolved, afebrile    10.  Vagal episode: had orthostatic BP again this morning w/ PT. Hold resuming BP meds. TTE pending. Give 1 bottle albumin     11. Hyperkalemia: likely hemolyzed, repeat labs      Dispo: PT/OT/Cardiac Rehab.  Keep 1 more day to monitor BP response     Signed By: Dashawn Chowdary NP

## 2020-02-11 LAB
ANION GAP SERPL CALC-SCNC: 5 MMOL/L (ref 5–15)
BUN SERPL-MCNC: 15 MG/DL (ref 6–20)
BUN/CREAT SERPL: 19 (ref 12–20)
CALCIUM SERPL-MCNC: 8.4 MG/DL (ref 8.5–10.1)
CHLORIDE SERPL-SCNC: 107 MMOL/L (ref 97–108)
CO2 SERPL-SCNC: 27 MMOL/L (ref 21–32)
CREAT SERPL-MCNC: 0.81 MG/DL (ref 0.55–1.02)
ECHO AO ROOT DIAM: 2.7 CM
ECHO LA MAJOR AXIS: 3.61 CM
ECHO LA TO AORTIC ROOT RATIO: 1.34
ECHO LV INTERNAL DIMENSION DIASTOLIC: 3.67 CM (ref 3.9–5.3)
ECHO LV INTERNAL DIMENSION SYSTOLIC: 2.42 CM
ECHO LV IVSD: 1.13 CM (ref 0.6–0.9)
ECHO LV MASS 2D: 146.1 G (ref 67–162)
ECHO LV MASS INDEX 2D: 84.4 G/M2 (ref 43–95)
ECHO LV POSTERIOR WALL DIASTOLIC: 1.08 CM (ref 0.6–0.9)
ECHO RV INTERNAL DIMENSION: 2.5 CM
ECHO RV TAPSE: 1.41 CM (ref 1.5–2)
ERYTHROCYTE [DISTWIDTH] IN BLOOD BY AUTOMATED COUNT: 13.7 % (ref 11.5–14.5)
GLUCOSE SERPL-MCNC: 115 MG/DL (ref 65–100)
HCT VFR BLD AUTO: 25.7 % (ref 35–47)
HGB BLD-MCNC: 8.3 G/DL (ref 11.5–16)
LVFS 2D: 33.93 %
MAGNESIUM SERPL-MCNC: 2.4 MG/DL (ref 1.6–2.4)
MCH RBC QN AUTO: 30.7 PG (ref 26–34)
MCHC RBC AUTO-ENTMCNC: 32.3 G/DL (ref 30–36.5)
MCV RBC AUTO: 95.2 FL (ref 80–99)
NRBC # BLD: 0 K/UL (ref 0–0.01)
NRBC BLD-RTO: 0 PER 100 WBC
PLATELET # BLD AUTO: 222 K/UL (ref 150–400)
PMV BLD AUTO: 10.7 FL (ref 8.9–12.9)
POTASSIUM SERPL-SCNC: 3.6 MMOL/L (ref 3.5–5.1)
RBC # BLD AUTO: 2.7 M/UL (ref 3.8–5.2)
SODIUM SERPL-SCNC: 139 MMOL/L (ref 136–145)
WBC # BLD AUTO: 8.1 K/UL (ref 3.6–11)

## 2020-02-11 PROCEDURE — 36415 COLL VENOUS BLD VENIPUNCTURE: CPT

## 2020-02-11 PROCEDURE — 83735 ASSAY OF MAGNESIUM: CPT

## 2020-02-11 PROCEDURE — 97535 SELF CARE MNGMENT TRAINING: CPT

## 2020-02-11 PROCEDURE — 74011250637 HC RX REV CODE- 250/637: Performed by: PHYSICIAN ASSISTANT

## 2020-02-11 PROCEDURE — 74011250637 HC RX REV CODE- 250/637: Performed by: NURSE PRACTITIONER

## 2020-02-11 PROCEDURE — 97530 THERAPEUTIC ACTIVITIES: CPT

## 2020-02-11 PROCEDURE — 85027 COMPLETE CBC AUTOMATED: CPT

## 2020-02-11 PROCEDURE — 65660000001 HC RM ICU INTERMED STEPDOWN

## 2020-02-11 PROCEDURE — 80048 BASIC METABOLIC PNL TOTAL CA: CPT

## 2020-02-11 RX ORDER — METOPROLOL TARTRATE 25 MG/1
25 TABLET, FILM COATED ORAL EVERY 12 HOURS
Status: DISCONTINUED | OUTPATIENT
Start: 2020-02-11 | End: 2020-02-11

## 2020-02-11 RX ORDER — METOPROLOL TARTRATE 25 MG/1
12.5 TABLET, FILM COATED ORAL EVERY 12 HOURS
Status: DISCONTINUED | OUTPATIENT
Start: 2020-02-11 | End: 2020-02-12 | Stop reason: HOSPADM

## 2020-02-11 RX ADMIN — PANTOPRAZOLE SODIUM 40 MG: 40 TABLET, DELAYED RELEASE ORAL at 06:59

## 2020-02-11 RX ADMIN — AMIODARONE HYDROCHLORIDE 400 MG: 200 TABLET ORAL at 08:19

## 2020-02-11 RX ADMIN — OXYCODONE HYDROCHLORIDE AND ACETAMINOPHEN 1000 MG: 500 TABLET ORAL at 08:19

## 2020-02-11 RX ADMIN — SENNOSIDES AND DOCUSATE SODIUM 1 TABLET: 8.6; 5 TABLET ORAL at 08:18

## 2020-02-11 RX ADMIN — MAGNESIUM OXIDE TAB 400 MG (241.3 MG ELEMENTAL MG) 400 MG: 400 (241.3 MG) TAB at 17:48

## 2020-02-11 RX ADMIN — PRAVASTATIN SODIUM 20 MG: 20 TABLET ORAL at 08:19

## 2020-02-11 RX ADMIN — Medication 10 ML: at 07:00

## 2020-02-11 RX ADMIN — MAGNESIUM OXIDE TAB 400 MG (241.3 MG ELEMENTAL MG) 400 MG: 400 (241.3 MG) TAB at 08:19

## 2020-02-11 RX ADMIN — ASPIRIN 81 MG 81 MG: 81 TABLET ORAL at 08:19

## 2020-02-11 RX ADMIN — AMIODARONE HYDROCHLORIDE 400 MG: 200 TABLET ORAL at 21:02

## 2020-02-11 RX ADMIN — METOPROLOL TARTRATE 12.5 MG: 25 TABLET ORAL at 21:03

## 2020-02-11 RX ADMIN — CHLORHEXIDINE GLUCONATE 10 ML: 1.2 RINSE ORAL at 08:19

## 2020-02-11 RX ADMIN — POLYETHYLENE GLYCOL 3350 17 G: 17 POWDER, FOR SOLUTION ORAL at 08:18

## 2020-02-11 RX ADMIN — FERROUS SULFATE TAB 325 MG (65 MG ELEMENTAL FE) 325 MG: 325 (65 FE) TAB at 08:19

## 2020-02-11 RX ADMIN — SENNOSIDES AND DOCUSATE SODIUM 1 TABLET: 8.6; 5 TABLET ORAL at 17:48

## 2020-02-11 RX ADMIN — Medication 10 ML: at 21:03

## 2020-02-11 RX ADMIN — CHLORHEXIDINE GLUCONATE 10 ML: 1.2 RINSE ORAL at 21:03

## 2020-02-11 RX ADMIN — Medication 10 ML: at 15:04

## 2020-02-11 NOTE — PROGRESS NOTES
1930: Bedside and Verbal shift change report given to Blank Olea RN (oncoming nurse) by Fiona Hunter RN (offgoing nurse). Report included the following information SBAR, Kardex, Intake/Output, MAR, Recent Results and Cardiac Rhythm NSR/ST.   0730: Bedside and Verbal shift change report given to Fiona Hunter RN (oncoming nurse) by Blank Olea RN (offgoing nurse). Report included the following information SBAR, Kardex, Intake/Output, MAR, Recent Results and Cardiac Rhythm NSR/ST.

## 2020-02-11 NOTE — PROGRESS NOTES
7427 Bedside and Verbal shift change report given to DOUGLAS Noyola (oncoming nurse) by Kylah Cervantes (offgoing nurse). Report included the following information SBAR, Kardex, Intake/Output, MAR, Recent Results and Med Rec Status. Via Alberto Kelly, NP informed of pt increased tachycardia. VORB to encourage po fluids. 1110 Bedside and Verbal shift change report given to Fareed Sr (oncoming nurse) by Corrie Cowan (offgoing nurse).  Report included the following information SBAR, Kardex, ED Summary, Intake/Output, MAR, Recent Results, Med Rec Status and Cardiac Rhythm ST.

## 2020-02-11 NOTE — PROGRESS NOTES
Transitions of Care:     -Discharge home with family support and home health skilled nursing and PT with [facility]    -Cardiac Surgery follow-up   -Family to transport    The CM spoke with Cardiac Surgery NP- monitoring HR and BP management, will discharge home until BP/HR improved. The patient has been approved for home health with [facility]. The patient's family will transport home. CM will follow for transitions of care. KERVIN Ocampo CM faxed signed MD orders to [facility].

## 2020-02-11 NOTE — PROGRESS NOTES
CSS Progress Note    Admit Date: 2020  POD:  6    Procedure:  Procedure(s):  REPAIR TYPE A  AORTIC DISSECTION/ RIGHT FEMORAL CUTDOWN. SLOAN BY DR. Marquise Story. Subjective:   Patient seen with Dr. Shamika Davis. Pt in chair, did well with going from bed to chair this morning. HR increasing. Afebrile on RA. +BM 2/10      Objective:   Vitals:  Blood pressure 137/73, pulse (!) 113, temperature 98.2 °F (36.8 °C), resp. rate 16, height 5' 4\" (1.626 m), weight 152 lb 1.9 oz (69 kg), SpO2 95 %. Temp (24hrs), Av.2 °F (36.8 °C), Min:97.9 °F (36.6 °C), Max:98.5 °F (36.9 °C)    EKG/Rhythm: ST    Oxygen Therapy:  Oxygen Therapy  O2 Sat (%): 95 % (20 0741)  Pulse via Oximetry: 82 beats per minute (20 0900)  O2 Device: Room air (20 0342)  O2 Flow Rate (L/min): 2 l/min (20 0807)  FIO2 (%): 50 % (20 1141)    CXR:   CXR Results  (Last 48 hours)               20 1545  XR CHEST PA LAT Final result    Impression:  IMPRESSION:       Unchanged small right and decreased small left pleural effusions. No   pneumothorax. Narrative:  EXAM: XR CHEST PA LAT       INDICATION: Aortic dissection, mitral valve pathology, hypertension. COMPARISON: Portable chest on 2020. CT angiography chest on 2020. TECHNIQUE: Upright AP and lateral chest views       FINDINGS: Sternotomy wires are new since 2020. Cardiac monitoring wires   overlie the thorax. Chest tubes have been removed since yesterday. Mild   cardiomegaly is accentuated by technique. Aortic arch is less prominent. The   pulmonary vasculature is within normal limits. Right pleural effusion is small but unchanged. Left pleural effusion is small   and decreased. No pneumothorax. No evidence of pulmonary edema. . Bones are   unchanged.                  Admission Weight: Last Weight   Weight: 149 lb 14.6 oz (68 kg) Weight: 152 lb 1.9 oz (69 kg)     Intake / Output / Drain:  Current Shift: No intake/output data recorded. Last 24 hrs.:     Intake/Output Summary (Last 24 hours) at 2020 0750  Last data filed at 2020 0342  Gross per 24 hour   Intake 1110 ml   Output 0 ml   Net 1110 ml       EXAM:  General:  Pleasant, no apparent distress                                                                                         Lungs:   Diminished bases bilat. Crackles in L base   Incision:  No erythema, drainage or tenderness   Heart:  Regular rate and rhythm - ST, S1, S2 normal, no murmur, click, rub or gallop. Abdomen:   Soft, non-tender. Bowel sounds hypoactive. No masses,  No organomegaly. Extremities:  No edema. Palpable pulses bilat    Neurologic:  Gross motor and sensory apparatus intact      Labs:   Recent Labs     20  0500  20  0705   WBC 8.1   < >  --    HGB 8.3*   < >  --    HCT 25.7*   < >  --       < >  --       < >  --    K 3.6   < >  --    BUN 15   < >  --    CREA 0.81   < >  --    *   < >  --    GLUCPOC  --   --  123*    < > = values in this interval not displayed. Assessment:     Principal Problem:    S/P aortic dissection repair (2020)      Overview: Type A Aortic Dissection Repair, 24mm Hemashield Graft      Right Femoral Cutdown for Peripheral Cannulation      DHCA, Antegrade Cerebral Perfusion    Active Problems: Aortic dissection (HCC) (2020)      Postoperative anemia due to acute blood loss (2020)       Plan/Recommendations/Medical Decision Makin. S/p emergent Type A dissection: BP control, SBP < 140. Hold meds due to orthostatic hypotension. PRN Hydralazine if needed. 2. Small L pleural effusion/atelectasis: IS and TCDB. Increase activity. On RA. PA/Lat w/ residual small effusions, BP labile, unable to tolerate diuresis     3. Anemia: expected, postop. Monitor H&H. Improving. Cont Fe/Vit C    4. HTN: hold BB per above    5. HLD: Continue home pravastatin    6.  Hx of MR: ? Listed in chart, assessed on intra op SLOAN and no MR seen per verbal report    7. Thrombocytopenia: resolved    8. DVT/GI: SCD's, protonix    9. Leukocytosis: resolved, afebrile    10. Vagal episode, orthostatic BP: check orthostatics again today. Gave albumin 2/10, may need more volume (careful due to bilat effusions). 11. Hyperkalemia: resolved, likely hemolyzed sample     12. Sinus tach: -110's, BP unable to tolerate BB therapy. May be dry? Will discuss      Dispo: PT/OT/Cardiac Rehab. Remain inpatient until BP/HR improved.  Home w/ home health when medically stable     Signed By: Luzmaria Ang NP

## 2020-02-11 NOTE — PROGRESS NOTES
02/11/20 1110   Vital Signs   Temp 97.5 °F (36.4 °C)   Temp Source Oral   Pulse (Heart Rate) (!) 114   Heart Rate Source Monitor   Cardiac Rhythm Sinus Tach   Resp Rate 16   O2 Sat (%) 97 %   Level of Consciousness Alert   /44   MAP (Calculated) (!) 63   BP 1 Method Automatic   BP 1 Location Left arm   BP Patient Position Sitting   MEWS Score 3   mews of 3, HR elevated, CSS team aware,     1123: care assumed for pt. Pt received up in chair. 1140: pt returned to bed per request.  8736: OOB to chair for lunch. 1350: working with PT.  [phone]: orthostatics completed by PT and documented in his note. 1505: S/W  Hieu Conn NP regarding bp, obtained hold parameters for sbp less than 110 given pts orthostatic BP and low normal bp.  1700: OOB To chair  1915: ambulated x 150ft. Tolerated well, denies dizziness. 1930: Bedside and Verbal shift change report given to hayde kwong rn (oncoming nurse) by mike shaw rn (offgoing nurse). Report included the following information SBAR, Kardex, ED Summary, OR Summary, Intake/Output, MAR and Recent Results.

## 2020-02-11 NOTE — PROGRESS NOTES
Problem: Pressure Injury - Risk of  Goal: *Prevention of pressure injury  Description  Document Richard Scale and appropriate interventions in the flowsheet. Outcome: Progressing Towards Goal  Note: Pressure Injury Interventions:  Sensory Interventions: Assess changes in LOC, Keep linens dry and wrinkle-free, Minimize linen layers, Turn and reposition approx. every two hours (pillows and wedges if needed)    Moisture Interventions: Minimize layers    Activity Interventions: Chair cushion, Increase time out of bed, Pressure redistribution bed/mattress(bed type)    Mobility Interventions: Chair cushion, HOB 30 degrees or less, Pressure redistribution bed/mattress (bed type)    Nutrition Interventions: Document food/fluid/supplement intake    Friction and Shear Interventions: Lift sheet, Minimize layers, HOB 30 degrees or less                Problem: Patient Education: Go to Patient Education Activity  Goal: Patient/Family Education  Outcome: Progressing Towards Goal     Problem: Falls - Risk of  Goal: *Absence of Falls  Description  Document Carlo Fall Risk and appropriate interventions in the flowsheet. Outcome: Progressing Towards Goal  Note: Fall Risk Interventions:  Mobility Interventions: Communicate number of staff needed for ambulation/transfer, Patient to call before getting OOB    Mentation Interventions: Adequate sleep, hydration, pain control    Medication Interventions: Evaluate medications/consider consulting pharmacy, Patient to call before getting OOB, Teach patient to arise slowly, Utilize gait belt for transfers/ambulation    Elimination Interventions: Call light in reach, Patient to call for help with toileting needs, Stay With Me (per policy), Toilet paper/wipes in reach, Toileting schedule/hourly rounds              Problem: Patient Education: Go to Patient Education Activity  Goal: Patient/Family Education  Outcome: Progressing Towards Goal     Problem:  Aortic Aneurysm Repair: Discharge Outcomes  Goal: *Lungs clear or at baseline  Outcome: Progressing Towards Goal  Goal: *Weight  is stable  Outcome: Progressing Towards Goal  Goal: *Anxiety reduced or absent  Outcome: Progressing Towards Goal  Goal: *Verbalizes and demonstrates incision care  Outcome: Progressing Towards Goal

## 2020-02-11 NOTE — PROGRESS NOTES
Problem: Self Care Deficits Care Plan (Adult)  Goal: *Acute Goals and Plan of Care (Insert Text)  Description  FUNCTIONAL STATUS PRIOR TO ADMISSION: Patient was independent and active without use of DME.     HOME SUPPORT: The patient lived with  but did not need assistance.  and pt split household tasks,  cooked. Both are retired. Occupational Therapy Goals  Initiated 2/6/2020  1. Patient will perform ADLs standing 5 mins without fatigue or LOB with supervision/set-up within 7 day(s). 2.  Patient will perform lower body ADLs with supervision/set-up within 7 day(s). 3.  Patient will perform gathering ADL items high and low 2/2 with supervision/set-up within 7 day(s). 4.  Patient will perform toilet transfers with supervision/set-up within 7 day(s). 5.  Patient will perform all aspects of toileting with supervision/set-up within 7 day(s). 6.  Patient will participate in cardiac/sternal upper extremity therapeutic exercise/activities to increase independence with ADLs with supervision/set-up for 5 minutes within 7 day(s). Outcome: Progressing Towards Goal    OCCUPATIONAL THERAPY TREATMENT  Patient: Edy Merida (37 y.o. female)  Date: 2/11/2020  Diagnosis: Aortic dissection ([facility] [address] [I71.00]   S/P aortic dissection repair  Procedure(s) (LRB):  REPAIR TYPE A  AORTIC DISSECTION, DEEP HYPOTHERMIC CIRCULATORY ARREST, RIGHT FEMORAL CUTDOWN, ECC, SLOAN AND EPIAORTIC U/S BY DR TOLEDO SLOAN BY DR. Kendra Seymour. (N/A) 6 Days Post-Op  Precautions: Fall, Sternal(SBP <140)  Chart, occupational therapy assessment, plan of care, and goals were reviewed. ASSESSMENT  Patient continues with skilled OT services and is progressing towards goals. Pt with anticipated discharge home tomorrow and BPs stable this date with activity (see PT note), although noted tachycardia at rest (resting -122bpm).  Met with pt and family to review UB/LB dressing techniques as well as review bathing modifications and use of shower chair. Pt's family very supportive and asked appropriate questions regarding \"move in the tube\" and WB activities. Patient is verbalizing and is demonstrating understanding of mindful-based movements (\"move in the tube\") principles of keeping UEs proximal to ribcage to prevent lateral pull on the sternum during load-bearing activities with verbal cues required for compliance. Current Level of Function Impacting Discharge (ADLs): setup to min A ADLs    Other factors to consider for discharge: lives with family, active, enjoys swimming         PLAN :  Patient continues to benefit from skilled intervention to address the above impairments. Continue treatment per established plan of care. to address goals. Recommend with staff: OOB to chair for meals, mobilize, ADL participation     Recommend next OT session: any questions regarding ADLs prior to discharge     Recommendation for discharge: (in order for the patient to meet his/her long term goals)  No skilled occupational therapy/ follow up rehabilitation needs identified at this time. This discharge recommendation:  Has been made in collaboration with the attending provider and/or case management    IF patient discharges home will need the following DME: none       SUBJECTIVE:   Patient stated Jean-Claude Pinks about getting in and out of a car?     OBJECTIVE DATA SUMMARY:   Cognitive/Behavioral Status:  Neurologic State: Alert  Orientation Level: Oriented X4  Cognition: Follows commands  Perception: Appears intact  Perseveration: No perseveration noted  Safety/Judgement: Awareness of environment    Functional Mobility and Transfers for ADLs:  Bed Mobility:       Transfers:  Sit to Stand: Stand-by assistance          Balance:  Sitting: Intact; Without support  Sitting - Static: Good (unsupported)  Standing - Static: Good    ADL Intervention:                      Upper Body Dressing Assistance  Pullover Shirt: Minimum assistance  Cues: Physical assistance;Verbal cues provided              Cognitive Retraining  Safety/Judgement: Awareness of environment    Issued pt ADLs after cardiac sx handout to review dressing, toileting and bathing. Activity Tolerance:   Good- noted increased tachycardia at rest,   Please refer to the flowsheet for vital signs taken during this treatment.     After treatment patient left in no apparent distress:   Sitting in chair, Call bell within reach, and Caregiver / family present    COMMUNICATION/COLLABORATION:   The patients plan of care was discussed with: Physical Therapist and Registered Nurse    Pop Gray OT  Time Calculation: 26 mins'

## 2020-02-11 NOTE — PROGRESS NOTES
Problem: Mobility Impaired (Adult and Pediatric)  Goal: *Acute Goals and Plan of Care (Insert Text)  Description  FUNCTIONAL STATUS PRIOR TO ADMISSION: Patient was independent and active without use of DME.     HOME SUPPORT PRIOR TO ADMISSION: The patient lived with her , but did not require assistance with mobility.  and patient split household tasks.  cooked. Both are retired. Physical Therapy Goals  Initiated 2/6/2020; reviewed 2/11/2020 and remain appropriate  1. Patient will move from supine to sit and sit to supine, scoot up and down and roll side to side in bed with modified independence within 5 days. 2.  Patient will perform sit to/from stand with independence within 5 days. 3.  Patient will ambulate 200 feet with supervision/set-up within 5 days. 4.  Patient will ascend/descend 10 stairs with single handrail(s) with supervision/set-up within 5 days. 5.  Patient will perform cardiac exercises per protocol with independence within 5 days. 6.  Patient will verbally recall and functionally demonstrate mindful-based movements (\"move in the tube\") principles without cues within 5 days. Outcome: Progressing Towards Goal   PHYSICAL THERAPY TREATMENT: WEEKLY REASSESSMENT  Patient: Edy Merida (89 y.o. female)  Date: 2/11/2020  Primary Diagnosis: Aortic dissection ([address] [I71.00]  Procedure(s) (LRB):  REPAIR TYPE A  AORTIC DISSECTION, DEEP HYPOTHERMIC CIRCULATORY ARREST, RIGHT FEMORAL CUTDOWN, ECC, SLOAN AND EPIAORTIC U/S BY DR TOLEDO SLOAN BY DR. Kendra Seymour. (N/A) 6 Days Post-Op   Precautions:   Fall, Sternal(SBP <140)      ASSESSMENT  Patient continues with skilled PT services and is progressing towards goals. Patient received in bedside chair with stable BP. Noted her elevated HR starting at 116 resting and increased to a maximum of 135 BPM during activity. BP assessed multiple times during the sessiion in stance during and post activity.  Her BPs remained moderately low diastolic but stable throughout. She c/o fatigue at the onset of the session but no dizziness throughout. Gait with  initially and decreased arm swing but no LOB and SBA required for safety. Assessed 2 stairs with min-moderate assist d/t no railings available at home. Demonstrated hand hold technique to her  who verbalized understanding. This patient is progressing appropriately towards goals but has not met initial goals over the last week d/t multiple instances of orthostatic hypotension. Recommend discharge home with family and HHPT once medically stable. Vitals:    02/11/20 1348 02/11/20 1350 02/11/20 1351 02/11/20 1352   BP: 130/59 117/65 120/58 113/53   BP 1 Location:       BP Patient Position: Sitting Standing;Pre-activity Standing;During activity Standing;Post activity   Pulse:       Resp:       Temp:       SpO2:       Weight:       Height:             Patient's progression toward goals since last assessment: gait x120' today with SBA compared to [de-identified]' and moderate last session, patient following sternal precautions    Current Level of Function Impacting Discharge (mobility/balance): SBA for gait, consistently stable BP? Other factors to consider for discharge: good family support         PLAN :  Goals have been updated based on progression since last assessment. Patient continues to benefit from skilled intervention to address the above impairments. Recommendations and Planned Interventions: bed mobility training, transfer training, gait training, and therapeutic exercises      Frequency/Duration: Patient will be followed by physical therapy:  5 times a week to address goals.     Recommendation for discharge: (in order for the patient to meet his/her long term goals)  Physical therapy at least 2 days/week in the home     This discharge recommendation:  Has been made in collaboration with the attending provider and/or case management    IF patient discharges home will need the following DME: none         SUBJECTIVE:   Patient stated I am just tired.     OBJECTIVE DATA SUMMARY:   HISTORY:    Past Medical History:   Diagnosis Date    Hypertension     Mitral valve problem     leaking valve     Past Surgical History:   Procedure Laterality Date    HX OTHER SURGICAL  02/05/2020    Type A Aortic Dissection Repair       Personal factors and/or comorbidities impacting plan of care:     Home Situation  Home Environment: Private residence  # Steps to Enter: 2  Rails to Enter: Yes  Hand Rails : Bilateral  One/Two Story Residence: Two story  # of Interior Steps: 12  Interior Rails: Left  Lift Chair Available: No  Living Alone: No  Support Systems: Child(deven), Spouse/Significant Other/Partner  Patient Expects to be Discharged to[de-identified] Private residence  Current DME Used/Available at Home: Grab bars  Tub or Shower Type: Shower(also has tub bath, enjoys taking baths)    EXAMINATION/PRESENTATION/DECISION MAKING:   Critical Behavior:  Neurologic State: Alert, Appropriate for age  Orientation Level: Oriented X4  Cognition: Appropriate decision making, Appropriate safety awareness, Appropriate for age attention/concentration, Follows commands  Safety/Judgement: Awareness of environment  Hearing: Auditory  Auditory Impairment: None  Functional Mobility:  Bed Mobility:              Transfers:  Sit to Stand: Stand-by assistance  Stand to Sit: Stand-by assistance                       Balance:   Sitting: Intact; Without support  Sitting - Static: Good (unsupported)  Standing - Static: Good  Ambulation/Gait Training:  Distance (ft): 120 Feet (ft)(100 with standing rest and 20 to stairs)  Assistive Device: Gait belt  Ambulation - Level of Assistance: Stand-by assistance        Gait Abnormalities: Decreased step clearance(, decreased arm swing)              Speed/Chayito: Fluctuations                        Stairs:  Number of Stairs Trained: 2  Stairs - Level of Assistance: Minimum assistance; Moderate assistance   Rail Use: (handhold assist)          Activity Tolerance:   Fair and BP stable with intervention   Please refer to the flowsheet for vital signs taken during this treatment. After treatment patient left in no apparent distress:   Sitting in chair and Call bell within reach    COMMUNICATION/EDUCATION:   The patients plan of care was discussed with: Registered Nurse. Fall prevention education was provided and the patient/caregiver indicated understanding., Patient/family have participated as able in goal setting and plan of care. , and Patient/family agree to work toward stated goals and plan of care.     Thank you for this referral.  Mikayla Hummel, PT, DPT   Time Calculation: 25 mins

## 2020-02-11 NOTE — PROGRESS NOTES
02/11/20 0741   Vitals   Temp 98.2 °F (36.8 °C)   Temp Source Oral   Pulse (Heart Rate) (!) 113   Heart Rate Source Monitor   Resp Rate 16   O2 Sat (%) 95 %   Level of Consciousness Alert   /73   MAP (Calculated) 94   BP 1 Location Left arm   BP 1 Method Automatic   BP Patient Position Sitting   Cardiac Rhythm Sinus Tach   MEWS Score 3     MEWS 3. Alejandro Reed NP informed of increased HR. VORB to encourage fluids.

## 2020-02-12 VITALS
HEIGHT: 64 IN | BODY MASS INDEX: 25.74 KG/M2 | TEMPERATURE: 97.8 F | OXYGEN SATURATION: 97 % | WEIGHT: 150.79 LBS | RESPIRATION RATE: 17 BRPM | HEART RATE: 94 BPM | SYSTOLIC BLOOD PRESSURE: 122 MMHG | DIASTOLIC BLOOD PRESSURE: 51 MMHG

## 2020-02-12 LAB
ANION GAP SERPL CALC-SCNC: 3 MMOL/L (ref 5–15)
BUN SERPL-MCNC: 11 MG/DL (ref 6–20)
BUN/CREAT SERPL: 13 (ref 12–20)
CALCIUM SERPL-MCNC: 8.9 MG/DL (ref 8.5–10.1)
CHLORIDE SERPL-SCNC: 109 MMOL/L (ref 97–108)
CO2 SERPL-SCNC: 28 MMOL/L (ref 21–32)
CREAT SERPL-MCNC: 0.83 MG/DL (ref 0.55–1.02)
ERYTHROCYTE [DISTWIDTH] IN BLOOD BY AUTOMATED COUNT: 13.9 % (ref 11.5–14.5)
GLUCOSE SERPL-MCNC: 116 MG/DL (ref 65–100)
HCT VFR BLD AUTO: 27 % (ref 35–47)
HGB BLD-MCNC: 8.4 G/DL (ref 11.5–16)
MAGNESIUM SERPL-MCNC: 2.5 MG/DL (ref 1.6–2.4)
MCH RBC QN AUTO: 30.1 PG (ref 26–34)
MCHC RBC AUTO-ENTMCNC: 31.1 G/DL (ref 30–36.5)
MCV RBC AUTO: 96.8 FL (ref 80–99)
NRBC # BLD: 0 K/UL (ref 0–0.01)
NRBC BLD-RTO: 0 PER 100 WBC
PLATELET # BLD AUTO: 280 K/UL (ref 150–400)
PMV BLD AUTO: 10.3 FL (ref 8.9–12.9)
POTASSIUM SERPL-SCNC: 3.9 MMOL/L (ref 3.5–5.1)
RBC # BLD AUTO: 2.79 M/UL (ref 3.8–5.2)
SODIUM SERPL-SCNC: 140 MMOL/L (ref 136–145)
WBC # BLD AUTO: 10.8 K/UL (ref 3.6–11)

## 2020-02-12 PROCEDURE — 85027 COMPLETE CBC AUTOMATED: CPT

## 2020-02-12 PROCEDURE — 80048 BASIC METABOLIC PNL TOTAL CA: CPT

## 2020-02-12 PROCEDURE — 74011250637 HC RX REV CODE- 250/637: Performed by: NURSE PRACTITIONER

## 2020-02-12 PROCEDURE — 36415 COLL VENOUS BLD VENIPUNCTURE: CPT

## 2020-02-12 PROCEDURE — 83735 ASSAY OF MAGNESIUM: CPT

## 2020-02-12 PROCEDURE — 74011250637 HC RX REV CODE- 250/637: Performed by: PHYSICIAN ASSISTANT

## 2020-02-12 RX ORDER — VITAMIN E 1000 UNIT
1000 CAPSULE ORAL DAILY
Qty: 30 TAB | Refills: 0 | Status: SHIPPED | OUTPATIENT
Start: 2020-02-13 | End: 2020-03-09

## 2020-02-12 RX ORDER — LANOLIN ALCOHOL/MO/W.PET/CERES
325 CREAM (GRAM) TOPICAL
Qty: 30 TAB | Refills: 0 | Status: SHIPPED | OUTPATIENT
Start: 2020-02-13 | End: 2020-03-09

## 2020-02-12 RX ORDER — AMOXICILLIN 250 MG
1 CAPSULE ORAL
Qty: 28 TAB | Refills: 0 | Status: SHIPPED | OUTPATIENT
Start: 2020-02-12 | End: 2020-02-26

## 2020-02-12 RX ORDER — ACETAMINOPHEN 325 MG/1
650 TABLET ORAL
Qty: 30 TAB | Refills: 0 | Status: SHIPPED
Start: 2020-02-12 | End: 2020-07-28 | Stop reason: ALTCHOICE

## 2020-02-12 RX ORDER — AMIODARONE HYDROCHLORIDE 200 MG/1
400 TABLET ORAL EVERY 12 HOURS
Qty: 84 TAB | Refills: 0 | Status: SHIPPED | OUTPATIENT
Start: 2020-02-12 | End: 2020-03-09

## 2020-02-12 RX ORDER — POLYETHYLENE GLYCOL 3350 17 G/17G
17 POWDER, FOR SOLUTION ORAL
Qty: 14 PACKET | Refills: 0 | Status: SHIPPED | OUTPATIENT
Start: 2020-02-12 | End: 2020-02-26

## 2020-02-12 RX ORDER — OXYCODONE HYDROCHLORIDE 5 MG/1
5 TABLET ORAL
Qty: 20 TAB | Refills: 0 | Status: SHIPPED | OUTPATIENT
Start: 2020-02-12 | End: 2020-02-17

## 2020-02-12 RX ORDER — METOPROLOL TARTRATE 25 MG/1
12.5 TABLET, FILM COATED ORAL EVERY 12 HOURS
Qty: 30 TAB | Refills: 1 | Status: SHIPPED | OUTPATIENT
Start: 2020-02-12 | End: 2020-03-30 | Stop reason: ALTCHOICE

## 2020-02-12 RX ADMIN — CHLORHEXIDINE GLUCONATE 10 ML: 1.2 RINSE ORAL at 08:17

## 2020-02-12 RX ADMIN — POLYETHYLENE GLYCOL 3350 17 G: 17 POWDER, FOR SOLUTION ORAL at 08:13

## 2020-02-12 RX ADMIN — PRAVASTATIN SODIUM 20 MG: 20 TABLET ORAL at 08:14

## 2020-02-12 RX ADMIN — METOPROLOL TARTRATE 12.5 MG: 25 TABLET ORAL at 08:14

## 2020-02-12 RX ADMIN — AMIODARONE HYDROCHLORIDE 400 MG: 200 TABLET ORAL at 08:13

## 2020-02-12 RX ADMIN — ASPIRIN 81 MG 81 MG: 81 TABLET ORAL at 08:14

## 2020-02-12 RX ADMIN — PANTOPRAZOLE SODIUM 40 MG: 40 TABLET, DELAYED RELEASE ORAL at 06:55

## 2020-02-12 RX ADMIN — FERROUS SULFATE TAB 325 MG (65 MG ELEMENTAL FE) 325 MG: 325 (65 FE) TAB at 08:14

## 2020-02-12 RX ADMIN — MAGNESIUM OXIDE TAB 400 MG (241.3 MG ELEMENTAL MG) 400 MG: 400 (241.3 MG) TAB at 08:14

## 2020-02-12 RX ADMIN — Medication 10 ML: at 06:56

## 2020-02-12 RX ADMIN — OXYCODONE HYDROCHLORIDE AND ACETAMINOPHEN 1000 MG: 500 TABLET ORAL at 08:13

## 2020-02-12 RX ADMIN — SENNOSIDES AND DOCUSATE SODIUM 1 TABLET: 8.6; 5 TABLET ORAL at 08:13

## 2020-02-12 NOTE — PROGRESS NOTES
Cardiac Surgery Care Coordinator- met with Duc Schwartz and her family, she is preparing for immediate discharge. She is without questions or concerns will follow up with a phone call after discharge.  Shirley Bennett RN

## 2020-02-12 NOTE — DISCHARGE INSTRUCTIONS
[facility]     [address]  [address]                                                           [address] [place] [address]  Office- [phone]  Fax- [phone]        Office- [phone]  Fax- [phone]  _______________________________________________________________  Dr. Alfonso Beaver, NP Sal Moraes, [address]  Dr. Corey León, NP  Jose Gibson, [place]   Dr. Kathryn Love, NP Jassi Stuart, [address]              Lonzell Pod, NP  Sherren Heading, [address]           Jennifer Light, [address]    Name:[facility]     Surgery & Date: Procedure(s):  REPAIR TYPE A  AORTIC DISSECTION, DEEP HYPOTHERMIC CIRCULATORY ARREST, RIGHT FEMORAL CUTDOWN, ECC, SLOAN AND EPIAORTIC U/S BY DR TOLEDO SLOAN BY DR. Melany Fernandez. Discharge Date: 2/12/20     MEDICATIONS:  Please refer to your After Visit Summary for your medication list. If you do not have a prescription for a new medication, you may purchase the medication over the counter. DO NOT TAKE ANY MEDICATIONS THAT ARE NOT ON THIS LIST    INSTRUCTIONS:  1. NO SMOKING OR TOBACCO PRODUCTS  2. Follow all the instructions in your discharge book  3. You may shower. Wash all incisions twice daily with mild soap and water. No lotions, ointments or powder. 4. Call the office immediately for any redness, swelling, or drainage from your incision. 5. Take your temperature daily and call for a temperature of 101 degrees or higher or for any symptoms that make you think you have and infection. 6. Weigh yourself each morning. Call if you gain more than 5 pounds in 48 hours. 7. Use the incentive spirometer 6-8 times a day-10 breaths each time. Use a pillow or your bear to splint your breastbone when coughing or sneezing. 8. If you feel your breast bone clicking or popping, notify the office immediately.    9. Walk several hundred feet several times daily. DIET  Eat an American Heart Association diet. If you are having trouble with your appetite, eat what you can. Try eating small, frequent meals throughout the day. ACTIVITY  1. NO DRIVING--you will be evaluated to drive at your follow up visit. 2. Increase your activity by walking several times a day. Stay out of bed most of the day. 3. When sitting, keep your legs elevated. 4. You may ride in a car, but you must get out every hour and walk around. If you ride in a car with an airbag that can not be switched off, put the seat ALL the way back or ride in the back seat. 5. Any load bearing activity can be performed as long as it can be performed \"in the tube\". You can reach \"out of the tube\" when performing activities that don't require heavy lifting. Let pain be your guide, your pain level will keep you from doing anything extreme. FOLLOW UP  1. Your first follow up appointment will be on 2/17/20 at 11:30 am. Our office is located in [address] [place] [address] on the 4th floor, [address]. Your second follow up appointment will be in four weeks, on 3/9/20 at 1:15 pm. Please call our office at [phone] if you are unable to make either one of these appointments. 2. You will be receiving a call before your 5 day appointment to begin cardiac rehab. They are located in the [address] [place] [address] on [facility]. Their phone number is [phone]. Please call if you have not been contacted 2-3 weeks after discharge from the hospital.  3. We will make an appointment with your cardiologist at your last appointment. 4. Consult you primary care physician regarding your influenza &   pneumovax vaccines. 5.   Please bring all medications with you to your appointment.     Signature:___________________________________________________

## 2020-02-12 NOTE — DISCHARGE SUMMARY
[facility] Discharge Summary     Patient ID:  Elodia Muniz  [id number]  75 y.o.  1945    Admit date: 2/5/2020    Discharge date: 2/12/2020     Admitting Physician: Rudy Bateman MD     Referring Cardiologist:  n/a    PCP:  Saman Lugo MD    Admitting Diagnoses:   1. Type A dissection    Discharge Diagnoses:     Hospital Problems  Date Reviewed: 2/5/2020          Codes Class Noted POA    * (Principal) S/P aortic dissection repair ICD-10-CM: M95.881  ICD-9-CM: V45.89  2/5/2020 Unknown    Overview Addendum 2/5/2020  9:27 AM by GAIL Jhaveri     Type A Aortic Dissection Repair, 24mm Hemashield Graft  Right Femoral Cutdown for Peripheral Cannulation  DHCA, Antegrade Cerebral Perfusion             Aortic dissection ([facility] [address] ICD-10-CM: I71.00  ICD-9-CM: 441.00  2/5/2020 Unknown        Postoperative anemia due to acute blood loss ICD-10-CM: D62  ICD-9-CM: 285.1  2/5/2020 Unknown              Discharged Condition: improved    Disposition: home, see patient instructions for treatment and plan    Procedures for this admission:  Procedure(s):  REPAIR TYPE A  AORTIC DISSECTION, DEEP HYPOTHERMIC CIRCULATORY ARREST, RIGHT FEMORAL CUTDOWN, ECC, SLOAN AND EPIAORTIC U/S BY DR TOLEDO SLOAN BY DR. Danyelle Ge. Discharge Medications:      My Medications      START taking these medications      Instructions Each Dose to Equal Morning Noon Evening Bedtime   acetaminophen 325 mg tablet  Commonly known as:  TYLENOL    Your last dose was: Your next dose is: Take 2 Tabs by mouth every six (6) hours as needed for Pain or Fever. 650 mg                 amiodarone 200 mg tablet  Commonly known as:  CORDARONE    Your last dose was: Your next dose is: Take 2 Tabs by mouth every twelve (12) hours. Take a total of 400 mg twice a day for 2 weeks, then decrease to 400 mg daily for 2 more weeks. After 4 weeks STOP this medication.    400 mg                 ascorbic acid (vitamin C) 1,000 mg tablet  Commonly known as: VITAMIN C  Start taking on:  February 13, 2020    Your last dose was: Your next dose is: Take 1 Tab by mouth daily for 30 days. 1,000 mg                 ferrous sulfate 325 mg (65 mg iron) tablet  Start taking on:  February 13, 2020    Your last dose was: Your next dose is: Take 1 Tab by mouth daily (with breakfast) for 30 days. 325 mg                 metoprolol tartrate 25 mg tablet  Commonly known as:  LOPRESSOR    Your last dose was: Your next dose is: Take 0.5 Tabs by mouth every twelve (12) hours for 60 days. Different than what you were taking before surgery   12.5 mg                 oxyCODONE IR 5 mg immediate release tablet  Commonly known as:  ROXICODONE    Your last dose was: Your next dose is: Take 1 Tab by mouth every six (6) hours as needed for Pain for up to 5 days. Max Daily Amount: 20 mg.   5 mg                 polyethylene glycol 17 gram packet  Commonly known as:  MIRALAX    Your last dose was: Your next dose is: Take 1 Packet by mouth daily as needed for Constipation for up to 14 days. 17 g                 senna-docusate 8.6-50 mg per tablet  Commonly known as:  PERICOLACE    Your last dose was: Your next dose is: Take 1 Tab by mouth two (2) times daily as needed for Constipation for up to 14 days. 1 Tab                    CONTINUE taking these medications      Instructions Each Dose to Equal Morning Noon Evening Bedtime   pravastatin 20 mg tablet  Commonly known as:  PRAVACHOL    Your last dose was: Your next dose is: Take 20 mg by mouth daily. 20 mg                 triazolam 0.125 mg tablet  Commonly known as:  HALCION    Your last dose was: Your next dose is: Take 0.125 mg by mouth daily.    0.125 mg                    STOP taking these medications    losartan 50 mg tablet  Commonly known as:  COZAAR        metoprolol succinate 25 mg XL tablet  Commonly known as:  TOPROL-XL Where to Get Your Medications      [address] [address], [place] [address]    Phone:  [phone]   · amiodarone 200 mg tablet  · ascorbic acid (vitamin C) 1,000 mg tablet  · ferrous sulfate 325 mg (65 mg iron) tablet  · metoprolol tartrate 25 mg tablet  · polyethylene glycol 17 gram packet  · senna-docusate 8.6-50 mg per tablet     Information on where to get these meds will be given to you by the nurse or doctor. Ask your nurse or doctor about these medications  · acetaminophen 325 mg tablet  · oxyCODONE IR 5 mg immediate release tablet       HPI:  David Cline is a 69 y.o. female who was referred for cardiac surgery evaluation of Aortic Dissection by [address] [place] [address] ED. PMHx significant for HTN & mitral regurgitation of unknown significance. Patient presented to the ED with several hours of chest pain that began abruptly while at home. The pain was described as sharp/pressure, radiated into her back. CT scan of her chest, abdomen, and pelvis were done showing a type A aortic dissection from the aortic root to the iliac bifurcation. She was emergently transferred here for surgical repair. Hospital Course:   On 2/5/20 pt underwent emergent Type A dissection repair by Dr. Marvel Alfonso. Please see operative report for full details. She was transferred to the ICU in stable condition on the following drips: precedex, insulin and kavya. She was extubated on 2/5/20 at 1455. Pt progressed well but did have some issue with orthostatic hypotension. This resolved with hydration. Pt was stable for d/c home on POD#7 with the following plan:    1. S/p emergent Type A dissection: BP control, SBP < 140. Tolerated metoprolol - cont 12.5 mg bid     2. Small L pleural effusion/atelectasis: IS and TCDB. Increase activity. On RA.  PA/Lat w/ residual small effusions, BP unable to tolerate diuretics     3. Anemia: expected, postop. Monitor H&H. Improving. Cont Fe/Vit C     4. HTN: cont metoprolol 12.5 mg     5. HLD: Continue home pravastatin     6. Hx of MR: ? Listed in chart, assessed on intra op SLOAN and no MR seen per verbal report     7. Thrombocytopenia: resolved     8. DVT/GI: SCD's, protonix     9. Leukocytosis: resolved, afebrile     10. Vagal episode, orthostatic BP: resolved     11. Hyperkalemia: resolved, likely hemolyzed sample      12. Sinus tach: resolved w/ metoprolol      Dispo: PT/OT/Cardiac Rehab. Home today w/ home health     Referral to outpatient cardiac rehab made. Discharge Vital Signs:   Visit Vitals  /51 (BP 1 Location: Left arm, BP Patient Position: Sitting)   Pulse 94   Temp 97.8 °F (36.6 °C)   Resp 17   Ht 5' 4\" (1.626 m)   Wt 150 lb 12.7 oz (68.4 kg)   SpO2 97%   BMI 25.88 kg/m²       Labs:   Recent Labs     02/12/20  0501   WBC 10.8   HGB 8.4*   HCT 27.0*         K 3.9   BUN 11   CREA 0.83   *       Diagnostics:   PA/lateral: IMPRESSION:     Unchanged small right and decreased small left pleural effusions. No  Pneumothorax. Patient Instructions/Follow Up Care:  Discharge instructions were reviewed with the patient and family present. Questions were also answered at this time. Prescriptions and medications were reviewed. The patient has a follow up appointment with the Nurse Practitioner or Physician's Assistant on 2/17/20 at 11:30 am and with Dr. Jyoti Santos on 3/9/20 at 1:15 pm. The patient was also instructed to follow up with her primary care physician as needed. The patient and family were encouraged to call with any questions or concerns.      Signed:  Barb Crum NP  2/12/2020  10:23 AM

## 2020-02-12 NOTE — PROGRESS NOTES
CSS Progress Note    Admit Date: 2020  POD:  7    Procedure:  Procedure(s):  REPAIR TYPE A  AORTIC DISSECTION/ RIGHT FEMORAL CUTDOWN. SLOAN BY DR. Yari Holm. Subjective:   Patient seen with Dr. Elie Landaverde. Pt in chair, tolerated metoprolol yesterday. Ready for d/c, +BM       Objective:   Vitals:  Blood pressure 139/68, pulse 95, temperature 98.4 °F (36.9 °C), resp. rate 18, height 5' 4\" (1.626 m), weight 150 lb 12.7 oz (68.4 kg), SpO2 95 %. Temp (24hrs), Av °F (36.7 °C), Min:97.5 °F (36.4 °C), Max:98.4 °F (36.9 °C)    EKG/Rhythm: SR    Oxygen Therapy:  Oxygen Therapy  O2 Sat (%): 95 % (20 0453)  Pulse via Oximetry: 82 beats per minute (20 0900)  O2 Device: Room air (20 0453)  O2 Flow Rate (L/min): 2 l/min (20 0807)  FIO2 (%): 50 % (20 1141)    CXR:   CXR Results  (Last 48 hours)    None          Admission Weight: Last Weight   Weight: 149 lb 14.6 oz (68 kg) Weight: 150 lb 12.7 oz (68.4 kg)     Intake / Output / Drain:  Current Shift: No intake/output data recorded. Last 24 hrs.:     Intake/Output Summary (Last 24 hours) at 2020 0752  Last data filed at 2020 0453  Gross per 24 hour   Intake 720 ml   Output    Net 720 ml       EXAM:  General:  Pleasant, no apparent distress                                                                                         Lungs:   Diminished bases bilat. Crackles in L base   Incision:  No erythema, drainage or tenderness   Heart:  Regular rate and rhythm, S1, S2 normal, no murmur, click, rub or gallop. Abdomen:   Soft, non-tender. Bowel sounds normal. No masses,  No organomegaly. Extremities:  No edema.  Palpable pulses bilat    Neurologic:  Gross motor and sensory apparatus intact      Labs:   Recent Labs     20  0501   WBC 10.8   HGB 8.4*   HCT 27.0*         K 3.9   BUN 11   CREA 0.83   *        Assessment:     Principal Problem:    S/P aortic dissection repair (2020)      Overview: Type A Aortic Dissection Repair, 24mm Hemashield Graft      Right Femoral Cutdown for Peripheral Cannulation      DHCA, Antegrade Cerebral Perfusion    Active Problems: Aortic dissection (HCC) (2020)      Postoperative anemia due to acute blood loss (2020)       Plan/Recommendations/Medical Decision Makin. S/p emergent Type A dissection: BP control, SBP < 140. Tolerated metoprolol - cont 12.5 mg bid    2. Small L pleural effusion/atelectasis: IS and TCDB. Increase activity. On RA. PA/Lat w/ residual small effusions, BP unable to tolerate diuretics    3. Anemia: expected, postop. Monitor H&H. Improving. Cont Fe/Vit C    4. HTN: cont metoprolol 12.5 mg    5. HLD: Continue home pravastatin    6. Hx of MR: ? Listed in chart, assessed on intra op SLOAN and no MR seen per verbal report    7. Thrombocytopenia: resolved    8. DVT/GI: SCD's, protonix    9. Leukocytosis: resolved, afebrile    10. Vagal episode, orthostatic BP: resolved    11. Hyperkalemia: resolved, likely hemolyzed sample     12. Sinus tach: resolved w/ metoprolol     Dispo: PT/OT/Cardiac Rehab.  Home today w/ home health     Signed By: Mitzie Boeck, NP

## 2020-02-12 NOTE — PROGRESS NOTES
Transitions of Care:     -Patient will discharge home with [facility] for skilled nursing and PT   -Family to transport   -Cardiac Surgery follow-up    The CM met with the patient, spouse, and daughter Amrita Feliciano at bedside- family denied having concerns for transition home today. CM called Laura Or with [facility]- aware of discharge today and will schedule visit tomorrow, 2/13. Information on AVS. CM presented IM letter to patient and family, no other concerns identified at this time.  KERVIN Faust

## 2020-02-12 NOTE — PROGRESS NOTES
1930: Bedside and Verbal shift change report given to DOTTIE Guthrie (oncoming nurse) by Kimber Roca RN  (offgoing nurse). Report included the following information SBAR, Kardex, Intake/Output, MAR, Recent Results and Cardiac Rhythm ST.   0730: Bedside and Verbal shift change report given to [address] [place] [address], RN (oncoming nurse) by DOTTIE Guthrie  (offgoing nurse).  Report included the following information SBAR, Kardex, Intake/Output, MAR, Recent Results and Cardiac Rhythm ST.

## 2020-02-12 NOTE — PROGRESS NOTES
0728 Bedside and Verbal shift change report given to DOUGLAS Landa (oncoming nurse) by Jace Mayberry (offgoing nurse). Report included the following information SBAR, Kardex, Intake/Output, MAR, Recent Results, Med Rec Status and Cardiac Rhythm NSR/ST.     1025 IVs and telemetry removed. 1040 Discharge medications reviewed with patient and spouse and appropriate educational materials and side effects teaching were provided. MEFs given to pt. I have reviewed discharge instructions with the patient and spouse. The patient and spouse verbalized understanding.

## 2020-02-12 NOTE — PROGRESS NOTES
Cardiac Surgery Care Coordinator- Met with Mariam Wiggins and her family,  reviewed plan of care and discussed upcoming discharge date. Reinforced sternal precautions and encouraged continued use of the incentive spirometer. Began discharge teaching and encouraged her to verbalize. Reviewed goals for the day and discussed the  importance of increased activity and continued activity after discharge. Reviewed importance of wearing the red reminder bracelet and when to call MD. Will continue to follow for educational and emotional needs.  Kylah Moscoso RN

## 2020-02-13 ENCOUNTER — TELEPHONE (OUTPATIENT)
Dept: CASE MANAGEMENT | Age: 75
End: 2020-02-13

## 2020-02-13 NOTE — TELEPHONE ENCOUNTER
Cardiac Surgery Discharge - Follow up call placed to [address] to her daughter and encouraged them to verbalize. Reviewed plan of care after discharge and encouraged them to verbalize. Discussed precautions and reviewed medications, they are without questions regarding medications. Encouraged continued use of the incentive spirometer. Confirmed follow up appts and reinforced importance of wearing red reminder bracelet. Gerrit Phalen is without questions or concerns.  Farida Graham RN

## 2020-02-17 ENCOUNTER — OFFICE VISIT (OUTPATIENT)
Dept: CARDIOLOGY CLINIC | Age: 75
End: 2020-02-17

## 2020-02-17 VITALS
RESPIRATION RATE: 16 BRPM | DIASTOLIC BLOOD PRESSURE: 68 MMHG | WEIGHT: 150 LBS | TEMPERATURE: 98.4 F | OXYGEN SATURATION: 99 % | HEART RATE: 84 BPM | HEIGHT: 64 IN | BODY MASS INDEX: 25.61 KG/M2 | SYSTOLIC BLOOD PRESSURE: 118 MMHG

## 2020-02-17 DIAGNOSIS — Z98.890 S/P AORTIC DISSECTION REPAIR: Primary | ICD-10-CM

## 2020-02-17 NOTE — PROGRESS NOTES
Patient: Jc Dong   Age: 69 y.o. Patient Care Team:  Tammi Huggins MD as PCP - [facility])    Diagnosis: The encounter diagnosis was S/P aortic dissection repair. Problem List:   Patient Active Problem List   Diagnosis Code    S/P aortic dissection repair Z98.890    Aortic dissection (HCC) I71.00    Postoperative anemia due to acute blood loss D62        Date of Surgery: 2/5/20    Surgery: s/p Emergent Type A dissection    HPI: Pt is here for routine postop appt. Pt is feeling well. Getting around without issues. She does report some SOB when going up stairs inside her home. Daughter reports 14 stairs and states her mother goes up them quickly. Denies pain, states chest is achy and she only takes 325mg tylenol at night before bed. She also reports not sleeping well, stating she wakes up every couple hours. Her daughter states she is taking long naps during the day and this may be the cause. Current Medications:   Current Outpatient Medications   Medication Sig Dispense Refill    acetaminophen (TYLENOL) 325 mg tablet Take 2 Tabs by mouth every six (6) hours as needed for Pain or Fever. 30 Tab 0    amiodarone (CORDARONE) 200 mg tablet Take 2 Tabs by mouth every twelve (12) hours. Take a total of 400 mg twice a day for 2 weeks, then decrease to 400 mg daily for 2 more weeks. After 4 weeks STOP this medication. 84 Tab 0    ascorbic acid, vitamin C, (VITAMIN C) 1,000 mg tablet Take 1 Tab by mouth daily for 30 days. 30 Tab 0    ferrous sulfate 325 mg (65 mg iron) tablet Take 1 Tab by mouth daily (with breakfast) for 30 days. 30 Tab 0    metoprolol tartrate (LOPRESSOR) 25 mg tablet Take 0.5 Tabs by mouth every twelve (12) hours for 60 days. Different than what you were taking before surgery 30 Tab 1    polyethylene glycol (MIRALAX) 17 gram packet Take 1 Packet by mouth daily as needed for Constipation for up to 14 days.  14 Packet 0    senna-docusate (PERICOLACE) 8.6-50 mg per tablet Take 1 Tab by mouth two (2) times daily as needed for Constipation for up to 14 days. 28 Tab 0    pravastatin (PRAVACHOL) 20 mg tablet Take 20 mg by mouth daily. Vitals: Blood pressure 118/68, pulse 84, temperature 98.4 °F (36.9 °C), temperature source Oral, resp. rate 16, height 5' 4\" (1.626 m), weight 150 lb (68 kg), SpO2 99 %. Allergies: is allergic to other food; walnut; alcohol; honeydew; rufino; and penicillins. Physical Exam:  Wounds: clean, dry, no drainage, healing    Lungs: clear to auscultation bilaterally    Heart: regular rate and rhythm, S1, S2 normal, no murmur, click, rub or gallop    Extremities: normal strength, tone, and muscle mass    Assessment/Plan:   1. S/p emergent Type A dissection: BP control, SBP < 140. Tolerated metoprolol - cont 12.5 mg bid     2. Anemia: expected, postop. Monitor H&H. Improving. Cont Fe/Vit C     3. HTN: cont metoprolol 12.5 mg     4. HLD: Continue home pravastatin     5. Hx of MR: ? Listed in chart, assessed on intra op SLOAN and no MR seen    6. Sinus tach: resolved w/ metoprolol     Pt is ready to begin cardiac rehab.  F/u in 2-3 weeks     Rehab - can begin at anytime  Walking: yes  Glucometer: n/a

## 2020-02-20 ENCOUNTER — TELEPHONE (OUTPATIENT)
Dept: CARDIOLOGY CLINIC | Age: 75
End: 2020-02-20

## 2020-03-09 ENCOUNTER — OFFICE VISIT (OUTPATIENT)
Dept: CARDIOLOGY CLINIC | Age: 75
End: 2020-03-09

## 2020-03-09 VITALS
SYSTOLIC BLOOD PRESSURE: 126 MMHG | OXYGEN SATURATION: 98 % | TEMPERATURE: 98.4 F | HEIGHT: 64 IN | WEIGHT: 144 LBS | HEART RATE: 89 BPM | DIASTOLIC BLOOD PRESSURE: 82 MMHG | BODY MASS INDEX: 24.59 KG/M2 | RESPIRATION RATE: 16 BRPM

## 2020-03-09 DIAGNOSIS — Z98.890 S/P AORTIC DISSECTION REPAIR: Primary | ICD-10-CM

## 2020-03-09 RX ORDER — POLYETHYLENE GLYCOL 3350 17 G/17G
17 POWDER, FOR SOLUTION ORAL DAILY
COMMUNITY

## 2020-03-09 RX ORDER — DOCUSATE SODIUM AND SENNOSIDES 8.6; 5 MG/1; MG/1
TABLET ORAL
COMMUNITY
Start: 2020-02-27 | End: 2020-07-28 | Stop reason: ALTCHOICE

## 2020-03-09 RX ORDER — LOSARTAN POTASSIUM 50 MG/1
50 TABLET ORAL DAILY
COMMUNITY
End: 2022-03-21 | Stop reason: SDUPTHER

## 2020-03-09 NOTE — PROGRESS NOTES
Patient: Giuseppe Saravia   Age: 69 y.o. Patient Care Team:  Dann Roberts MD as PCP - [facility])    Diagnosis: The encounter diagnosis was S/P aortic dissection repair. Problem List:   Patient Active Problem List   Diagnosis Code    S/P aortic dissection repair Z98.890    Aortic dissection (HCC) I71.00    Postoperative anemia due to acute blood loss D62        Date of Surgery: 2/5/20    Surgery: s/p Emergent Type A dissection    HPI: Pt is here for routine postop appt. Pt placed back on cozaar by PCP, BP tolerating. No complaints today. Occasional pain, using tylenol less frequently. Daughter reports she has been less fatigued and is no longer taking long naps in the afternoons. Today is her last day with home health. She is still c/o constipation, she is taking senna and miralax daily. Pt seen with Dr. Sara Jurado. Current Medications:   Current Outpatient Medications   Medication Sig Dispense Refill    polyethylene glycol (MIRALAX) 17 gram/dose powder Take 17 g by mouth daily.  losartan (COZAAR) 25 mg tablet Take  by mouth daily.  SENNA-S 8.6-50 mg per tablet TK 1 T PO  IN THE EVENING PRN      acetaminophen (TYLENOL) 325 mg tablet Take 2 Tabs by mouth every six (6) hours as needed for Pain or Fever. 30 Tab 0    metoprolol tartrate (LOPRESSOR) 25 mg tablet Take 0.5 Tabs by mouth every twelve (12) hours for 60 days. Different than what you were taking before surgery 30 Tab 1    pravastatin (PRAVACHOL) 20 mg tablet Take 20 mg by mouth daily. Vitals: Blood pressure 126/82, pulse 89, temperature 98.4 °F (36.9 °C), temperature source Oral, resp. rate 16, height 5' 4\" (1.626 m), weight 144 lb (65.3 kg), SpO2 98 %. Allergies: is allergic to other food; walnut; alcohol; honeydew; rufnio; and penicillins. Physical Exam:  Wounds: clean, dry, no drainage, healing.  Faint bruising    Lungs: clear to auscultation bilaterally    Heart: regular rate and rhythm, S1, S2 normal, 3/6 murmur, click, rub or gallop    Extremities: normal strength, tone, and muscle mass    Assessment/Plan:   1. S/p emergent Type A dissection: BP control, SBP < 140. Tolerated metoprolol - cont 12.5 mg bid. Losartan restarted per PCP     2. Anemia: expected, postop. Monitor H&H. Improving. Cont Fe/Vit C - will finish soon     3. HTN: cont metoprolol 12.5 mg and losartan 25 mg     4. HLD: Continue home pravastatin     5. Hx of MR: ? Listed in chart, assessed on intra op SLOAN and no MR seen. + Murmur on today's visit    6. Sinus tach: resolved w/ metoprolol     F/u with PCP and Cardiology. Pt requested for recommendation from Dr. Luigi Mcnulty for a Cardiologist at [facility].      Rehab - doesn't qualify as dissection   Walking: yes  Glucometer: n/a

## 2020-03-26 ENCOUNTER — TELEPHONE (OUTPATIENT)
Dept: CARDIOLOGY CLINIC | Age: 75
End: 2020-03-26

## 2020-03-30 ENCOUNTER — VIRTUAL VISIT (OUTPATIENT)
Dept: CARDIOLOGY CLINIC | Age: 75
End: 2020-03-30

## 2020-03-30 DIAGNOSIS — I34.0 NONRHEUMATIC MITRAL VALVE REGURGITATION: ICD-10-CM

## 2020-03-30 DIAGNOSIS — Z98.890 S/P AORTIC DISSECTION REPAIR: ICD-10-CM

## 2020-03-30 DIAGNOSIS — I10 ESSENTIAL HYPERTENSION: ICD-10-CM

## 2020-03-30 DIAGNOSIS — I71.019 DISSECTION OF THORACIC AORTA: Primary | ICD-10-CM

## 2020-03-30 RX ORDER — METOPROLOL SUCCINATE 50 MG/1
50 TABLET, EXTENDED RELEASE ORAL DAILY
Qty: 90 TAB | Refills: 3
Start: 2020-03-30 | End: 2020-04-10 | Stop reason: SDUPTHER

## 2020-03-30 NOTE — PROGRESS NOTES
VIRTUAL VISIT DOCUMENTATION     Pursuant to the emergency declaration under the [facility], [facility] waiver authority and the [employer] Act, this Virtual  Visit was conducted, with patient's consent, to reduce the patient's risk of exposure to COVID-19 and provide continuity of care for an established patient. Services were provided through a video synchronous discussion virtually to substitute for in-person clinic visit. CHIEF COMPLAINT      Lalo Valerio is a 69 y.o. female who was seen by synchronous (real-time) audio-video technology on 3/30/2020. Patient is being seen today for first visit. F/u for evaluation and management post aortic dissection repair. ASSESSMENT        ICD-10-CM ICD-9-CM    1. Dissection of thoracic aorta (HCC) I71.01 441.01 ECHO ADULT COMPLETE   2. S/P aortic dissection repair Z98.890 V45.89 ECHO ADULT COMPLETE   3. Essential hypertension I10 401.9 ECHO ADULT COMPLETE   4. Nonrheumatic mitral valve regurgitation I34.0 424.0 ECHO ADULT COMPLETE        PLAN     Type A Aortic Dissection 2/5/20 s/p repair. Remarkably good recovery. Risk factors include HTN. No evidence of bicuspid pathology (personalized reviewed intraop SLOAN images of AoV)  - Increase metoprolol XL from 25 to 50mg/d. She will start with 37.5 mg first.  - Continue recommended exercises, regular walking. No cardiac rehab d/t COVID-19  - Echo in 3 months  - Chest CTA in 3 months    Mitral regurgitation by hx. Significant MR murmur noted on recent post-op exam by cardiac surgery. No sxs of HF.   - Echo in 3 months as above. Chronic HTN, controlled on combination therapy. - Increase metoprolol for aortic protection as discussed above.   __  Follow-up and Dispositions    · Return in about 3 months (around 6/30/2020). We discussed the expected course, resolution and complications of the diagnosis(es) in detail. Medication risks, benefits, costs, interactions, and alternatives were discussed as indicated. I advised her to contact the office if her condition worsens, changes or fails to improve as anticipated. She expressed understanding with the diagnosis(es) and plan    HISTORY OF PRESENTING ILLNESS      Kristie Muniz presented with acute type A aortic dissection 2/5/20 to [address] [place] [address] presenting with severe upper back pain but no chest pain. Chest CTA demonstrates aortic dissection extending from aortic root to iliac bifurcation. She underwent urgent surgical repair by Dr. Wendy Barragan w/ a #24 hemashield graft limited to the ascending aorta. Discharged on POD#7. She was last seen by surgical team on 3/9/20 and was doing well. No previous cardiac hx, except HTN. Echo 2 years ago demonstrated \"mild\" leakage of two valves per patient report (echo report not available). Kristie Muniz is a 69 y.o. female reports feeling better since surgery. She reports easy fatigability. She had PT arrive for 4-5 weeks 2x weekly. She now uses 2 lb ankle weights and PT exercises. Her surgical sites are healing well. Her appetite has not recovered, she has been taking miralax for her bowels. She notes mild SOB if she walks hard. She notes sleeping on her right side causes her to cough. Patient denies any exertional chest pain, dyspnea, palpitations, syncope, orthopnea, edema or paroxysmal nocturnal dyspnea. BP at home 130/80 range HR high 80s-low 90s.        ACTIVE PROBLEM LIST     Patient Active Problem List    Diagnosis Date Noted    Essential hypertension 03/30/2020    Nonrheumatic mitral valve regurgitation 03/30/2020    S/P aortic dissection repair 02/05/2020    Aortic dissection ([address] 02/05/2020    Postoperative anemia due to acute blood loss 02/05/2020           PAST MEDICAL HISTORY     Past Medical History:   Diagnosis Date    Hypertension     Mitral valve problem     leaking valve           PAST SURGICAL HISTORY     Past Surgical History:   Procedure Laterality Date    HX OTHER SURGICAL  02/05/2020    Type A Aortic Dissection Repair          ALLERGIES     Allergies   Allergen Reactions    Other Food Hives     Lobster      [place] Anaphylaxis    Alcohol Hives    Honeydew Itching    Ruben Hives    Penicillins Hives and Rash          FAMILY HISTORY     Family History   Family history unknown: Yes    negative for cardiac disease       SOCIAL HISTORY     Social History     Socioeconomic History    Marital status:      Spouse name: Not on file    Number of children: Not on file    Years of education: Not on file    Highest education level: Not on file   Tobacco Use    Smoking status: Never Smoker    Smokeless tobacco: Never Used   Substance and Sexual Activity    Alcohol use: Never     Frequency: Never    Drug use: Never         MEDICATIONS     Current Outpatient Medications   Medication Sig    metoprolol succinate (TOPROL-XL) 50 mg XL tablet Take 1 Tab by mouth daily.  polyethylene glycol (MIRALAX) 17 gram/dose powder Take 17 g by mouth daily.  losartan (COZAAR) 25 mg tablet Take  by mouth daily.  SENNA-S 8.6-50 mg per tablet TK 1 T PO  IN THE EVENING PRN    acetaminophen (TYLENOL) 325 mg tablet Take 2 Tabs by mouth every six (6) hours as needed for Pain or Fever.  pravastatin (PRAVACHOL) 20 mg tablet Take 20 mg by mouth daily. No current facility-administered medications for this visit. I have reviewed the nurses notes, vitals, problem list, allergy list, medical history, family, social history and medications. REVIEW OF SYMPTOMS      General: Pt denies excessive weight gain or loss. Pt is able to conduct ADL's  HEENT: Denies blurred vision, headaches, hearing loss, epistaxis and difficulty swallowing. Respiratory: Denies cough, congestion, shortness of breath, MARX, wheezing or stridor.   Cardiovascular: Denies precordial pain, palpitations, edema or PND  Gastrointestinal: Denies poor appetite, indigestion, abdominal pain or blood in stool  Genitourinary: Denies hematuria, dysuria, increased urinary frequency  Musculoskeletal: Denies joint pain or swelling from muscles or joints  Neurologic: Denies tremor, paresthesias, headache, or sensory motor disturbance  Psychiatric: Denies confusion, insomnia, depression  Integumentray: Denies rash, itching or ulcers. Hematologic: Denies easy bruising, bleeding       PHYSICAL EXAMINATION      Due to this being a TeleHealth evaluation, many elements of the physical examination are unable to be assessed. General: Well developed, in no acute distress, cooperative and alert  HEENT: Pupils equal/round. No marked JVD visible on video. Respiratory: No audible wheezing, no signs of respiratory distress, lips non cyanotic  Extremities:  No edema  Neuro: A&Ox3, speech clear, no facial droop, answering questions appropriately  Skin: Skin color is normal. No rashes or lesions. Non diaphoretic on visible skin during exam       DIAGNOSTIC DATA      No specialty comments available. LABORATORY DATA      Lab Results   Component Value Date/Time    WBC 10.8 02/12/2020 05:01 AM    HGB 8.4 (L) 02/12/2020 05:01 AM    HCT 27.0 (L) 02/12/2020 05:01 AM    PLATELET 154 29/31/5110 05:01 AM    MCV 96.8 02/12/2020 05:01 AM      Lab Results   Component Value Date/Time    Sodium 140 02/12/2020 05:01 AM    Potassium 3.9 02/12/2020 05:01 AM    Chloride 109 (H) 02/12/2020 05:01 AM    CO2 28 02/12/2020 05:01 AM    Anion gap 3 (L) 02/12/2020 05:01 AM    Glucose 116 (H) 02/12/2020 05:01 AM    BUN 11 02/12/2020 05:01 AM    Creatinine 0.83 02/12/2020 05:01 AM    BUN/Creatinine ratio 13 02/12/2020 05:01 AM    GFR est AA >60 02/12/2020 05:01 AM    GFR est non-AA >60 02/12/2020 05:01 AM    Calcium 8.9 02/12/2020 05:01 AM    Bilirubin, total 0.6 02/07/2020 04:00 AM    AST (SGOT) 26 02/07/2020 04:00 AM    Alk.  phosphatase 32 (L) 02/07/2020 04:00 AM    Protein, total 4.7 (L) 02/07/2020 04:00 AM    Albumin 2.7 (L) 02/07/2020 04:00 AM    Globulin 2.0 02/07/2020 04:00 AM    A-G Ratio 1.4 02/07/2020 04:00 AM    ALT (SGPT) 17 02/07/2020 04:00 AM             FOLLOW-UP     Follow-up and Dispositions    · Return in about 3 months (around 6/30/2020). Patient was made aware and verbalized understanding that an appointment will be scheduled for them for a virtual visit and/or office visit within the above time frame. Patient understanding his/her responsibility to call and change time/date if he/she so chooses. Thank you, Rhonda Fischer MD for allowing me to participate in the care of Hiro Graft. Please do not hesitate to contact me for further questions/concerns. Written by Dipti Serrano, as dictated by Sherif Adair M.D. Sherif Adair MD    [facility].  [address] [place] [address], [address] [place] [address], [address] [place] [address], [address] [place] [address], [facility]  [phone] / [phone] Fax  [phone] / [phone] Fax        Greater than 20 minutes was spent in direct video patient care, planning and chart review. This visit was conducted using [employer] telemedicine services.

## 2020-04-10 ENCOUNTER — TELEPHONE (OUTPATIENT)
Dept: CARDIOLOGY CLINIC | Age: 75
End: 2020-04-10

## 2020-04-10 RX ORDER — METOPROLOL SUCCINATE 50 MG/1
50 TABLET, EXTENDED RELEASE ORAL DAILY
Qty: 90 TAB | Refills: 0 | Status: SHIPPED | OUTPATIENT
Start: 2020-04-10 | End: 2020-07-28 | Stop reason: SDUPTHER

## 2020-04-10 RX ORDER — METOPROLOL SUCCINATE 50 MG/1
50 TABLET, EXTENDED RELEASE ORAL DAILY
Qty: 140 TAB | Refills: 1 | Status: SHIPPED | OUTPATIENT
Start: 2020-04-10 | End: 2020-04-10 | Stop reason: SDUPTHER

## 2020-04-10 NOTE — TELEPHONE ENCOUNTER
Katie Beltran stated with [facility] increased metoprolol 37.5mg daily per . Daily VS  4/8  132/81   HR 86  o2 sat 95%  4/9   132/79         80             94%  4/10  134/87        83             95%      Sent refill for metoprolol with new dose dose. Requested Prescriptions     Pending Prescriptions Disp Refills    metoprolol succinate (TOPROL-XL) 50 mg XL tablet 140 Tab 1     Sig: Take 1 Tab by mouth daily.

## 2020-04-10 NOTE — TELEPHONE ENCOUNTER
Patients  would like to give information to [facility] regarding vitals. He did not want to provide anymore information.      Phone: [phone]

## 2020-06-10 DIAGNOSIS — I71.019 DISSECTION OF THORACIC AORTA: Primary | ICD-10-CM

## 2020-06-18 ENCOUNTER — HOSPITAL ENCOUNTER (OUTPATIENT)
Dept: CT IMAGING | Age: 75
Discharge: HOME | End: 2020-06-18
Attending: NURSE PRACTITIONER
Payer: MEDICARE

## 2020-06-18 DIAGNOSIS — I71.019 DISSECTION OF THORACIC AORTA: ICD-10-CM

## 2020-06-18 LAB — CREAT BLD-MCNC: 0.8 MG/DL (ref 0.6–1.3)

## 2020-06-18 PROCEDURE — 82565 ASSAY OF CREATININE: CPT

## 2020-06-18 PROCEDURE — 71275 CT ANGIOGRAPHY CHEST: CPT

## 2020-06-18 PROCEDURE — 74011000258 HC RX REV CODE- 258: Performed by: RADIOLOGY

## 2020-06-18 PROCEDURE — 74011636320 HC RX REV CODE- 636/320: Performed by: RADIOLOGY

## 2020-06-18 RX ORDER — SODIUM CHLORIDE 0.9 % (FLUSH) 0.9 %
10 SYRINGE (ML) INJECTION
Status: COMPLETED | OUTPATIENT
Start: 2020-06-18 | End: 2020-06-18

## 2020-06-18 RX ADMIN — IOPAMIDOL 100 ML: 755 INJECTION, SOLUTION INTRAVENOUS at 14:33

## 2020-06-18 RX ADMIN — Medication 10 ML: at 14:33

## 2020-06-18 RX ADMIN — SODIUM CHLORIDE 100 ML: 900 INJECTION, SOLUTION INTRAVENOUS at 14:33

## 2020-06-22 ENCOUNTER — TELEPHONE (OUTPATIENT)
Dept: CARDIOLOGY CLINIC | Age: 75
End: 2020-06-22

## 2020-06-22 NOTE — TELEPHONE ENCOUNTER
Called pt about recent Chest CT results. Dr. eLni Liz reviewed results, plan to repeat imaging in 9 months per protocol. \[facility] [place] [facility]\"" office will set it up when time comes. DR. Sheldon Martínez updated pt's brother by phone. Pt stated understanding.

## 2020-07-02 ENCOUNTER — TELEPHONE (OUTPATIENT)
Dept: CARDIOLOGY CLINIC | Age: 75
End: 2020-07-02

## 2020-07-02 NOTE — TELEPHONE ENCOUNTER
[facility] wants to know if patient needs antibiotics prior to dental procedures. Advised that the patient has graft material and will need antibiotics.

## 2020-07-28 ENCOUNTER — OFFICE VISIT (OUTPATIENT)
Dept: CARDIOLOGY CLINIC | Age: 75
End: 2020-07-28

## 2020-07-28 VITALS
DIASTOLIC BLOOD PRESSURE: 90 MMHG | HEART RATE: 76 BPM | HEIGHT: 64 IN | WEIGHT: 131 LBS | BODY MASS INDEX: 22.36 KG/M2 | RESPIRATION RATE: 20 BRPM | OXYGEN SATURATION: 99 % | SYSTOLIC BLOOD PRESSURE: 150 MMHG

## 2020-07-28 DIAGNOSIS — I71.019 DISSECTION OF THORACIC AORTA: ICD-10-CM

## 2020-07-28 DIAGNOSIS — Z98.890 S/P AORTIC DISSECTION REPAIR: ICD-10-CM

## 2020-07-28 DIAGNOSIS — I10 ESSENTIAL HYPERTENSION: Primary | ICD-10-CM

## 2020-07-28 RX ORDER — METOPROLOL SUCCINATE 50 MG/1
50 TABLET, EXTENDED RELEASE ORAL DAILY
Qty: 90 TAB | Refills: 3 | Status: SHIPPED | OUTPATIENT
Start: 2020-07-28

## 2020-07-28 NOTE — PROGRESS NOTES
Berenice Christianson, [address]  [address],  [address]  [place], [address] [facility]    Office [phone]  Fax [phone]  Cell [phone]         Dee Chicas is a 69 y.o. female     Diagnoses  Encounter Diagnoses   Name Primary?  Essential hypertension Yes    Dissection of thoracic aorta (HCC)     S/P aortic dissection repair        Assessment/Recommendations:  Type A Aortic Dissection 2/5/20 s/p repair. Remarkably good recovery. Risk factors include HTN. No evidence of bicuspid pathology (personalized reviewed intraop SLOAN images of AoV). Echo today normal. Recent chest CTA with normal post op appearance  - continue metoprolol XL 50mg/d.      Chronic HTN, controlled on combination therapy. - continue metoprolol for aortic protection as discussed above. No evidence of valvular disease by echo today    Low cardiac risk for dental implants and no known association with aortic dissection. No endocarditis prophylaxis needed. Follow-up and Dispositions    · Return in about 6 months (around 1/28/2021). Subjective:  Feels good. Active, walking regularly. Has regained her muscle tone. Patient denies any exertional chest pain, dyspnea, palpitations, syncope, orthopnea, edema or paroxysmal nocturnal dyspnea. BP log reviewed - 120-130/70s, HR low 70s    Cardiac risk factors   HTN yes  DM  no  Smoking no    .  Father retired radiologist  Moved from [facility] when she was 17      Social History     Socioeconomic History    Marital status:      Spouse name: Not on file    Number of children: Not on file    Years of education: Not on file    Highest education level: Not on file   Occupational History    Not on file   Social Needs    Financial resource strain: Not on file    Food insecurity     Worry: Not on file     Inability: Not on file   [employer] needs     Medical: Not on file     Non-medical: Not on file   Tobacco Use    Smoking status: Never Smoker    Smokeless tobacco: Never Used   Substance and Sexual Activity    Alcohol use: Never     Frequency: Never    Drug use: Never    Sexual activity: Not on file   Lifestyle    Physical activity     Days per week: Not on file     Minutes per session: Not on file    Stress: Not on file   Relationships    Social connections     Talks on phone: Not on file     Gets together: Not on file     Attends [employer] service: Not on file     Active member of club or organization: Not on file     Attends meetings of clubs or organizations: Not on file     Relationship status: Not on file    Intimate partner violence     Fear of current or ex partner: Not on file     Emotionally abused: Not on file     Physically abused: Not on file     Forced sexual activity: Not on file   Other Topics Concern    Not on file   Social History Narrative    Not on file         Cardiac testing  No specialty comments available. Past Medical History:   Diagnosis Date    Hypertension     Mitral valve problem     leaking valve        Current Outpatient Medications   Medication Sig Dispense Refill    metoprolol succinate (TOPROL-XL) 50 mg XL tablet Take 1 Tab by mouth daily. 90 Tab 3    polyethylene glycol (MIRALAX) 17 gram/dose powder Take 17 g by mouth daily.  losartan (COZAAR) 25 mg tablet Take 50 mg by mouth daily.  pravastatin (PRAVACHOL) 20 mg tablet Take 20 mg by mouth daily. Allergies   Allergen Reactions    Other Food Hives     Lobster      [place] Anaphylaxis    Alcohol Hives    Honeydew Itching    Ruben Hives    Penicillins Hives and Rash           Review of Symptoms:  Review of Systems   Constitutional: Negative for chills, fever, malaise/fatigue and weight loss. Eyes: Negative for blurred vision. Respiratory: Negative for cough, hemoptysis and shortness of breath. Cardiovascular: Negative for palpitations, orthopnea, claudication and leg swelling.    Gastrointestinal: Negative for heartburn, melena, nausea and vomiting. Musculoskeletal: Negative for back pain. Neurological: Negative for seizures, loss of consciousness and weakness. Endo/Heme/Allergies: Does not bruise/bleed easily. Physical Exam    Visit Vitals  /90   Pulse 76   Resp 20   Ht 5' 4\" (1.626 m)   Wt 131 lb (59.4 kg)   SpO2 99%   BMI 22.49 kg/m²     General - WDWN, normal weight [facility] woman  HEENT: Eyes without jaundice, Hearing intact  Neck - JVP normal, thyroid nl  Cardiac - normal S1,S2, grade 2 AFSHAN at base. No diastolic murmur. No rubs or gallops. No clicks.  Well healed sternotomy scar  Vascular - carotids without bruits, radials, femorals and pedal pulses equal bilateral  Lungs - clear to auscultation bilaterals, no rales ,wheezing or rhonchi  Abd - soft nontender, no HSM, no abd bruits  Extremities - no edema  Neuro - nonfocal, normal gait  Psych - mood and affect normal    Cardiographics

## 2020-07-28 NOTE — PROGRESS NOTES
Visit Vitals  /90   Pulse 76   Resp 20   Ht 5' 4\" (1.626 m)   Wt 131 lb (59.4 kg)   SpO2 99%   BMI 22.49 kg/m²     Denies CP,SOB,edema  Needs to finish dental procedure

## 2021-03-19 ENCOUNTER — OFFICE VISIT (OUTPATIENT)
Dept: CARDIOLOGY CLINIC | Age: 76
End: 2021-03-19
Payer: MEDICARE

## 2021-03-19 VITALS
SYSTOLIC BLOOD PRESSURE: 156 MMHG | DIASTOLIC BLOOD PRESSURE: 98 MMHG | OXYGEN SATURATION: 97 % | BODY MASS INDEX: 24.07 KG/M2 | HEIGHT: 64 IN | WEIGHT: 141 LBS | HEART RATE: 84 BPM

## 2021-03-19 DIAGNOSIS — I10 ESSENTIAL HYPERTENSION: Primary | ICD-10-CM

## 2021-03-19 DIAGNOSIS — Z98.890 S/P AORTIC DISSECTION REPAIR: ICD-10-CM

## 2021-03-19 DIAGNOSIS — I71.019 DISSECTION OF THORACIC AORTA: Primary | ICD-10-CM

## 2021-03-19 PROCEDURE — 93010 ELECTROCARDIOGRAM REPORT: CPT | Performed by: INTERNAL MEDICINE

## 2021-03-19 PROCEDURE — 1090F PRES/ABSN URINE INCON ASSESS: CPT | Performed by: INTERNAL MEDICINE

## 2021-03-19 PROCEDURE — G0463 HOSPITAL OUTPT CLINIC VISIT: HCPCS | Performed by: INTERNAL MEDICINE

## 2021-03-19 PROCEDURE — G8420 CALC BMI NORM PARAMETERS: HCPCS | Performed by: INTERNAL MEDICINE

## 2021-03-19 PROCEDURE — 99214 OFFICE O/P EST MOD 30 MIN: CPT | Performed by: INTERNAL MEDICINE

## 2021-03-19 PROCEDURE — 93005 ELECTROCARDIOGRAM TRACING: CPT | Performed by: INTERNAL MEDICINE

## 2021-03-19 PROCEDURE — 1101F PT FALLS ASSESS-DOCD LE1/YR: CPT | Performed by: INTERNAL MEDICINE

## 2021-03-19 PROCEDURE — G8753 SYS BP > OR = 140: HCPCS | Performed by: INTERNAL MEDICINE

## 2021-03-19 PROCEDURE — G8400 PT W/DXA NO RESULTS DOC: HCPCS | Performed by: INTERNAL MEDICINE

## 2021-03-19 PROCEDURE — 3017F COLORECTAL CA SCREEN DOC REV: CPT | Performed by: INTERNAL MEDICINE

## 2021-03-19 PROCEDURE — G8536 NO DOC ELDER MAL SCRN: HCPCS | Performed by: INTERNAL MEDICINE

## 2021-03-19 PROCEDURE — G8427 DOCREV CUR MEDS BY ELIG CLIN: HCPCS | Performed by: INTERNAL MEDICINE

## 2021-03-19 PROCEDURE — G8432 DEP SCR NOT DOC, RNG: HCPCS | Performed by: INTERNAL MEDICINE

## 2021-03-19 PROCEDURE — G8755 DIAS BP > OR = 90: HCPCS | Performed by: INTERNAL MEDICINE

## 2021-03-19 NOTE — PROGRESS NOTES
CHLOE Pool  [id number]  [place], [address] [facility]    Office [phone]  Fax [phone]           Alberto Longoria is a 70 y.o. female here for f/u. Diagnoses  Encounter Diagnoses   Name Primary?  Essential hypertension Yes    S/P aortic dissection repair      Assessment/Recommendations:  Type A Aortic Dissection 2/5/20 s/p repair. good recovery. No evidence of bicuspid pathology per review by Dr. Starla Ruiz. Echo in July was normal. chest CTA in June with normal post op appearance. - repeat echo at fu; per pt has plans for repeat CTA with surgery as well in the near future      Chronic HTN, controlled per  log on combination therapy. - continue metoprolol as well as losartan  - reviewed BP control at length with pt give high BP in office   - hx of white coat syndrome     Fu in 6mo or PRN    Follow-up and Dispositions    · Return in about 6 months (around 9/19/2021), or if symptoms worsen or fail to improve. Subjective:  Pt here for 6mo f/u. Feels good. Active, walking regularly. Has regained her muscle tone. Patient denies any exertional chest pain, dyspnea, palpitations, syncope, orthopnea, edema or paroxysmal nocturnal dyspnea. Keep detailed home BP log - reviewed - 120-130/70-80s, HR 70s-80s  Pt reports hx of white coat syndrome- BP always high in office. States she uses 3 diff BP cuffs at home, one which is new.  uses as well. Did have mild increase in BP over the winter - PCP increased losartan to 100mg about 2 weeks ago. Has fu again in May. Cardiac risk factors   HTN yes  DM  no  Smoking no    .  Father retired radiologist  Moved from [facility] when she was 17      Social History     Socioeconomic History    Marital status:      Spouse name: Not on file    Number of children: Not on file    Years of education: Not on file    Highest education level: Not on file   Occupational History    Not on file   Social Needs    Financial resource strain: Not on file    Food insecurity     Worry: Not on file     Inability: Not on file    Transportation needs     Medical: Not on file     Non-medical: Not on file   Tobacco Use    Smoking status: Never Smoker    Smokeless tobacco: Never Used   Substance and Sexual Activity    Alcohol use: Never     Frequency: Never    Drug use: Never    Sexual activity: Not on file   Lifestyle    Physical activity     Days per week: Not on file     Minutes per session: Not on file    Stress: Not on file   Relationships    Social connections     Talks on phone: Not on file     Gets together: Not on file     Attends Che[employer] service: Not on file     Active member of club or organization: Not on file     Attends meetings of clubs or organizations: Not on file     Relationship status: Not on file    Intimate partner violence     Fear of current or ex partner: Not on file     Emotionally abused: Not on file     Physically abused: Not on file     Forced sexual activity: Not on file   Other Topics Concern    Not on file   Social History Narrative    Not on file         Cardiac testing  Echo 7/28/19 - EF 60-65%. G1DD. Trace MR. AoV sclerosis without reduced excursion. Normal post op appearance of ascending aorta.     07/28/20   ECHO ADULT COMPLETE 07/29/2020 7/29/2020    Narrative · LV: Normal cavity size and systolic function (ejection fraction normal). Upper normal wall thickness. Estimated left ventricular ejection fraction   is 60 - 65%. Visually measured ejection fraction. Abnormal left   ventricular septal motion. Interventricular septal \"bounce\". Mild (grade   1) left ventricular diastolic dysfunction. · MV: Trace mitral valve regurgitation is present. · AV: Aortic valve sclerosis with no evidence of reduced excursion.   · Normal post op appearance ascending aorta        Signed by: Jian Ross MD     CT Results (most recent):  Results from [facility] Encounter encounter on 06/18/20   CTA CHEST W OR W WO CONT    Narrative CT ANGIOGRAPHY CHEST. 6/18/2020 2:43 PM     INDICATION: Type A aortic dissection repair, 3 months postop follow-up. COMPARISON: 2/5/2020. TECHNIQUE: CT angiography of the chest was performed after the administration of  100 cc IV contrast (Isovue 370). Coronal and sagittal, and coronal MIP  reconstructions were performed. CT dose reduction was achieved through use of a  standardized protocol tailored for this examination and automatic exposure  control for dose modulation. Adaptive statistical iterative reconstruction  (ASIR) was utilized. FINDINGS:  Vascular: The heart is at the upper limits of normal in size. There are retained  epicardial pacing wires. Post ascending aortic graft repair. Hyperdensity along  the anterior ascending aorta from 8:00 to 2:00 (3-59, 3-57) is likely graft  material. The junction of the graft and the arch is lobulated, with waIsting at  the anastomosis (996-36). The proximal arch measures 33 mm beyond the  anastomosis, the distal arch measures 26 mm, the proximal descending measures 27  mm, and the distal descending measures 24 mm. The arch vessels are normal. The  left vertebral artery is dominant and the right is diminutive. The visualized  abdominal aorta and its branches are normal. An accessory right upper pole renal  artery is a normal variant. Chest: Aside from subsegmental atelectasis along the fissure in the left upper  lobe, the lungs are clear. The central airways are patent. No pneumothorax or  pleural effusion. No thoracic lymphadenopathy. An air-fluid level in the distal  esophagus (3-74) is consistent with reflux. Incidental note is made of an  hemangioma in segment 7 of the liver. A subcentimeter hypodense segment 8  hepatic lesion is too small to characterize, but likely represents a cyst. There  is mild bilateral renal cortical scarring, most conspicuous in the right upper  pole.       Impression IMPRESSION: Post ascending aortic graft repair. Lobulated junction of the graft and the  aortic arch, with waIsting at the anastomosis. Past Medical History:   Diagnosis Date    Hypertension     Mitral valve problem     leaking valve        Current Outpatient Medications   Medication Sig Dispense Refill    metoprolol succinate (TOPROL-XL) 50 mg XL tablet Take 1 Tab by mouth daily. 90 Tab 3    polyethylene glycol (MIRALAX) 17 gram/dose powder Take 17 g by mouth daily.  losartan (COZAAR) 25 mg tablet Take 100 mg by mouth daily.  pravastatin (PRAVACHOL) 20 mg tablet Take 20 mg by mouth daily. Allergies   Allergen Reactions    Other Food Hives     Lobster      [place] Anaphylaxis    Alcohol Hives    Honeydew Itching    [place] Hives    Penicillins Hives and Rash           Review of Symptoms:  (Positive findings above)  Constitutional: Negative for fever, chills, and diaphoresis. Respiratory: Negative for cough, hemoptysis, sputum production, shortness of breath and wheezing. Cardiovascular: Negative for chest pain, palpitations, leg swelling and PND. Gastrointestinal: Negative for nausea, vomiting, blood in stool and melena. Genitourinary: Negative for dysuria and flank pain. Neurological: Negative for focal weakness, seizures, loss of consciousness  Endo/Heme/Allergies: Negative for abnormal bleeding. Psychiatric/Behavioral: Negative for memory loss. Physical Exam    Visit Vitals  BP (!) 156/98 (BP 1 Location: Right arm, BP Patient Position: Sitting)   Pulse 84   Ht 5' 4\" (1.626 m)   Wt 141 lb (64 kg)   SpO2 97%   BMI 24.20 kg/m²     Repeat /96    BP Readings from Last 3 Encounters:   03/19/21 (!) 156/98   07/28/20 150/90   07/28/20 (!) 142/94     General - WDWN, normal weight [facility] woman  HEENT: Eyes without jaundice, Hearing intact  Neck - JVP normal,neck supple  Cardiac - normal S1,S2, RRR.   sternotomy scar  Vascular - carotids without bruits, radials and pedal pulses equal bilateral  Lungs - clear to auscultation bilaterals, no rales ,wheezing or rhonchi  Abd - soft nontender, +BS  Extremities - no edema  Neuro - nonfocal, normal gait  Psych - mood and affect normal    Labs:   Lab Results   Component Value Date/Time    Sodium 140 02/12/2020 05:01 AM    Potassium 3.9 02/12/2020 05:01 AM    Chloride 109 (H) 02/12/2020 05:01 AM    CO2 28 02/12/2020 05:01 AM    Anion gap 3 (L) 02/12/2020 05:01 AM    Glucose 116 (H) 02/12/2020 05:01 AM    BUN 11 02/12/2020 05:01 AM    Creatinine 0.83 02/12/2020 05:01 AM    BUN/Creatinine ratio 13 02/12/2020 05:01 AM    GFR est AA >60 02/12/2020 05:01 AM    GFR est non-AA >60 02/12/2020 05:01 AM    Calcium 8.9 02/12/2020 05:01 AM    Bilirubin, total 0.6 02/07/2020 04:00 AM    Alk.  phosphatase 32 (L) 02/07/2020 04:00 AM    Protein, total 4.7 (L) 02/07/2020 04:00 AM    Albumin 2.7 (L) 02/07/2020 04:00 AM    Globulin 2.0 02/07/2020 04:00 AM    A-G Ratio 1.4 02/07/2020 04:00 AM    ALT (SGPT) 17 02/07/2020 04:00 AM    AST (SGOT) 26 02/07/2020 04:00 AM     Lab Results   Component Value Date/Time    WBC 10.8 02/12/2020 05:01 AM    HGB 8.4 (L) 02/12/2020 05:01 AM    HCT 27.0 (L) 02/12/2020 05:01 AM    PLATELET 042 31/92/3771 05:01 AM    MCV 96.8 02/12/2020 05:01 AM     Lab Results   Component Value Date/Time    Hemoglobin A1c 6.0 (H) 02/06/2020 04:06 AM     Suzanna Acharya, ANP

## 2021-03-19 NOTE — LETTER
3/19/2021 Patient: Annabelle Lauren  
YOB: 1945 Date of Visit: 3/19/2021 Sarika Boss MD 
[address] [address] [address] Fax: [phone] Dear Sarika Boss MD, Thank you for referring Ms. Annabelle Lauren to [facility] for evaluation. My notes for this consultation are attached. If you have questions, please do not hesitate to call me. I look forward to following your patient along with you. Sincerely, Renate Eason MD

## 2021-03-19 NOTE — PATIENT INSTRUCTIONS
No changes Follow-up with Dr. Magdiel Ye again in 6mo with echo same day Call prior if BP routinely >130/80

## 2021-06-22 ENCOUNTER — HOSPITAL ENCOUNTER (OUTPATIENT)
Dept: CT IMAGING | Age: 76
Discharge: HOME | End: 2021-06-22
Attending: NURSE PRACTITIONER
Payer: MEDICARE

## 2021-06-22 DIAGNOSIS — Z98.890 S/P AORTIC DISSECTION REPAIR: ICD-10-CM

## 2021-06-22 DIAGNOSIS — I71.019 DISSECTION OF THORACIC AORTA: ICD-10-CM

## 2021-06-22 LAB — CREAT BLD-MCNC: 0.9 MG/DL (ref 0.6–1.3)

## 2021-06-22 PROCEDURE — 74011000636 HC RX REV CODE- 636: Performed by: NURSE PRACTITIONER

## 2021-06-22 PROCEDURE — 82565 ASSAY OF CREATININE: CPT

## 2021-06-22 PROCEDURE — 71275 CT ANGIOGRAPHY CHEST: CPT

## 2021-06-22 RX ADMIN — IOPAMIDOL 100 ML: 755 INJECTION, SOLUTION INTRAVENOUS at 13:59

## 2021-06-28 ENCOUNTER — TELEPHONE (OUTPATIENT)
Dept: CARDIOLOGY CLINIC | Age: 76
End: 2021-06-28

## 2021-06-28 NOTE — TELEPHONE ENCOUNTER
Pt called, reported chest CT results. Will repeat CTA in 1 year per protocol. PT stated understanding.    Reinforced importance of seeing cardiologist every 6 months and strict BP control

## 2021-09-20 ENCOUNTER — OFFICE VISIT (OUTPATIENT)
Dept: CARDIOLOGY CLINIC | Age: 76
End: 2021-09-20
Payer: MEDICARE

## 2021-09-20 ENCOUNTER — ANCILLARY PROCEDURE (OUTPATIENT)
Dept: CARDIOLOGY CLINIC | Age: 76
End: 2021-09-20
Payer: MEDICARE

## 2021-09-20 VITALS
BODY MASS INDEX: 24.14 KG/M2 | WEIGHT: 141.4 LBS | HEIGHT: 64 IN | DIASTOLIC BLOOD PRESSURE: 90 MMHG | SYSTOLIC BLOOD PRESSURE: 170 MMHG

## 2021-09-20 VITALS
HEART RATE: 77 BPM | HEIGHT: 64 IN | BODY MASS INDEX: 24.14 KG/M2 | WEIGHT: 141.4 LBS | SYSTOLIC BLOOD PRESSURE: 166 MMHG | DIASTOLIC BLOOD PRESSURE: 92 MMHG | OXYGEN SATURATION: 98 %

## 2021-09-20 DIAGNOSIS — I10 ESSENTIAL HYPERTENSION: ICD-10-CM

## 2021-09-20 DIAGNOSIS — Z98.890 S/P AORTIC DISSECTION REPAIR: ICD-10-CM

## 2021-09-20 DIAGNOSIS — I10 ESSENTIAL HYPERTENSION: Primary | ICD-10-CM

## 2021-09-20 DIAGNOSIS — E78.5 DYSLIPIDEMIA: ICD-10-CM

## 2021-09-20 LAB
ECHO AO ASC DIAM: 2.9 CM
ECHO AO ROOT DIAM: 3.78 CM
ECHO AV AREA PEAK VELOCITY: 2.99 CM2
ECHO AV AREA VTI: 3.17 CM2
ECHO AV AREA/BSA PEAK VELOCITY: 1.8 CM2/M2
ECHO AV AREA/BSA VTI: 1.9 CM2/M2
ECHO AV MEAN GRADIENT: 2.18 MMHG
ECHO AV PEAK GRADIENT: 4.47 MMHG
ECHO AV PEAK VELOCITY: 105.67 CM/S
ECHO AV VTI: 18.87 CM
ECHO EST RA PRESSURE: 3 MMHG
ECHO IVC PROX: 1.09 CM
ECHO LA AREA 4C: 15.23 CM2
ECHO LA MAJOR AXIS: 4.2 CM
ECHO LA MINOR AXIS: 2.49 CM
ECHO LA VOL 2C: 35.41 ML (ref 22–52)
ECHO LA VOL 4C: 43.27 ML (ref 22–52)
ECHO LA VOL BP: 41.4 ML (ref 22–52)
ECHO LA VOL/BSA BIPLANE: 24.5 ML/M2 (ref 16–28)
ECHO LA VOLUME INDEX A2C: 20.95 ML/M2 (ref 16–28)
ECHO LA VOLUME INDEX A4C: 25.6 ML/M2 (ref 16–28)
ECHO LV E' LATERAL VELOCITY: 6.66 CM/S
ECHO LV E' SEPTAL VELOCITY: 5.38 CM/S
ECHO LV INTERNAL DIMENSION DIASTOLIC: 4.06 CM (ref 3.9–5.3)
ECHO LV INTERNAL DIMENSION SYSTOLIC: 2.64 CM
ECHO LV IVSD: 1.03 CM (ref 0.6–0.9)
ECHO LV MASS 2D: 122.8 G (ref 67–162)
ECHO LV MASS INDEX 2D: 72.7 G/M2 (ref 43–95)
ECHO LV POSTERIOR WALL DIASTOLIC: 0.89 CM (ref 0.6–0.9)
ECHO LVOT DIAM: 2.14 CM
ECHO LVOT PEAK GRADIENT: 3.09 MMHG
ECHO LVOT PEAK VELOCITY: 87.84 CM/S
ECHO LVOT SV: 59.8 ML
ECHO LVOT VTI: 16.62 CM
ECHO MV A VELOCITY: 89.64 CM/S
ECHO MV E DECELERATION TIME (DT): 372.61 MS
ECHO MV E VELOCITY: 55.48 CM/S
ECHO MV E/A RATIO: 0.62
ECHO MV E/E' LATERAL: 8.33
ECHO MV E/E' RATIO (AVERAGED): 9.32
ECHO MV E/E' SEPTAL: 10.31
ECHO MV MAX VELOCITY: 111.06 CM/S
ECHO MV MEAN GRADIENT: 1.41 MMHG
ECHO MV PEAK GRADIENT: 4.93 MMHG
ECHO MV PRESSURE HALF TIME (PHT): 108.06 MS
ECHO MV VTI: 20.52 CM
ECHO RA AREA 4C: 13.28 CM2
ECHO RIGHT VENTRICULAR SYSTOLIC PRESSURE (RVSP): 24.89 MMHG
ECHO RV INTERNAL DIMENSION: 3.08 CM
ECHO RV TAPSE: 1.59 CM (ref 1.5–2)
ECHO TV REGURGITANT MAX VELOCITY: 233.95 CM/S
ECHO TV REGURGITANT PEAK GRADIENT: 21.89 MMHG

## 2021-09-20 PROCEDURE — 93306 TTE W/DOPPLER COMPLETE: CPT | Performed by: INTERNAL MEDICINE

## 2021-09-20 PROCEDURE — G8427 DOCREV CUR MEDS BY ELIG CLIN: HCPCS | Performed by: INTERNAL MEDICINE

## 2021-09-20 PROCEDURE — G8753 SYS BP > OR = 140: HCPCS | Performed by: INTERNAL MEDICINE

## 2021-09-20 PROCEDURE — G8420 CALC BMI NORM PARAMETERS: HCPCS | Performed by: INTERNAL MEDICINE

## 2021-09-20 PROCEDURE — G8755 DIAS BP > OR = 90: HCPCS | Performed by: INTERNAL MEDICINE

## 2021-09-20 PROCEDURE — 1090F PRES/ABSN URINE INCON ASSESS: CPT | Performed by: INTERNAL MEDICINE

## 2021-09-20 PROCEDURE — G8400 PT W/DXA NO RESULTS DOC: HCPCS | Performed by: INTERNAL MEDICINE

## 2021-09-20 PROCEDURE — 99214 OFFICE O/P EST MOD 30 MIN: CPT | Performed by: INTERNAL MEDICINE

## 2021-09-20 PROCEDURE — G8432 DEP SCR NOT DOC, RNG: HCPCS | Performed by: INTERNAL MEDICINE

## 2021-09-20 PROCEDURE — 1101F PT FALLS ASSESS-DOCD LE1/YR: CPT | Performed by: INTERNAL MEDICINE

## 2021-09-20 PROCEDURE — 3017F COLORECTAL CA SCREEN DOC REV: CPT | Performed by: INTERNAL MEDICINE

## 2021-09-20 PROCEDURE — G8536 NO DOC ELDER MAL SCRN: HCPCS | Performed by: INTERNAL MEDICINE

## 2021-09-20 PROCEDURE — G0463 HOSPITAL OUTPT CLINIC VISIT: HCPCS | Performed by: INTERNAL MEDICINE

## 2021-09-20 NOTE — LETTER
9/20/2021    Patient: Jose Szymanski   YOB: 1945   Date of Visit: 9/20/2021     Sanchez George MD  [address] [facility] [address]  Via Fax: [phone]    Dear Sanchez George MD,      Thank you for referring Ms. Jose Szymanski to [facility] for evaluation. My notes for this consultation are attached. If you have questions, please do not hesitate to call me. I look forward to following your patient along with you.       Sincerely,    Osiel Mancini MD

## 2021-09-20 NOTE — PROGRESS NOTES
Chief Complaint   Patient presents with    Follow-up     Patient presents today for a follow up visit with an Echo    Hypertension     Visit Vitals  BP (!) 166/92 (BP 1 Location: Left upper arm, BP Patient Position: Sitting)   Pulse 77   Ht 5' 4\" (1.626 m)   Wt 141 lb 6.4 oz (64.1 kg)   SpO2 98%   BMI 24.27 kg/m²     Chest pain denied  SOB - denied  Dizziness denied  Swelling/Edema -denied  Recent hospital visit denied

## 2021-09-20 NOTE — PROGRESS NOTES
Estevan Vines MD., [facility]    [address] [place], [address] [facility]    Office [phone],T [phone]           Luke Palomino is here for a f/u office visit. Primary care physician:  Job Bocanegra MD    CC - as documented in EMR    Dear Dr. Job Bocanegra MD    I had the pleasure of seeing Ms. Luke Palomino in the office today. Assessment:     HTN-component of whitecoat hypertension. S/p type A aortic dissection repair 2/2020      Plan:    Home systolic blood pressures well controlled. Continue medications. Continue to follow-up with CT surgery. Echo-normal EF today. On statin. Lipids controlled per patient. Apparently was checked by PCP 3/21. Aggressive cardiovascular risk factor modification. Follow-up 6 months/earlier as needed    Patient understands the plan. All questions were answered to the patient's satisfaction. I appreciate the opportunity to be involved in Ms. Benavides. See note below for details. Please do not hesitate to contact us with questions or concerns. Estevan Vines MD      Cardiac Testing/ Procedures: A. Cardiac Cath/PCI:    B.ECHO/SLOAN:9/19/21 LV: Estimated LVEF is 60 - 65%. Visually measured ejection fraction. Normal cavity size and systolic function (ejection fraction normal). Mild concentric hypertrophy. Mild (grade 1) left ventricular diastolic dysfunction. · RV: Increased wall thickness. · MV: Mitral valve thickening. Trace regurgitation. · TV: Right Ventricular Arterial Pressure (RVSP) is 25 mmHg. · AO: Mildly dilated sinuses of Valsalva; diameter is 3.8 cm (diameter index= 2.2 cm/m2); normal ascending aorta diameter 2.9 cm. · 2/5/2020: Repair of type A aortic dissection with interposition of 24-mm Hemashield tube graft from the sinotubular junction to the origin of the great vessels    C. StressNuclear/Stress ECHO/Stress test:    D.Vascular:    E. EP:    F. Miscellaneous: 2/5/20 Dr Dipesh Powell - .   Repair of type A aortic dissection with interposition of 24-mm Hemashield tube graft from the sinotubular junction to the origin of the great vessels. 2.  Right femoral artery exploration, cannulation of right femoral artery, and repair of right femoral artery. History:     Dodie Shafer is a 78 y.o. female who returns for follow up visit. No CP/dyspnea/swelling LE/palpitaitons    Maintains excellent blood pressure log at home. Systolic blood pressures usually in the 1 20-1 30 range. Sometimes in the morning it can go up to 140s but then decreases after taking her medications. Has been followed by CT surgery; last office visit 6/2021. Underwent CTA of the chest to evaluate aortic dissection repair. . Father retired radiologist  Moved from [facility] when she was 14    ROS:  (bold if positive, if negative)           Medications:       Current Outpatient Medications   Medication Sig Dispense    metoprolol succinate (TOPROL-XL) 50 mg XL tablet Take 1 Tab by mouth daily. 90 Tab    polyethylene glycol (MIRALAX) 17 gram/dose powder Take 17 g by mouth daily.  losartan (COZAAR) 25 mg tablet Take 50 mg by mouth daily.  pravastatin (PRAVACHOL) 20 mg tablet Take 20 mg by mouth daily. No current facility-administered medications for this visit.            Family History of CAD:    No    Social History:  Current  Smoker  No    Physical Exam:     Visit Vitals  BP (!) 166/92 (BP 1 Location: Left upper arm, BP Patient Position: Sitting)   Pulse 77   Ht 5' 4\" (1.626 m)   Wt 141 lb 6.4 oz (64.1 kg)   SpO2 98%   BMI 24.27 kg/m²          Gen: Well-developed, well-nourished, in no acute distress  Neck: Supple,No JVD, No Carotid Bruit,   Resp: No accessory muscle use, Clear breath sounds, No rales or rhonchi  Card: Regular Rate,Rythm,Normal S1, S2, 2/6 systolic murmur present  Abd:  Soft, non-tender, non-distended,BS+,   MSK: No cyanosis  Skin: No rashes    Neuro: moving all four extremities , follows commands appropriately  Psych:  Good insight, oriented to person, place , alert, Nml Affect  LE: No edema    EKG:       Medication Side Effects and Warnings were discussed with patient: yes  Patient Labs were reviewed and/or requested:  yes  Patient Past Records were reviewed and/or requested: yes    Total time :        mins    ATTENTION:   This medical record was transcribed using an electronic medical records/speech recognition system. Although proofread, it may and can contain electronic, spelling and other errors. Corrections may be executed at a later time. Please feel free to contact us for any clarifications as needed.       Lakeshia Abdalla MD

## 2022-03-19 PROBLEM — I10 ESSENTIAL HYPERTENSION: Status: ACTIVE | Noted: 2020-03-30

## 2022-03-19 PROBLEM — I71.00 AORTIC DISSECTION (HCC): Status: ACTIVE | Noted: 2020-02-05

## 2022-03-19 PROBLEM — I34.0 NONRHEUMATIC MITRAL VALVE REGURGITATION: Status: ACTIVE | Noted: 2020-03-30

## 2022-03-19 PROBLEM — Z98.890 S/P AORTIC DISSECTION REPAIR: Status: ACTIVE | Noted: 2020-02-05

## 2022-03-21 ENCOUNTER — OFFICE VISIT (OUTPATIENT)
Dept: CARDIOLOGY CLINIC | Age: 77
End: 2022-03-21
Payer: MEDICARE

## 2022-03-21 VITALS
OXYGEN SATURATION: 96 % | DIASTOLIC BLOOD PRESSURE: 90 MMHG | HEART RATE: 82 BPM | WEIGHT: 140.8 LBS | SYSTOLIC BLOOD PRESSURE: 152 MMHG | BODY MASS INDEX: 24.04 KG/M2 | HEIGHT: 64 IN

## 2022-03-21 DIAGNOSIS — I10 ESSENTIAL HYPERTENSION: Primary | ICD-10-CM

## 2022-03-21 DIAGNOSIS — Z98.890 S/P AORTIC DISSECTION REPAIR: ICD-10-CM

## 2022-03-21 DIAGNOSIS — I34.0 NONRHEUMATIC MITRAL VALVE REGURGITATION: ICD-10-CM

## 2022-03-21 DIAGNOSIS — E78.5 DYSLIPIDEMIA: ICD-10-CM

## 2022-03-21 PROCEDURE — G8400 PT W/DXA NO RESULTS DOC: HCPCS | Performed by: INTERNAL MEDICINE

## 2022-03-21 PROCEDURE — G8753 SYS BP > OR = 140: HCPCS | Performed by: INTERNAL MEDICINE

## 2022-03-21 PROCEDURE — 99214 OFFICE O/P EST MOD 30 MIN: CPT | Performed by: INTERNAL MEDICINE

## 2022-03-21 PROCEDURE — G0463 HOSPITAL OUTPT CLINIC VISIT: HCPCS | Performed by: INTERNAL MEDICINE

## 2022-03-21 PROCEDURE — G8427 DOCREV CUR MEDS BY ELIG CLIN: HCPCS | Performed by: INTERNAL MEDICINE

## 2022-03-21 PROCEDURE — 1101F PT FALLS ASSESS-DOCD LE1/YR: CPT | Performed by: INTERNAL MEDICINE

## 2022-03-21 PROCEDURE — 93005 ELECTROCARDIOGRAM TRACING: CPT | Performed by: INTERNAL MEDICINE

## 2022-03-21 PROCEDURE — 93010 ELECTROCARDIOGRAM REPORT: CPT | Performed by: INTERNAL MEDICINE

## 2022-03-21 PROCEDURE — G8510 SCR DEP NEG, NO PLAN REQD: HCPCS | Performed by: INTERNAL MEDICINE

## 2022-03-21 PROCEDURE — G8420 CALC BMI NORM PARAMETERS: HCPCS | Performed by: INTERNAL MEDICINE

## 2022-03-21 PROCEDURE — 1090F PRES/ABSN URINE INCON ASSESS: CPT | Performed by: INTERNAL MEDICINE

## 2022-03-21 PROCEDURE — G8754 DIAS BP LESS 90: HCPCS | Performed by: INTERNAL MEDICINE

## 2022-03-21 PROCEDURE — G8536 NO DOC ELDER MAL SCRN: HCPCS | Performed by: INTERNAL MEDICINE

## 2022-03-21 PROCEDURE — 3017F COLORECTAL CA SCREEN DOC REV: CPT | Performed by: INTERNAL MEDICINE

## 2022-03-21 RX ORDER — LOSARTAN POTASSIUM 50 MG/1
TABLET ORAL
Qty: 135 TABLET | Refills: 3 | Status: SHIPPED | OUTPATIENT
Start: 2022-03-21

## 2022-03-21 NOTE — PROGRESS NOTES
Heather Briggs MD., [facility]    [address] [place], [address] [facility]    Office [phone],Z [phone]           Christiane Wells is here for a f/u office visit. Primary care physician:  Sujey Wade MD    CC - as documented in EMR    Dear Dr. Sujey Wade MD    I had the pleasure of seeing Ms. Christiane Wells in the office today. Assessment:     HTN-component of whitecoat hypertension. S/p type A aortic dissection repair 2/2020      Plan:    Home systolic blood pressures reviewed. Systolic blood pressure usually in the 130s. It is slightly higher in the mornings. Previously when she tried to take losartan 100 mg, she had side effects. She is willing to try losartan 75 mg daily. She will take 25 mg in the morning and 50mg in the evening. Continue to follow-up with CT surgery. On statin. Lipids controlled per patient. Apparently was checked by PCP 3/21. Aggressive cardiovascular risk factor modification. Follow-up 6 months/earlier as needed. ECHO same visit    Patient understands the plan. All questions were answered to the patient's satisfaction. I appreciate the opportunity to be involved in Ms. Benavides. See note below for details. Please do not hesitate to contact us with questions or concerns. Heather Briggs MD      Cardiac Testing/ Procedures: A. Cardiac Cath/PCI:    B.ECHO/SLOAN:9/19/21 LV: Estimated LVEF is 60 - 65%. Visually measured ejection fraction. Normal cavity size and systolic function (ejection fraction normal). Mild concentric hypertrophy. Mild (grade 1) left ventricular diastolic dysfunction. · RV: Increased wall thickness. · MV: Mitral valve thickening. Trace regurgitation. · TV: Right Ventricular Arterial Pressure (RVSP) is 25 mmHg. · AO: Mildly dilated sinuses of Valsalva; diameter is 3.8 cm (diameter index= 2.2 cm/m2); normal ascending aorta diameter 2.9 cm.   · 2/5/2020: Repair of type A aortic dissection with interposition of 24-mm Hemashield tube graft from the sinotubular junction to the origin of the great vessels    C. StressNuclear/Stress ECHO/Stress test:    D.Vascular:    E. EP:    F. Miscellaneous: 2/5/20 Dr Mendoza Richter - . Repair of type A aortic dissection with interposition of 24-mm Hemashield tube graft from the sinotubular junction to the origin of the great vessels. 2.  Right femoral artery exploration, cannulation of right femoral artery, and repair of right femoral artery. History:     Alvin Alcala is a 78 y.o. female who returns for follow up visit. No CP/dyspnea/swelling LE/palpitaitons    Maintains excellent blood pressure log at home. Systolic blood pressures usually in the 1 20-1 30 range. Sometimes in the morning it can go up to 140s but then decreases after taking her medications. Has been followed by CT surgery; last office visit 6/2021. Underwent CTA of the chest to evaluate aortic dissection repair. . Father retired radiologist  Moved from [facility] when she was 14    ROS:  (bold if positive, if negative)           Medications:       Current Outpatient Medications   Medication Sig Dispense    metoprolol succinate (TOPROL-XL) 50 mg XL tablet Take 1 Tab by mouth daily. 90 Tab    polyethylene glycol (MIRALAX) 17 gram/dose powder Take 17 g by mouth daily.  losartan (COZAAR) 50 mg tablet Take 50 mg by mouth daily.  pravastatin (PRAVACHOL) 20 mg tablet Take 20 mg by mouth daily. No current facility-administered medications for this visit.            Family History of CAD:    No    Social History:  Current  Smoker  No    Physical Exam:     Visit Vitals  BP (!) 152/90 (BP 1 Location: Left upper arm, BP Patient Position: Supine)   Pulse 82   Ht 5' 4\" (1.626 m)   Wt 140 lb 12.8 oz (63.9 kg)   SpO2 96%   BMI 24.17 kg/m²          Gen: Well-developed, well-nourished, in no acute distress  Neck: Supple,No JVD, No Carotid Bruit,   Resp: No accessory muscle use, Clear breath sounds, No rales or rhonchi  Card: Regular Rate,Rythm,Normal S1, S2, 2/6 systolic murmur present  Abd:  Soft, non-tender, non-distended,BS+,   MSK: No cyanosis  Skin: No rashes    Neuro: moving all four extremities , follows commands appropriately  Psych:  Good insight, oriented to person, place , alert, Nml Affect  LE: No edema    EKG: Sinus rhythm, normal axis, nonspecific ST-T changes      Medication Side Effects and Warnings were discussed with patient: yes  Patient Labs were reviewed and/or requested:  yes  Patient Past Records were reviewed and/or requested: yes    Total time :        mins    ATTENTION:   This medical record was transcribed using an electronic medical records/speech recognition system. Although proofread, it may and can contain electronic, spelling and other errors. Corrections may be executed at a later time. Please feel free to contact us for any clarifications as needed.       Tristen Delacruz MD

## 2022-03-21 NOTE — PROGRESS NOTES
José Vera is a 78 y.o. female    Chief Complaint   Patient presents with    Follow-up     6 month f/u     Hypertension    Cholesterol Problem       Chest pain No    SOB No    Dizziness No    Swelling No    Refills No    Visit Vitals  BP (!) 152/90 (BP 1 Location: Left upper arm, BP Patient Position: Supine)   Pulse 82   Ht 5' 4\" (1.626 m)   Wt 140 lb 12.8 oz (63.9 kg)   SpO2 96%   BMI 24.17 kg/m²       1. Have you been to the ER, urgent care clinic since your last visit? Hospitalized since your last visit? No    2. Have you seen or consulted any other health care providers outside of the [address] [place] [address] since your last visit? Include any pap smears or colon screening.   No

## 2022-03-21 NOTE — LETTER
3/21/2022    Patient: José Vera   YOB: 1945   Date of Visit: 3/21/2022     Shruthi Gaines MD  [address]  Via Fax: [phone]    Dear Shruthi Gaines MD,      Thank you for referring Ms. José Vera to [facility] for evaluation. My notes for this consultation are attached. If you have questions, please do not hesitate to call me. I look forward to following your patient along with you.       Sincerely,    Skyler Castro MD

## 2022-09-19 ENCOUNTER — ANCILLARY PROCEDURE (OUTPATIENT)
Dept: CARDIOLOGY CLINIC | Age: 77
End: 2022-09-19
Payer: MEDICARE

## 2022-09-19 VITALS
BODY MASS INDEX: 23.9 KG/M2 | WEIGHT: 140 LBS | DIASTOLIC BLOOD PRESSURE: 84 MMHG | HEIGHT: 64 IN | SYSTOLIC BLOOD PRESSURE: 138 MMHG

## 2022-09-19 DIAGNOSIS — I10 ESSENTIAL HYPERTENSION: ICD-10-CM

## 2022-09-19 PROCEDURE — 93306 TTE W/DOPPLER COMPLETE: CPT | Performed by: INTERNAL MEDICINE

## 2022-09-26 LAB
ECHO AO ROOT DIAM: 3.8 CM
ECHO AO ROOT INDEX: 2.26 CM/M2
ECHO AV MEAN GRADIENT: 2 MMHG
ECHO AV MEAN VELOCITY: 0.7 M/S
ECHO AV PEAK GRADIENT: 4 MMHG
ECHO AV PEAK VELOCITY: 1 M/S
ECHO AV VELOCITY RATIO: 0.7
ECHO AV VTI: 19.1 CM
ECHO EST RA PRESSURE: 3 MMHG
ECHO LA DIAMETER INDEX: 2.38 CM/M2
ECHO LA DIAMETER: 4 CM
ECHO LA TO AORTIC ROOT RATIO: 1.05
ECHO LA VOL 2C: 32 ML (ref 22–52)
ECHO LA VOL 4C: 33 ML (ref 22–52)
ECHO LA VOL BP: 34 ML (ref 22–52)
ECHO LA VOL/BSA BIPLANE: 20 ML/M2 (ref 16–34)
ECHO LA VOLUME AREA LENGTH: 36 ML
ECHO LA VOLUME INDEX A2C: 19 ML/M2 (ref 16–34)
ECHO LA VOLUME INDEX A4C: 20 ML/M2 (ref 16–34)
ECHO LA VOLUME INDEX AREA LENGTH: 21 ML/M2 (ref 16–34)
ECHO LV E' LATERAL VELOCITY: 7 CM/S
ECHO LV E' SEPTAL VELOCITY: 6 CM/S
ECHO LV EDV A2C: 50 ML
ECHO LV EDV A4C: 41 ML
ECHO LV EDV BP: 46 ML (ref 56–104)
ECHO LV EDV INDEX A4C: 24 ML/M2
ECHO LV EDV INDEX BP: 27 ML/M2
ECHO LV EDV NDEX A2C: 30 ML/M2
ECHO LV EJECTION FRACTION A2C: 59 %
ECHO LV EJECTION FRACTION A4C: 55 %
ECHO LV EJECTION FRACTION BIPLANE: 57 % (ref 55–100)
ECHO LV ESV A2C: 21 ML
ECHO LV ESV A4C: 19 ML
ECHO LV ESV BP: 20 ML (ref 19–49)
ECHO LV ESV INDEX A2C: 13 ML/M2
ECHO LV ESV INDEX A4C: 11 ML/M2
ECHO LV ESV INDEX BP: 12 ML/M2
ECHO LV FRACTIONAL SHORTENING: 32 % (ref 28–44)
ECHO LV INTERNAL DIMENSION DIASTOLE INDEX: 2.44 CM/M2
ECHO LV INTERNAL DIMENSION DIASTOLIC: 4.1 CM (ref 3.9–5.3)
ECHO LV INTERNAL DIMENSION SYSTOLIC INDEX: 1.67 CM/M2
ECHO LV INTERNAL DIMENSION SYSTOLIC: 2.8 CM
ECHO LV IVSD: 1.1 CM (ref 0.6–0.9)
ECHO LV MASS 2D: 151.3 G (ref 67–162)
ECHO LV MASS INDEX 2D: 90.1 G/M2 (ref 43–95)
ECHO LV POSTERIOR WALL DIASTOLIC: 1.1 CM (ref 0.6–0.9)
ECHO LV RELATIVE WALL THICKNESS RATIO: 0.54
ECHO LVOT AV VTI INDEX: 0.81
ECHO LVOT MEAN GRADIENT: 1 MMHG
ECHO LVOT PEAK GRADIENT: 2 MMHG
ECHO LVOT PEAK VELOCITY: 0.7 M/S
ECHO LVOT VTI: 15.4 CM
ECHO MV A VELOCITY: 1.1 M/S
ECHO MV AREA PHT: 2 CM2
ECHO MV E DECELERATION TIME (DT): 387.4 MS
ECHO MV E VELOCITY: 0.57 M/S
ECHO MV E/A RATIO: 0.52
ECHO MV E/E' LATERAL: 8.14
ECHO MV E/E' RATIO (AVERAGED): 8.82
ECHO MV E/E' SEPTAL: 9.5
ECHO MV PRESSURE HALF TIME (PHT): 112.4 MS
ECHO RIGHT VENTRICULAR SYSTOLIC PRESSURE (RVSP): 16 MMHG
ECHO RV INTERNAL DIMENSION: 3.4 CM
ECHO RV TAPSE: 1.7 CM (ref 1.7–?)
ECHO TV REGURGITANT MAX VELOCITY: 1.79 M/S
ECHO TV REGURGITANT PEAK GRADIENT: 13 MMHG

## 2022-09-26 PROCEDURE — 93306 TTE W/DOPPLER COMPLETE: CPT | Performed by: INTERNAL MEDICINE

## 2022-09-27 NOTE — PROGRESS NOTES
Called patient. VM left to return call to office. Per Dr. Murray Common:  Normal pump function/no significant changes from prior echo.    Keep follow-up appointment 10/4/2022

## 2022-10-02 NOTE — PROGRESS NOTES
Rosalio Duncan MD., [facility] [place]    [address] [place], [address] [facility]    Office [phone],[facility] [phone]           Taj Carlos is here for a f/u office visit. Primary care physician:  Vicente Davidson MD    CC - as documented in EMR    Dear Dr. Vicente Davidson MD    I had the pleasure of seeing Ms. Taj Carlos in the office today. Assessment:     HTN-component of whitecoat hypertension. S/p type A aortic dissection repair 2/2020      Plan:      Home blood pressures well controlled. Continues to take losartan 50 in the morning and 25mg in the evening along with metoprolol. On statin. Lipids controlled per patient. Apparently was checked by PCP /2022  Aggressive cardiovascular risk factor modification. For cataract surgery. Can proceed with cataract surgery  Follow-up 6 months/earlier as needed. Patient understands the plan. All questions were answered to the patient's satisfaction. I appreciate the opportunity to be involved in Ms. Benavides. See note below for details. Please do not hesitate to contact us with questions or concerns. Rosalio Duncan MD      Cardiac Testing/ Procedures: A. Cardiac Cath/PCI:    B.ECHO/SLOAN: 9/19/22    Left Ventricle: Normal left ventricular systolic function with a visually estimated EF of 55 - 60%. Left ventricle size is normal. Mildly increased wall thickness. Normal wall motion. Grade I diastolic dysfunction. Mitral Valve: Trace regurgitation. Tricuspid Valve: Trace regurgitation. Aorta: Normal sized ascending aorta. Mildly dilated sinus of Valsalva, 3.8cm.    2/5/2020: Repair of type A aortic dissection with interposition of 24-mm Hemashield tube graft from the sinotubular junction to the origin of the great vessels    9/19/21 LV: Estimated LVEF is 60 - 65%. Visually measured ejection fraction. Normal cavity size and systolic function (ejection fraction normal). Mild concentric hypertrophy.  Mild (grade 1) Subjective:      Patient ID: Elio Tracy is a 82 y.o. male who presents today for:  Chief Complaint   Patient presents with    Follow-up     Pt states that things have been going fine. Pt states no concerns at this time. Pt states that he is going to be going out of town and would like to do physical therapy again while he is gone. HPI 82 a right-handed man with a history of Parkinson's disease. This is optimally treated with carbidopa levodopa 3 times a day. Patient also has primary orthostatic hypotension but his blood pressures remain at around 110. He was on Northera in the past.  This is an option too expensive for him. Not any loss of consciousness  Patient was in the hospital after a fall we were not consulted at that time. Patient is actually doing well otherwise. This is a mechanical fall did not pass out.   Past Medical History:   Diagnosis Date    Actinic keratoses     Aortic valve stenosis, severe 9/20/2013    BPH (benign prostatic hyperplasia)     Cancer ([facility] [address] 04/2018    skin cancer on chest removed    Colon polyp 99/63/4214    Diastolic dysfunction 80/84/7237    Stage 2 by echo    ED (erectile dysfunction) 11/29/2011    Glaucoma     Dr. Janelle Torres Hemochromatosis     History of echocardiogram 02/18/2015    mild AS, mild AR, mild TR, mild MR    [place]    Hypertension     Iron deficiency anemia 4/28/2014    LVH (left ventricular hypertrophy) 9/20/2013    Macular degeneration     bilateral; dry on left and wet on right-Dr. Damaso Krishna on back 5/14/2015    Osteoarthritis cervical spine     Parkinson disease ([facility] [address]     Dr. Saida Kiser     Past Surgical History:   Procedure Laterality Date    BACK SURGERY      CARDIAC CATHETERIZATION  11/09/2017    [facility] TUSHAR ECHOLS MD    CATARACT REMOVAL      COLONOSCOPY  11/2009    single polyp right colon    EYE SURGERY Bilateral     OTHER SURGICAL HISTORY  06/08/15    EXC SUBCU MASS BACK    left ventricular diastolic dysfunction. · RV: Increased wall thickness. · MV: Mitral valve thickening. Trace regurgitation. · TV: Right Ventricular Arterial Pressure (RVSP) is 25 mmHg. · AO: Mildly dilated sinuses of Valsalva; diameter is 3.8 cm (diameter index= 2.2 cm/m2); normal ascending aorta diameter 2.9 cm. · 2/5/2020: Repair of type A aortic dissection with interposition of 24-mm Hemashield tube graft from the sinotubular junction to the origin of the great vessels    C. StressNuclear/Stress ECHO/Stress test:    D.Vascular:    E. EP:    F. Miscellaneous: 2/5/20 Dr Opal Gann - . Repair of type A aortic dissection with interposition of 24-mm Hemashield tube graft from the sinotubular junction to the origin of the great vessels. 2.  Right femoral artery exploration, cannulation of right femoral artery, and repair of right femoral artery. History:     Bismark Pandey is a 78 y.o. female who returns for follow up visit. No CP/dyspnea/swelling LE/palpitaitons    Maintains excellent blood pressure log at home. Systolic blood pressures usually in the 1 20-1 30 range. Whenever she is anxious/takes salt her blood pressure increases. Has been followed by CT surgery; last office visit 6/2021. CTA chest-stable ascending aortic repair. No acute abnormality in the chest.    . 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  Take whole tablet every PM. (Patient taking differently: Take 75 mg by mouth daily. Take 1/2 tablet every AM.  Take whole tablet every PM.  Tatal 75mg tab) 135 Tablet    metoprolol succinate (TOPROL-XL) 50 mg XL tablet Take 1 Tab by mouth daily. 90 Tab    polyethylene glycol (MIRALAX) 17 gram/dose powder Take 17 g by mouth daily.      pravastatin (PRAVACHOL) 20 mg tablet Take 20 mg by (Non-Medical): Not on file   Physical Activity:     Days of Exercise per Week: Not on file    Minutes of Exercise per Session: Not on file   Stress:     Feeling of Stress : Not on file   Social Connections:     Frequency of Communication with Friends and Family: Not on file    Frequency of Social Gatherings with Friends and Family: Not on file    Attends [employer] Services: Not on file    Active Member of [address] [place] [address] or Organizations: Not on file    Attends Club or Organization Meetings: Not on file    Marital Status: Not on file   Intimate Partner Violence:     Fear of Current or Ex-Partner: Not on file    Emotionally Abused: Not on file    Physically Abused: Not on file    Sexually Abused: Not on file   Housing Stability:     Unable to Pay for Housing in the Last Year: Not on file    Number of [place] in the Last Year: Not on file    Unstable Housing in the Last Year: Not on file     No family history on file. Allergies   Allergen Reactions    Cat Hair Extract Itching and Swelling     cats       Current Outpatient Medications   Medication Sig Dispense Refill    acetaminophen (TYLENOL) 325 MG tablet Take 2 tablets by mouth every 6 hours as needed for Pain 112 tablet 0    midodrine (PROAMATINE) 5 MG tablet Take 5 mg by mouth 3 times daily      linaclotide (LINZESS) 290 MCG CAPS capsule Take 290 mcg by mouth every morning (before breakfast)      oxybutynin (DITROPAN XL) 5 MG extended release tablet Take 1 tablet by mouth daily 90 tablet 3    lactobacillus acidophilus (FLORANEX) Take 2 tablets by mouth 3 times daily 120 tablet 1    finasteride (PROSCAR) 5 MG tablet Take 1 tablet by mouth daily 30 tablet 3    Cholecalciferol (VITAMIN D3) 2000 units CAPS Take 1,000 Units by mouth daily      vitamin B-12 (CYANOCOBALAMIN) 1000 MCG tablet Take 1,000 mcg by mouth daily Every other day.       Dorzolamide HCl-Timolol Mal (COSOPT OP) Apply 1 drop to eye 2 times daily      folic acid (FOLVITE) 673 MCG mouth daily. No current facility-administered medications for this visit. Family History of CAD:    No    Social History:  Current  Smoker  No    Physical Exam:     Visit Vitals  /88 (BP 1 Location: Left upper arm, BP Patient Position: Sitting)   Pulse 76   Ht 5' 4\" (1.626 m)   Wt 142 lb (64.4 kg)   SpO2 97%   BMI 24.37 kg/m²            Gen: Well-developed, well-nourished, in no acute distress  Neck: Supple,No JVD, No Carotid Bruit,   Resp: No accessory muscle use, Clear breath sounds, No rales or rhonchi  Card: Regular Rate,Rythm,Normal S1, S2, 2/6 systolic murmur present  Abd:  Soft, non-tender, non-distended,BS+,   MSK: No cyanosis  Skin: No rashes    Neuro: moving all four extremities , follows commands appropriately  Psych:  Good insight, oriented to person, place , alert, Nml Affect  LE: No edema    EKG: Sinus rhythm, normal axis, nonspecific ST-T changes      Medication Side Effects and Warnings were discussed with patient: yes  Patient Labs were reviewed and/or requested:  yes  Patient Past Records were reviewed and/or requested: yes    Total time :        mins    ATTENTION:   This medical record was transcribed using an electronic medical records/speech recognition system. Although proofread, it may and can contain electronic, spelling and other errors. Corrections may be executed at a later time. Please feel free to contact us for any clarifications as needed.       Raeann Kenyon MD tablet Take 1 tablet by mouth daily       carbidopa-levodopa (SINEMET)  MG per tablet Take 1 tablet by mouth 3 times daily      bimatoprost (LUMIGAN) 0.03 % ophthalmic drops Place 1 drop into the left eye nightly. No current facility-administered medications for this visit. Review of Systems   Constitutional: Negative for fever. HENT: Negative for ear pain, tinnitus and trouble swallowing. Eyes: Negative for photophobia and visual disturbance. Respiratory: Negative for choking and shortness of breath. Cardiovascular: Negative for chest pain and palpitations. Gastrointestinal: Negative for nausea and vomiting. Musculoskeletal: Positive for gait problem. Negative for back pain, joint swelling, myalgias, neck pain and neck stiffness. Skin: Negative for color change. Allergic/Immunologic: Negative for food allergies. Neurological: Positive for tremors and weakness. Negative for dizziness, seizures, syncope, facial asymmetry, speech difficulty, light-headedness, numbness and headaches. Psychiatric/Behavioral: Negative for behavioral problems, confusion, hallucinations and sleep disturbance. Objective:   /66 (Site: Left Upper Arm, Position: Sitting, Cuff Size: Medium Adult)   Pulse 83   Wt 154 lb (69.9 kg)   BMI 20.89 kg/m²     Physical Exam  Vitals reviewed. Eyes:      Pupils: Pupils are equal, round, and reactive to light. Cardiovascular:      Rate and Rhythm: Normal rate and regular rhythm. Heart sounds: No murmur heard. Pulmonary:      Effort: Pulmonary effort is normal.      Breath sounds: Normal breath sounds. Abdominal:      General: Bowel sounds are normal.   Musculoskeletal:         General: Normal range of motion. Cervical back: Normal range of motion. Skin:     General: Skin is warm. Neurological:      Mental Status: He is alert and oriented to person, place, and time. Cranial Nerves: No cranial nerve deficit. Sensory: No sensory deficit. Motor: No abnormal muscle tone. Coordination: Coordination normal.      Deep Tendon Reflexes: Reflexes are normal and symmetric. Babinski sign absent on the right side. Babinski sign absent on the left side. Comments: Patient has mild bradykinesia but no tremors are notable. Psychiatric:         Mood and Affect: Mood normal.         CT Head WO Contrast    Result Date: 8/21/2021  CT HEAD WO CONTRAST CLINICAL HISTORY:  fall COMPARISON: June 20, 2020 TECHNIQUE: Multiple unenhanced serial axial images of the brain from the vertex of the skull to the base of the skull were performed. FINDINGS: The ventricles are dilated. Unchanged size configuration. .  No mass. No midline shift. The cisterns are patent. There are white matter and periventricular changes most likely consistent with chronic small vessel disease. No acute intra-axial or extra-axial findings. The visualized osseous structures are unremarkable. There is some patchy opacification the ethmoids. There is mild to moderate mucoperiosteal thickening of the left maxillary sinus. The mastoids are well aerated. Both globes are intact. No gross preseptal or post septal findings. Note is made of a left periocular device. Correlate with patient  surgical history. NO ACUTE INTRA-AXIAL OR EXTRA-AXIAL FINDINGS. All CT scans at this facility use dose modulation, iterative reconstruction, and/or weight based dosing when appropriate to reduce radiation dose to as low as reasonably achievable. CT FACIAL BONES WO CONTRAST    Result Date: 8/21/2021  EXAMINATION: CT FACIAL BONES WO CONTRAST DATE AND TIME:8/21/2021 12:09 PM CLINICAL HISTORY: Acute trauma. Facial pain  fall  COMPARISON: None Technique: Axial 2mm thick contiguous scans of the facial bones and paranasal sinuses were acquired. Coronal and sagittal reformats were generated and reviewed.  All CT scans at this facility use dose modulation, iterative reconstruction, and/or weight based dosing when appropriate to reduce radiation dose to as low as reasonably achievable. FINDINGS       Osseous structures: No acute nasal fracture or significant septal deviation. The remaining osseous structures about the mid face and orbits including the maxilla, zygoma, mandibular, palatine, sphenoid, lacrimal, and orbital bones are intact without fracture or subluxation. The mandible and TMJs are intact. Orbits: Both globes, extraocular muscles, optic nerves and retrobulbar fat appears normal.       Paranasal sinuses: Mucosal thickening left maxillary sinus. NO EVIDENCE FOR ACUTE FRACTURE OR DISLOCATION OF THE FACIAL BONES      CT CHEST WO CONTRAST    Result Date: 8/21/2021  EXAMINATION: CT CHEST WO CONTRAST DATE:8/21/2021 12:09 PM CLINICAL HISTORY: Anterior chest pain. Shortness of breath. Fall, R rib pain  COMPARISON:  June 29, 6371 TECHNIQUE: Helical CT was performed through the chest without IV contrast., All CT scans at this facility use dose modulation, iterative reconstruction, and/or weight based dosing when appropriate to reduce radiation dose to as low as reasonably achievable. Some of this report was completed using Power Scribe 514 [id number] and may include unintended errors with respect to translation of words, typographical errors or grammatical errors which may not have been identified prior to the finalization of this report. FINDINGS:   Lungs: Minimal right base infiltrate/atelectasis. Tiny dependent changes at the left base. Lung fields otherwise clear. Tiny 1-2% right pneumothorax. There is an acute undisplaced fracture through the posterior right ninth rib. Pleura: Small right effusion. Mediastinum :Mediastinum and danilo are unremarkable. Vessels:Thoracic aorta is intact. Bones: Acute right ninth rib fracture.   Other:None     UNDISPLACED POSTERIOR RIGHT NINTH RIB FRACTURE TINY 1-2% RIGHT PNEUMOTHORAX. MINIMAL PLEURAL PROBABLE CHANGES OF THE RIGHT BASE. CT CERVICAL SPINE WO CONTRAST    Result Date: 8/21/2021  EXAMINATION: CT CERVICAL SPINE WO CONTRAST   DATE AND TIME:8/21/2021 11:25 AM CLINICAL HISTORY:Acute posterior neck pain  fall  COMPARISON: April 17, 8196 TECHNIQUE:Helical scanning was performed from the skull base through the remainder of the cervical spine without intravenous contrast. Sagittal and coronal reformats were obtained. The lack of contrast limits CT sensitivity of the soft tissues. All CT scans at this facility use dose modulation, iterative reconstruction, and/or weight based dosing when appropriate to reduce radiation dose to as low as reasonably achievable. FINDINGS   Fracture:No acute fracture. Alignment:  Normal cervical lordosis. No subluxation. No canal stenosis. Dense atlas:Dens atlas relationship is normal. Soft tissues:Negative. Other findings: There is moderate cervical spondylosis with multiple level disc osteophyte changes. The visualized portion of the lung apices show a small right apical pneumothorax. Incidental partially visualized is a small right apical pneumothorax. No acute cervical spine abnormality. CT ABDOMEN PELVIS W IV CONTRAST Additional Contrast? None    Result Date: 8/21/2021  Examination: CT ABDOMEN PELVIS W IV CONTRAST Indication:   fall Technique: Multiple serial axial images was performed through the abdomen and pelvis utilizing 100cc of Isovue 300. Images were reconstructed in the axial and coronal and sagittal planes. Comparison: June 20, 2020. Findings: There are small bibasilar areas atelectasis, patchy infiltrate in the bases right greater than left. There is small bilateral pleural effusions right greater than left. The liver again shows several areas low-attenuation. The largest within the right lobe at the inferolateral aspect measures 3.4 cm. The largest in the left lobe measures 2.6 cm. Likely hepatic cyst. No other focal parenchymal abnormalities. No intrahepatic biliary dilatation. The Gallbladder, spleen, pancreas, adrenals,  are unremarkable. The right kidney shows no significant perinephric stranding. There is an area of low-attenuation is seen within the anterior interpole region of the right kidney measuring 1.9 cm. Probable right parapelvic cyst again noted. There is a small area of low-attenuation seen in the anterior pole region too small to characterize. No hydronephrosis. The left kidney shows a partially exophytic area at the lateral region. 2. 0.1 cm unchanged. There are small areas low-attenuation seen in the anterior pole region and posterior pole region. Unchanged. Likely renal cysts. No hydronephrosis. No bladder calculi. Large and small bowel show no sign of obstruction. The appendix is not visualized. No pericecal stranding. No diverticulitis. No free air. No free fluid. The visualized abdominal aorta is of normal size and caliber. No significant retroperitoneal adenopathy. There is multilevel degenerative changes of the lumbar spine. The field-of-view there is a fracture of the right posterior ninth rib. 1. SMALL BIBASILAR AREAS ATELECTASIS, PATCHY GROUNDGLASS INFILTRATE WITH TRACE TO SMALL BILATERAL PLEURAL EFFUSIONS. 2. MULTIPLE HEPATIC CYST. 3. BILATERAL RENAL CYSTS AS WELL AS RIGHT PARAPELVIC CYSTS. 4. FRACTURE THE POSTERIOR ASPECT OF THE RIGHT NINTH RIB. All CT scans at this facility use dose modulation, iterative reconstruction, and/or weight based dosing when appropriate to reduce radiation dose to as low as reasonably achievable. XR CHEST PORTABLE    Result Date: 8/22/2021  EXAMINATION: XR CHEST PORTABLE. DATE AND TIME:8/22/2021 5:48 AM CLINICAL HISTORY: Shortness of breath   reevaluate right sided PTX (tiny)  COMPARISONS: August 21, 2021  FINDINGS: Equivocal tiny right apical pneumothorax of less than 2%. Minimal patchy opacity at the right base.  No significant change. Lungs otherwise clear. No significant change. XR CHEST PORTABLE    Result Date: 8/21/2021  EXAMINATION: CHEST PORTABLE VIEW  CLINICAL HISTORY:  Right pneumothorax  COMPARISONS: CT scan chest August 21, 2021  FINDINGS: 2 views of the chest is submitted. The cardiac silhouette is of normal size configuration. Pulmonary vascular unremarkable. Right sided trachea. Areas atelectasis, patchy infiltrate right lower lobe. Blunting of the right costophrenic likely reflecting trace pleural. Trace right apical pneumothorax                                                                                   AREAS ATELECTASIS, PATCHY INFILTRATE RIGHT LOWER LOBE. BLUNTING OF THE RIGHT COSTOPHRENIC LIKELY REFLECTING TRACE PLEURAL.  TRACE RIGHT APICAL PNEUMOTHORAX      Lab Results   Component Value Date    WBC 4.6 08/23/2021    RBC 2.37 08/23/2021    RBC 3.79 12/01/2011    HGB 8.6 08/23/2021    HCT 25.4 08/23/2021    .9 08/23/2021    MCH 36.2 08/23/2021    MCHC 33.9 08/23/2021    RDW 16.0 08/23/2021    PLT 92 08/23/2021    MPV 8.1 07/06/2015     Lab Results   Component Value Date     08/23/2021    K 3.6 08/23/2021    CL 99 08/23/2021    CO2 25 08/23/2021    BUN 18 08/23/2021    CREATININE 0.73 08/23/2021    GFRAA >60.0 08/23/2021    LABGLOM >60.0 08/23/2021    GLUCOSE 107 08/23/2021    GLUCOSE 81 12/01/2011    PROT 5.9 08/21/2021    LABALBU 3.8 08/21/2021    LABALBU 4.5 12/01/2011    CALCIUM 7.9 08/23/2021    BILITOT 0.7 08/21/2021    ALKPHOS 49 08/21/2021    AST 15 08/21/2021    ALT <5 08/21/2021     Lab Results   Component Value Date    PROTIME 12.9 08/21/2021    INR 1.0 08/21/2021     Lab Results   Component Value Date    TSH 3.080 06/30/2020    NGDSARYG70 449 10/14/2020    FOLATE 5.4 10/14/2020    FERRITIN 842.5 09/10/2018    IRON 154 11/13/2019    TIBC 318 11/13/2019     Lab Results   Component Value Date    TRIG 45 11/13/2019     11/13/2019    LDLCALC 74 11/13/2019     Lab Results

## 2022-10-04 ENCOUNTER — OFFICE VISIT (OUTPATIENT)
Dept: CARDIOLOGY CLINIC | Age: 77
End: 2022-10-04
Payer: MEDICARE

## 2022-10-04 VITALS
HEART RATE: 76 BPM | OXYGEN SATURATION: 97 % | HEIGHT: 64 IN | WEIGHT: 142 LBS | SYSTOLIC BLOOD PRESSURE: 138 MMHG | DIASTOLIC BLOOD PRESSURE: 88 MMHG | BODY MASS INDEX: 24.24 KG/M2

## 2022-10-04 DIAGNOSIS — E78.5 DYSLIPIDEMIA: ICD-10-CM

## 2022-10-04 DIAGNOSIS — Z98.890 S/P AORTIC DISSECTION REPAIR: ICD-10-CM

## 2022-10-04 DIAGNOSIS — I34.0 NONRHEUMATIC MITRAL VALVE REGURGITATION: ICD-10-CM

## 2022-10-04 DIAGNOSIS — I10 ESSENTIAL HYPERTENSION: Primary | ICD-10-CM

## 2022-10-04 PROCEDURE — G8432 DEP SCR NOT DOC, RNG: HCPCS | Performed by: INTERNAL MEDICINE

## 2022-10-04 PROCEDURE — G8752 SYS BP LESS 140: HCPCS | Performed by: INTERNAL MEDICINE

## 2022-10-04 PROCEDURE — 1123F ACP DISCUSS/DSCN MKR DOCD: CPT | Performed by: INTERNAL MEDICINE

## 2022-10-04 PROCEDURE — G8427 DOCREV CUR MEDS BY ELIG CLIN: HCPCS | Performed by: INTERNAL MEDICINE

## 2022-10-04 PROCEDURE — 99214 OFFICE O/P EST MOD 30 MIN: CPT | Performed by: INTERNAL MEDICINE

## 2022-10-04 PROCEDURE — G8536 NO DOC ELDER MAL SCRN: HCPCS | Performed by: INTERNAL MEDICINE

## 2022-10-04 PROCEDURE — G8400 PT W/DXA NO RESULTS DOC: HCPCS | Performed by: INTERNAL MEDICINE

## 2022-10-04 PROCEDURE — 3017F COLORECTAL CA SCREEN DOC REV: CPT | Performed by: INTERNAL MEDICINE

## 2022-10-04 PROCEDURE — G8754 DIAS BP LESS 90: HCPCS | Performed by: INTERNAL MEDICINE

## 2022-10-04 PROCEDURE — 1090F PRES/ABSN URINE INCON ASSESS: CPT | Performed by: INTERNAL MEDICINE

## 2022-10-04 PROCEDURE — 1101F PT FALLS ASSESS-DOCD LE1/YR: CPT | Performed by: INTERNAL MEDICINE

## 2022-10-04 PROCEDURE — G8420 CALC BMI NORM PARAMETERS: HCPCS | Performed by: INTERNAL MEDICINE

## 2022-10-04 PROCEDURE — G0463 HOSPITAL OUTPT CLINIC VISIT: HCPCS | Performed by: INTERNAL MEDICINE

## 2022-10-04 NOTE — LETTER
10/8/2022    Patient: Leydi Rea   YOB: 1945   Date of Visit: 10/4/2022     Mckay Brady MD  [address]  Via Fax: [phone]    Dear Mckay Brady MD,      Thank you for referring Ms. Leydi Rea to [facility] for evaluation. My notes for this consultation are attached. If you have questions, please do not hesitate to call me. I look forward to following your patient along with you.       Sincerely,    Pa Lockhart MD

## 2022-10-04 NOTE — PROGRESS NOTES
Chief Complaint   Patient presents with    Hypertension    Cholesterol Problem     6 mo      Visit Vitals  /88 (BP 1 Location: Left upper arm, BP Patient Position: Sitting)   Pulse 76   Ht 5' 4\" (1.626 m)   Wt 142 lb (64.4 kg)   SpO2 97%   BMI 24.37 kg/m²     Chest pain denied   SOB denied   Palpitations denied   Swelling in hands/feet denied   Dizziness denied   Recent hospital stays denied   Refills denied

## 2022-11-07 NOTE — PROGRESS NOTES
Castillo Sellers is a 70 y.o. female    Chief Complaint   Patient presents with    Follow-up     6 month f/u, Obie pt    Hypertension       Chest pain No    SOB No    Dizziness No    Swelling No    Refills No    Visit Vitals  BP (!) 156/98 (BP 1 Location: Right arm, BP Patient Position: Sitting)   Pulse 84   Ht 5' 4\" (1.626 m)   Wt 141 lb (64 kg)   SpO2 97%   BMI 24.20 kg/m²       1. Have you been to the ER, urgent care clinic since your last visit? Hospitalized since your last visit? No    2. Have you seen or consulted any other health care providers outside of the [address] [place] [address] since your last visit? Include any pap smears or colon screening.   PCP 3 weeks ago Crescentic Advancement Flap Text: The defect edges were debeveled with a #15 scalpel blade.  Given the location of the defect and the proximity to free margins a crescentic advancement flap was deemed most appropriate.  Using a sterile surgical marker, the appropriate advancement flap was drawn incorporating the defect and placing the expected incisions within the relaxed skin tension lines where possible.    The area thus outlined was incised deep to adipose tissue with a #15 scalpel blade.  The skin margins were undermined to an appropriate distance in all directions utilizing iris scissors.

## 2023-05-05 ENCOUNTER — OFFICE VISIT (OUTPATIENT)
Dept: CARDIOLOGY CLINIC | Age: 78
End: 2023-05-05

## 2023-05-05 VITALS
OXYGEN SATURATION: 98 % | RESPIRATION RATE: 18 BRPM | SYSTOLIC BLOOD PRESSURE: 160 MMHG | HEIGHT: 64 IN | DIASTOLIC BLOOD PRESSURE: 96 MMHG | WEIGHT: 143 LBS | HEART RATE: 75 BPM | BODY MASS INDEX: 24.41 KG/M2

## 2023-05-05 DIAGNOSIS — I10 ESSENTIAL HYPERTENSION: Primary | ICD-10-CM

## 2023-05-05 DIAGNOSIS — Z98.890 S/P AORTIC DISSECTION REPAIR: ICD-10-CM

## 2023-05-05 DIAGNOSIS — I34.0 NONRHEUMATIC MITRAL VALVE REGURGITATION: ICD-10-CM

## 2023-05-05 DIAGNOSIS — E78.5 DYSLIPIDEMIA: ICD-10-CM

## 2023-05-05 RX ORDER — PRAVASTATIN SODIUM 20 MG/1
20 TABLET ORAL DAILY
Qty: 90 TABLET | Refills: 3 | Status: SHIPPED | OUTPATIENT
Start: 2023-05-05

## 2023-05-05 RX ORDER — LOSARTAN POTASSIUM 50 MG/1
50 TABLET ORAL 2 TIMES DAILY
Qty: 180 TABLET | Refills: 3 | Status: SHIPPED | OUTPATIENT
Start: 2023-05-05

## 2023-05-05 RX ORDER — METOPROLOL SUCCINATE 50 MG/1
50 TABLET, EXTENDED RELEASE ORAL DAILY
Qty: 90 TABLET | Refills: 3 | Status: SHIPPED | OUTPATIENT
Start: 2023-05-05

## 2023-05-17 LAB — HBA1C MFR BLD HPLC: 5.8 %

## 2023-12-14 ENCOUNTER — OFFICE VISIT (OUTPATIENT)
Age: 78
End: 2023-12-14
Payer: MEDICARE

## 2023-12-14 VITALS
HEIGHT: 64 IN | OXYGEN SATURATION: 99 % | HEART RATE: 66 BPM | DIASTOLIC BLOOD PRESSURE: 92 MMHG | BODY MASS INDEX: 24.62 KG/M2 | WEIGHT: 144.2 LBS | SYSTOLIC BLOOD PRESSURE: 158 MMHG

## 2023-12-14 DIAGNOSIS — I10 ESSENTIAL (PRIMARY) HYPERTENSION: ICD-10-CM

## 2023-12-14 DIAGNOSIS — I71.010 DISSECTION OF ASCENDING AORTA (HCC): ICD-10-CM

## 2023-12-14 DIAGNOSIS — I10 ESSENTIAL HYPERTENSION: Primary | ICD-10-CM

## 2023-12-14 DIAGNOSIS — I34.0 NONRHEUMATIC MITRAL (VALVE) INSUFFICIENCY: ICD-10-CM

## 2023-12-14 PROCEDURE — G8427 DOCREV CUR MEDS BY ELIG CLIN: HCPCS | Performed by: INTERNAL MEDICINE

## 2023-12-14 PROCEDURE — G8484 FLU IMMUNIZE NO ADMIN: HCPCS | Performed by: INTERNAL MEDICINE

## 2023-12-14 PROCEDURE — 99214 OFFICE O/P EST MOD 30 MIN: CPT | Performed by: INTERNAL MEDICINE

## 2023-12-14 PROCEDURE — 3080F DIAST BP >= 90 MM HG: CPT | Performed by: INTERNAL MEDICINE

## 2023-12-14 PROCEDURE — G8400 PT W/DXA NO RESULTS DOC: HCPCS | Performed by: INTERNAL MEDICINE

## 2023-12-14 PROCEDURE — 1036F TOBACCO NON-USER: CPT | Performed by: INTERNAL MEDICINE

## 2023-12-14 PROCEDURE — 1123F ACP DISCUSS/DSCN MKR DOCD: CPT | Performed by: INTERNAL MEDICINE

## 2023-12-14 PROCEDURE — G8420 CALC BMI NORM PARAMETERS: HCPCS | Performed by: INTERNAL MEDICINE

## 2023-12-14 PROCEDURE — 3077F SYST BP >= 140 MM HG: CPT | Performed by: INTERNAL MEDICINE

## 2023-12-14 PROCEDURE — 1090F PRES/ABSN URINE INCON ASSESS: CPT | Performed by: INTERNAL MEDICINE

## 2023-12-14 RX ORDER — AMLODIPINE BESYLATE 5 MG/1
5 TABLET ORAL DAILY
Qty: 30 TABLET | Refills: 3 | Status: SHIPPED | OUTPATIENT
Start: 2023-12-14

## 2023-12-14 NOTE — PROGRESS NOTES
Chief Complaint   Patient presents with    Hypertension    Follow-up    Other     Aortic dissection repai     Vitals:    12/14/23 0923   BP: (!) 140/90   Site: Left Upper Arm   Position: Sitting   Cuff Size: Medium Adult   Pulse: 66   SpO2: 99%   Weight: 65.4 kg (144 lb 3.2 oz)   Height: 1.626 m (5' 4\")     Denies having any active chest pain  No refills needed at this time  No recent hospitalizations or urgent care visits

## 2023-12-14 NOTE — PROGRESS NOTES
Sotero Giraldo MD., [facility]      [address] [facility] [place]      Office [phone],BDW [phone]                 Odessa Bates is here for a f/u office visit. Primary care physician:   Rajeev Pearson MD      CC - as documented in EMR      Dear Dr. Rajeev Pearson MD      I had the pleasure of seeing Ms. Odessa Bates in the office today. Assessment:        HTN-component of whitecoat hypertension. S/p type A aortic dissection repair 2/2020             Plan:        Home systolic blood pressures in the high 130s to 140s. Already on Cozaar 50 mg twice daily and Toprol-XL 50 mg daily. Heart rate in the 60s. Will add amlodipine 5 mg daily. She will start taking half of the medication initially and then take the 5 mg tablet. On statin. 7/22-, triglycerides 103, HDL 72,    Aggressive cardiovascular risk factor modification. Follow-up 6 months/earlier as needed. Echocardiogram prior to visit      Patient understands the plan. All questions were answered to the patient's satisfaction. I appreciate the opportunity to be involved in Ms. Alexander. See note below for details. Please do not hesitate to contact us with questions or  concerns. Sotero Giraldo MD             Cardiac Testing/ Procedures: A. Cardiac Cath/PCI:      B.ECHO/ROGERIO: 9/19/22     Left Ventricle: Normal left ventricular systolic function with a visually estimated EF of 55 - 60%. Left ventricle size is normal. Mildly increased wall  thickness. Normal wall motion. Grade I diastolic dysfunction. Mitral Valve: Trace regurgitation. Tricuspid Valve: Trace regurgitation. Aorta: Normal sized ascending aorta.  Mildly dilated sinus of Valsalva, 3.8cm.     2/5/2020: Repair of type A aortic dissection with interposition of 24-mm Hemashield tube graft from the sinotubular junction to the origin of the great vessels      9/19/21 LV: Estimated

## 2024-05-01 RX ORDER — METOPROLOL SUCCINATE 50 MG/1
50 TABLET, EXTENDED RELEASE ORAL DAILY
Qty: 90 TABLET | Refills: 3 | Status: SHIPPED | OUTPATIENT
Start: 2024-05-01

## 2024-05-01 RX ORDER — LOSARTAN POTASSIUM 50 MG/1
50 TABLET ORAL 2 TIMES DAILY
Qty: 180 TABLET | Refills: 3 | Status: SHIPPED | OUTPATIENT
Start: 2024-05-01

## 2024-05-01 RX ORDER — PRAVASTATIN SODIUM 20 MG
20 TABLET ORAL DAILY
Qty: 90 TABLET | Refills: 3 | Status: SHIPPED | OUTPATIENT
Start: 2024-05-01

## 2024-05-12 DIAGNOSIS — I10 ESSENTIAL HYPERTENSION: ICD-10-CM

## 2024-05-12 DIAGNOSIS — I10 ESSENTIAL (PRIMARY) HYPERTENSION: ICD-10-CM

## 2024-05-13 RX ORDER — AMLODIPINE BESYLATE 5 MG/1
5 TABLET ORAL DAILY
Qty: 90 TABLET | Refills: 3 | Status: SHIPPED | OUTPATIENT
Start: 2024-05-13

## 2024-06-24 NOTE — PROGRESS NOTES
Enrique Parks MD., [facility]      [address],[facility],[place] [address]      Office [phone],Fax [phone]                 Rosita Card is here for a f/u office visit.      Primary care physician:   Barbara Haynes MD      CC - as documented in EMR      Dear Barbara Cartwright MD      I had the pleasure of seeing Ms.Annie Card in the office today.             Assessment:        HTN-component of whitecoat hypertension.   S/p type A aortic dissection repair 2/2020             Plan:          Continue  Cozaar 50 mg twice daily and Toprol-XL 50 mg daily.  Heart rate in the 60s.  Currently on norvasc 2.5mg daily - inc to 5 mg daily -Home systolic blood pressures in the 130s.   On statin. 6/4/2024     Aggressive cardiovascular risk factor modification.   Follow-up 6 months/earlier as needed.       Patient understands the plan. All questions were answered to the patient's satisfaction.      I appreciate the opportunity to be involved in . See note below for details. Please do not hesitate to contact us with questions or  concerns.      Enrique Parks MD             Cardiac Testing/ Procedures:           A.Cardiac Cath/PCI:      B.ECHO/ROGERIO: 6/27/24    Left Ventricle: Normal left ventricular systolic function with a visually estimated EF of 55 - 60%. Left ventricle size is normal. Increased wall thickness. Findings consistent with mild concentric hypertrophy. Normal wall motion. Abnormal diastolic function.    Aortic Valve: Trileaflet valve.    Aorta: Normal sized ascending aorta. Mildly dilated aortic root. Ao root diameter is 3.8 cm. Ao Root Index is 2.22 cm/m2.    9/19/22     Left Ventricle: Normal left ventricular systolic function with a visually estimated EF of 55 - 60%. Left ventricle size is normal. Mildly increased wall  thickness. Normal wall motion. Grade I diastolic dysfunction.     Mitral Valve: Trace regurgitation.     Tricuspid Valve:

## 2024-06-27 ENCOUNTER — ANCILLARY PROCEDURE (OUTPATIENT)
Age: 79
End: 2024-06-27
Payer: MEDICARE

## 2024-06-27 ENCOUNTER — OFFICE VISIT (OUTPATIENT)
Age: 79
End: 2024-06-27
Payer: MEDICARE

## 2024-06-27 VITALS
HEART RATE: 68 BPM | OXYGEN SATURATION: 98 % | WEIGHT: 146 LBS | RESPIRATION RATE: 18 BRPM | BODY MASS INDEX: 24.92 KG/M2 | SYSTOLIC BLOOD PRESSURE: 158 MMHG | DIASTOLIC BLOOD PRESSURE: 88 MMHG | HEIGHT: 64 IN

## 2024-06-27 VITALS
SYSTOLIC BLOOD PRESSURE: 150 MMHG | BODY MASS INDEX: 24.92 KG/M2 | HEART RATE: 61 BPM | DIASTOLIC BLOOD PRESSURE: 90 MMHG | HEIGHT: 64 IN | WEIGHT: 146 LBS

## 2024-06-27 DIAGNOSIS — I71.010 DISSECTION OF ASCENDING AORTA (HCC): ICD-10-CM

## 2024-06-27 DIAGNOSIS — I34.0 NONRHEUMATIC MITRAL VALVE REGURGITATION: ICD-10-CM

## 2024-06-27 DIAGNOSIS — I10 ESSENTIAL (PRIMARY) HYPERTENSION: Primary | ICD-10-CM

## 2024-06-27 DIAGNOSIS — I10 ESSENTIAL HYPERTENSION: ICD-10-CM

## 2024-06-27 DIAGNOSIS — I10 ESSENTIAL (PRIMARY) HYPERTENSION: ICD-10-CM

## 2024-06-27 DIAGNOSIS — I34.0 NONRHEUMATIC MITRAL (VALVE) INSUFFICIENCY: ICD-10-CM

## 2024-06-27 DIAGNOSIS — Z98.890 S/P AORTIC DISSECTION REPAIR: ICD-10-CM

## 2024-06-27 PROBLEM — I71.00 AORTIC DISSECTION (HCC): Status: RESOLVED | Noted: 2020-02-05 | Resolved: 2024-06-27

## 2024-06-27 PROCEDURE — 93010 ELECTROCARDIOGRAM REPORT: CPT | Performed by: INTERNAL MEDICINE

## 2024-06-27 PROCEDURE — 3079F DIAST BP 80-89 MM HG: CPT | Performed by: INTERNAL MEDICINE

## 2024-06-27 PROCEDURE — 93306 TTE W/DOPPLER COMPLETE: CPT | Performed by: INTERNAL MEDICINE

## 2024-06-27 PROCEDURE — 1036F TOBACCO NON-USER: CPT | Performed by: INTERNAL MEDICINE

## 2024-06-27 PROCEDURE — 1123F ACP DISCUSS/DSCN MKR DOCD: CPT | Performed by: INTERNAL MEDICINE

## 2024-06-27 PROCEDURE — 1090F PRES/ABSN URINE INCON ASSESS: CPT | Performed by: INTERNAL MEDICINE

## 2024-06-27 PROCEDURE — 99214 OFFICE O/P EST MOD 30 MIN: CPT | Performed by: INTERNAL MEDICINE

## 2024-06-27 PROCEDURE — 93005 ELECTROCARDIOGRAM TRACING: CPT | Performed by: INTERNAL MEDICINE

## 2024-06-27 PROCEDURE — G8400 PT W/DXA NO RESULTS DOC: HCPCS | Performed by: INTERNAL MEDICINE

## 2024-06-27 PROCEDURE — G8419 CALC BMI OUT NRM PARAM NOF/U: HCPCS | Performed by: INTERNAL MEDICINE

## 2024-06-27 PROCEDURE — 3077F SYST BP >= 140 MM HG: CPT | Performed by: INTERNAL MEDICINE

## 2024-06-27 PROCEDURE — G8428 CUR MEDS NOT DOCUMENT: HCPCS | Performed by: INTERNAL MEDICINE

## 2024-06-27 NOTE — PROGRESS NOTES
Chief Complaint   Patient presents with    Hypertension    Other     Hx of aortic dissection repair    Follow-up         Chest Pain Some sob and chest pain with walking up a hill or stairs.    Last Lipids 6/4/24     Refills    BP (!) 150/90   Ht 1.626 m (5' 4\")   Wt 66.2 kg (146 lb)   BMI 25.06 kg/m²       Have you been to the ER, urgent care clinic since your last visit? No  Hospitalized since your last visit? NO    2. Have you seen or consulted with any other health care providers outside of the [facility] since your last visit?  No

## 2024-06-30 LAB
ECHO AO ASC DIAM: 2.6 CM
ECHO AO ASCENDING AORTA INDEX: 1.52 CM/M2
ECHO AO ROOT DIAM: 3.8 CM
ECHO AO ROOT INDEX: 2.22 CM/M2
ECHO AV MEAN GRADIENT: 3 MMHG
ECHO AV MEAN VELOCITY: 0.8 M/S
ECHO AV PEAK GRADIENT: 4 MMHG
ECHO AV PEAK VELOCITY: 1 M/S
ECHO AV VELOCITY RATIO: 0.7
ECHO AV VTI: 21.8 CM
ECHO BSA: 1.73 M2
ECHO LA DIAMETER INDEX: 2.28 CM/M2
ECHO LA DIAMETER: 3.9 CM
ECHO LA TO AORTIC ROOT RATIO: 1.03
ECHO LA VOL A-L A2C: 36 ML (ref 22–52)
ECHO LA VOL A-L A4C: 38 ML (ref 22–52)
ECHO LA VOL BP: 36 ML (ref 22–52)
ECHO LA VOL MOD A2C: 34 ML (ref 22–52)
ECHO LA VOL MOD A4C: 34 ML (ref 22–52)
ECHO LA VOL/BSA BIPLANE: 21 ML/M2 (ref 16–34)
ECHO LA VOLUME AREA LENGTH: 39 ML
ECHO LA VOLUME INDEX A-L A2C: 21 ML/M2 (ref 16–34)
ECHO LA VOLUME INDEX A-L A4C: 22 ML/M2 (ref 16–34)
ECHO LA VOLUME INDEX AREA LENGTH: 23 ML/M2 (ref 16–34)
ECHO LA VOLUME INDEX MOD A2C: 20 ML/M2 (ref 16–34)
ECHO LA VOLUME INDEX MOD A4C: 20 ML/M2 (ref 16–34)
ECHO LV E' LATERAL VELOCITY: 8 CM/S
ECHO LV E' SEPTAL VELOCITY: 8 CM/S
ECHO LV EDV A2C: 42 ML
ECHO LV EDV A4C: 41 ML
ECHO LV EDV BP: 43 ML (ref 56–104)
ECHO LV EDV INDEX A4C: 24 ML/M2
ECHO LV EDV INDEX BP: 25 ML/M2
ECHO LV EDV NDEX A2C: 25 ML/M2
ECHO LV EJECTION FRACTION A2C: 67 %
ECHO LV EJECTION FRACTION A4C: 50 %
ECHO LV EJECTION FRACTION BIPLANE: 60 % (ref 55–100)
ECHO LV ESV A2C: 14 ML
ECHO LV ESV A4C: 20 ML
ECHO LV ESV BP: 17 ML (ref 19–49)
ECHO LV ESV INDEX A2C: 8 ML/M2
ECHO LV ESV INDEX A4C: 12 ML/M2
ECHO LV ESV INDEX BP: 10 ML/M2
ECHO LV FRACTIONAL SHORTENING: 33 % (ref 28–44)
ECHO LV INTERNAL DIMENSION DIASTOLE INDEX: 2.34 CM/M2
ECHO LV INTERNAL DIMENSION DIASTOLIC: 4 CM (ref 3.9–5.3)
ECHO LV INTERNAL DIMENSION SYSTOLIC INDEX: 1.58 CM/M2
ECHO LV INTERNAL DIMENSION SYSTOLIC: 2.7 CM
ECHO LV IVSD: 1.2 CM (ref 0.6–0.9)
ECHO LV MASS 2D: 145.6 G (ref 67–162)
ECHO LV MASS INDEX 2D: 85.2 G/M2 (ref 43–95)
ECHO LV POSTERIOR WALL DIASTOLIC: 1 CM (ref 0.6–0.9)
ECHO LV RELATIVE WALL THICKNESS RATIO: 0.5
ECHO LVOT AV VTI INDEX: 0.78
ECHO LVOT MEAN GRADIENT: 1 MMHG
ECHO LVOT PEAK GRADIENT: 2 MMHG
ECHO LVOT PEAK VELOCITY: 0.7 M/S
ECHO LVOT VTI: 17 CM
ECHO MV A VELOCITY: 1.27 M/S
ECHO MV AREA PHT: 2.5 CM2
ECHO MV E DECELERATION TIME (DT): 305.4 MS
ECHO MV E VELOCITY: 0.79 M/S
ECHO MV E/A RATIO: 0.62
ECHO MV E/E' LATERAL: 9.88
ECHO MV E/E' RATIO (AVERAGED): 9.88
ECHO MV E/E' SEPTAL: 9.88
ECHO MV PRESSURE HALF TIME (PHT): 88.6 MS
ECHO RV FREE WALL PEAK S': 11 CM/S
ECHO RV INTERNAL DIMENSION: 3.4 CM
ECHO RV TAPSE: 1.7 CM (ref 1.7–?)

## 2024-06-30 PROCEDURE — 93306 TTE W/DOPPLER COMPLETE: CPT | Performed by: INTERNAL MEDICINE

## 2024-07-01 ENCOUNTER — TELEPHONE (OUTPATIENT)
Age: 79
End: 2024-07-01

## 2024-07-01 DIAGNOSIS — I10 ESSENTIAL (PRIMARY) HYPERTENSION: ICD-10-CM

## 2024-07-01 DIAGNOSIS — I10 ESSENTIAL HYPERTENSION: ICD-10-CM

## 2024-07-01 DIAGNOSIS — E78.5 HYPERLIPIDEMIA, UNSPECIFIED: Primary | ICD-10-CM

## 2024-07-01 DIAGNOSIS — I34.0 NONRHEUMATIC MITRAL VALVE REGURGITATION: ICD-10-CM

## 2024-07-01 RX ORDER — AMLODIPINE BESYLATE 5 MG/1
5 TABLET ORAL DAILY
Qty: 90 TABLET | Refills: 3 | Status: SHIPPED | OUTPATIENT
Start: 2024-07-01

## 2024-07-01 RX ORDER — PRAVASTATIN SODIUM 20 MG
20 TABLET ORAL DAILY
Qty: 90 TABLET | Refills: 3 | Status: SHIPPED | OUTPATIENT
Start: 2024-07-01

## 2024-07-01 RX ORDER — METOPROLOL SUCCINATE 50 MG/1
50 TABLET, EXTENDED RELEASE ORAL DAILY
Qty: 90 TABLET | Refills: 3 | Status: SHIPPED | OUTPATIENT
Start: 2024-07-01

## 2024-07-01 RX ORDER — LOSARTAN POTASSIUM 50 MG/1
50 TABLET ORAL 2 TIMES DAILY
Qty: 180 TABLET | Refills: 3 | Status: SHIPPED | OUTPATIENT
Start: 2024-07-01

## 2024-07-01 NOTE — TELEPHONE ENCOUNTER
Patient is calling because she would like all 4 of her prescriptions to go to [facility].Patient needs a refill for 90 days.    [phone] patient

## 2025-01-17 ENCOUNTER — OFFICE VISIT (OUTPATIENT)
Age: 80
End: 2025-01-17
Payer: MEDICARE

## 2025-01-17 VITALS
HEART RATE: 76 BPM | BODY MASS INDEX: 25.49 KG/M2 | OXYGEN SATURATION: 97 % | WEIGHT: 149.3 LBS | HEIGHT: 64 IN | SYSTOLIC BLOOD PRESSURE: 138 MMHG | DIASTOLIC BLOOD PRESSURE: 84 MMHG

## 2025-01-17 DIAGNOSIS — Z98.890 S/P AORTIC DISSECTION REPAIR: ICD-10-CM

## 2025-01-17 DIAGNOSIS — I10 ESSENTIAL HYPERTENSION: ICD-10-CM

## 2025-01-17 DIAGNOSIS — I34.0 NONRHEUMATIC MITRAL VALVE REGURGITATION: Primary | ICD-10-CM

## 2025-01-17 DIAGNOSIS — I10 ESSENTIAL (PRIMARY) HYPERTENSION: ICD-10-CM

## 2025-01-17 PROCEDURE — G8400 PT W/DXA NO RESULTS DOC: HCPCS | Performed by: INTERNAL MEDICINE

## 2025-01-17 PROCEDURE — 3075F SYST BP GE 130 - 139MM HG: CPT | Performed by: INTERNAL MEDICINE

## 2025-01-17 PROCEDURE — 1159F MED LIST DOCD IN RCRD: CPT | Performed by: INTERNAL MEDICINE

## 2025-01-17 PROCEDURE — 99213 OFFICE O/P EST LOW 20 MIN: CPT | Performed by: INTERNAL MEDICINE

## 2025-01-17 PROCEDURE — 1036F TOBACCO NON-USER: CPT | Performed by: INTERNAL MEDICINE

## 2025-01-17 PROCEDURE — 1123F ACP DISCUSS/DSCN MKR DOCD: CPT | Performed by: INTERNAL MEDICINE

## 2025-01-17 PROCEDURE — 1126F AMNT PAIN NOTED NONE PRSNT: CPT | Performed by: INTERNAL MEDICINE

## 2025-01-17 PROCEDURE — 1090F PRES/ABSN URINE INCON ASSESS: CPT | Performed by: INTERNAL MEDICINE

## 2025-01-17 PROCEDURE — 1160F RVW MEDS BY RX/DR IN RCRD: CPT | Performed by: INTERNAL MEDICINE

## 2025-01-17 PROCEDURE — G8419 CALC BMI OUT NRM PARAM NOF/U: HCPCS | Performed by: INTERNAL MEDICINE

## 2025-01-17 PROCEDURE — 3079F DIAST BP 80-89 MM HG: CPT | Performed by: INTERNAL MEDICINE

## 2025-01-17 PROCEDURE — G8427 DOCREV CUR MEDS BY ELIG CLIN: HCPCS | Performed by: INTERNAL MEDICINE

## 2025-01-17 NOTE — PROGRESS NOTES
Enrique Parks MD., [facility]      [address],[facility],[place] [address]      Office [phone],Fax [phone]                 Rosita Card is here for a f/u office visit.      Primary care physician:   Barbara Haynes MD      CC - as documented in EMR      Dear Barbara Cartwright MD      I had the pleasure of seeing Ms.Annie Card in the office today.             Assessment:        HTN-component of whitecoat hypertension.   S/p type A aortic dissection repair 2/2020             Plan:          Continue  Cozaar 50 mg twice daily and Toprol-XL 50 mg daily/Norvasc 5mg daily.  Heart rate in the 60s.   On statin. 6/4/2024     Aggressive cardiovascular risk factor modification.  Ca score   Follow-up 6 months/earlier as needed.       Patient understands the plan. All questions were answered to the patient's satisfaction.      I appreciate the opportunity to be involved in . See note below for details. Please do not hesitate to contact us with questions or  concerns.      Enrique Parks MD             Cardiac Testing/ Procedures:           A.Cardiac Cath/PCI:      B.ECHO/ROGERIO: 6/27/24    Left Ventricle: Normal left ventricular systolic function with a visually estimated EF of 55 - 60%. Left ventricle size is normal. Increased wall thickness. Findings consistent with mild concentric hypertrophy. Normal wall motion. Abnormal diastolic function.    Aortic Valve: Trileaflet valve.    Aorta: Normal sized ascending aorta. Mildly dilated aortic root. Ao root diameter is 3.8 cm. Ao Root Index is 2.22 cm/m2.    9/19/22     Left Ventricle: Normal left ventricular systolic function with a visually estimated EF of 55 - 60%. Left ventricle size is normal. Mildly increased wall  thickness. Normal wall motion. Grade I diastolic dysfunction.     Mitral Valve: Trace regurgitation.     Tricuspid Valve: Trace regurgitation.     Aorta: Normal sized ascending aorta. Mildly

## 2025-01-17 NOTE — PROGRESS NOTES
Chief Complaint   Patient presents with    Hypertension    Other     Aortic Dissection Repair      Vitals:    01/17/25 1325   BP: 138/84   Site: Left Upper Arm   Position: Sitting   Pulse: 76   SpO2: 97%   Weight: 67.7 kg (149 lb 4.8 oz)   Height: 1.626 m (5' 4\")         Chest pain: DENIED     Recent hospital stays: DENIED     Refills: DENIED

## 2025-03-11 ENCOUNTER — HOSPITAL ENCOUNTER (OUTPATIENT)
Facility: HOSPITAL | Age: 80
Discharge: HOME | End: 2025-03-14
Attending: INTERNAL MEDICINE

## 2025-03-11 DIAGNOSIS — I10 ESSENTIAL (PRIMARY) HYPERTENSION: ICD-10-CM

## 2025-03-11 DIAGNOSIS — I34.0 NONRHEUMATIC MITRAL VALVE REGURGITATION: ICD-10-CM

## 2025-03-11 DIAGNOSIS — Z98.890 S/P AORTIC DISSECTION REPAIR: ICD-10-CM

## 2025-03-11 DIAGNOSIS — I10 ESSENTIAL HYPERTENSION: ICD-10-CM

## 2025-03-11 PROCEDURE — 75571 CT HRT W/O DYE W/CA TEST: CPT

## 2025-03-16 ENCOUNTER — RESULTS FOLLOW-UP (OUTPATIENT)
Facility: HOSPITAL | Age: 80
End: 2025-03-16

## 2025-07-24 ENCOUNTER — OFFICE VISIT (OUTPATIENT)
Age: 80
End: 2025-07-24
Payer: MEDICARE

## 2025-07-24 VITALS
BODY MASS INDEX: 25.27 KG/M2 | OXYGEN SATURATION: 97 % | RESPIRATION RATE: 16 BRPM | HEIGHT: 64 IN | DIASTOLIC BLOOD PRESSURE: 70 MMHG | SYSTOLIC BLOOD PRESSURE: 128 MMHG | WEIGHT: 148 LBS | HEART RATE: 71 BPM

## 2025-07-24 DIAGNOSIS — I10 ESSENTIAL HYPERTENSION: Primary | ICD-10-CM

## 2025-07-24 PROCEDURE — 1090F PRES/ABSN URINE INCON ASSESS: CPT | Performed by: INTERNAL MEDICINE

## 2025-07-24 PROCEDURE — 93010 ELECTROCARDIOGRAM REPORT: CPT | Performed by: INTERNAL MEDICINE

## 2025-07-24 PROCEDURE — 1159F MED LIST DOCD IN RCRD: CPT | Performed by: INTERNAL MEDICINE

## 2025-07-24 PROCEDURE — 3078F DIAST BP <80 MM HG: CPT | Performed by: INTERNAL MEDICINE

## 2025-07-24 PROCEDURE — 1123F ACP DISCUSS/DSCN MKR DOCD: CPT | Performed by: INTERNAL MEDICINE

## 2025-07-24 PROCEDURE — 1160F RVW MEDS BY RX/DR IN RCRD: CPT | Performed by: INTERNAL MEDICINE

## 2025-07-24 PROCEDURE — 93005 ELECTROCARDIOGRAM TRACING: CPT | Performed by: INTERNAL MEDICINE

## 2025-07-24 PROCEDURE — G8419 CALC BMI OUT NRM PARAM NOF/U: HCPCS | Performed by: INTERNAL MEDICINE

## 2025-07-24 PROCEDURE — 99213 OFFICE O/P EST LOW 20 MIN: CPT | Performed by: INTERNAL MEDICINE

## 2025-07-24 PROCEDURE — 3074F SYST BP LT 130 MM HG: CPT | Performed by: INTERNAL MEDICINE

## 2025-07-24 PROCEDURE — 1126F AMNT PAIN NOTED NONE PRSNT: CPT | Performed by: INTERNAL MEDICINE

## 2025-07-24 PROCEDURE — G8400 PT W/DXA NO RESULTS DOC: HCPCS | Performed by: INTERNAL MEDICINE

## 2025-07-24 PROCEDURE — 1036F TOBACCO NON-USER: CPT | Performed by: INTERNAL MEDICINE

## 2025-07-24 PROCEDURE — G8427 DOCREV CUR MEDS BY ELIG CLIN: HCPCS | Performed by: INTERNAL MEDICINE

## 2025-07-24 NOTE — PROGRESS NOTES
had concerns including Hypertension.    Vitals:    07/24/25 1308   BP: 128/70   BP Site: Left Upper Arm   Patient Position: Sitting   Pulse: 71   Resp: 16   SpO2: 97%   Weight: 67.1 kg (148 lb)   Height: 1.626 m (5' 4\")        Chest pain No    Refills No        1. Have you been to the ER, urgent care clinic since your last visit? No       Hospitalized since your last visit? No       Where?        Date?

## 2025-07-24 NOTE — PROGRESS NOTES
Enrique Parks MD., [facility]      [address],[facility],[place] [address]      Office [phone],Fax [phone]                 Rosita Card is here for a f/u office visit.      Primary care physician:   Barbara Haynes MD      CC - as documented in EMR      Dear Barbara Cartwright MD      I had the pleasure of seeing Ms.Annie Card in the office today.             Assessment:        HTN-component of whitecoat hypertension.   S/p type A aortic dissection repair 2/2020             Plan:          Continue  Cozaar 50 mg twice daily and Toprol-XL 50 mg daily/Norvasc 5mg daily.  Heart rate in the 60s.   On statin. 6/4/2024    3/2025-coronary calcium score 91   Aggressive cardiovascular risk factor modification.    Follow-up 6 months/earlier as needed.       Patient understands the plan. All questions were answered to the patient's satisfaction.      I appreciate the opportunity to be involved in . See note below for details. Please do not hesitate to contact us with questions or  concerns.      Enrique Parks MD             Cardiac Testing/ Procedures:           A.Cardiac Cath/PCI:      B.ECHO/ROGERIO: 6/27/24    Left Ventricle: Normal left ventricular systolic function with a visually estimated EF of 55 - 60%. Left ventricle size is normal. Increased wall thickness. Findings consistent with mild concentric hypertrophy. Normal wall motion. Abnormal diastolic function.    Aortic Valve: Trileaflet valve.    Aorta: Normal sized ascending aorta. Mildly dilated aortic root. Ao root diameter is 3.8 cm. Ao Root Index is 2.22 cm/m2.    9/19/22     Left Ventricle: Normal left ventricular systolic function with a visually estimated EF of 55 - 60%. Left ventricle size is normal. Mildly increased wall  thickness. Normal wall motion. Grade I diastolic dysfunction.     Mitral Valve: Trace regurgitation.     Tricuspid Valve: Trace regurgitation.     Aorta: Normal sized

## 2025-08-17 DIAGNOSIS — E78.5 HYPERLIPIDEMIA, UNSPECIFIED: ICD-10-CM

## 2025-08-17 DIAGNOSIS — I10 ESSENTIAL HYPERTENSION: ICD-10-CM

## 2025-08-17 DIAGNOSIS — I34.0 NONRHEUMATIC MITRAL VALVE REGURGITATION: ICD-10-CM

## 2025-08-17 DIAGNOSIS — I10 ESSENTIAL (PRIMARY) HYPERTENSION: ICD-10-CM

## 2025-08-20 RX ORDER — LOSARTAN POTASSIUM 50 MG/1
50 TABLET ORAL 2 TIMES DAILY
Qty: 180 TABLET | Refills: 3 | Status: SHIPPED | OUTPATIENT
Start: 2025-08-20

## 2025-08-20 RX ORDER — METOPROLOL SUCCINATE 50 MG/1
50 TABLET, EXTENDED RELEASE ORAL DAILY
Qty: 90 TABLET | Refills: 3 | Status: SHIPPED | OUTPATIENT
Start: 2025-08-20

## 2025-08-20 RX ORDER — PRAVASTATIN SODIUM 20 MG
20 TABLET ORAL DAILY
Qty: 90 TABLET | Refills: 3 | Status: SHIPPED | OUTPATIENT
Start: 2025-08-20

## 2025-08-20 RX ORDER — AMLODIPINE BESYLATE 5 MG/1
5 TABLET ORAL DAILY
Qty: 90 TABLET | Refills: 3 | Status: SHIPPED | OUTPATIENT
Start: 2025-08-20

## (undated) DEVICE — STERILE POLYISOPRENE POWDER-FREE SURGICAL GLOVES WITH EMOLLIENT COATING: Brand: PROTEXIS

## (undated) DEVICE — 6 FOOT DISPOSABLE EXTENSION CABLE WITH SAFE CONNECT / SCREW-DOWN

## (undated) DEVICE — CANNULA PERF 15FR L12.5IN RG STPCOCK WIREWOUND BODY

## (undated) DEVICE — SYR 3ML LL TIP 1/10ML GRAD --

## (undated) DEVICE — (D)PREP SKN CHLRAPRP APPL 26ML -- CONVERT TO ITEM 371833

## (undated) DEVICE — 1000ML,PRESSURE INFUSER W/STOPCOCK: Brand: MEDLINE

## (undated) DEVICE — KIT CANN 19FR TIP L18CM VENT CONN 3/8IN ART POLYUR PERC

## (undated) DEVICE — SOLUTION IV 500ML 0.9% SOD CHL FLX CONT

## (undated) DEVICE — SYR 10ML LUER LOK 1/5ML GRAD --

## (undated) DEVICE — CATHETER THOR 32FR L23IN PVC 6 EYELET STR ATRAUM

## (undated) DEVICE — VESSEL LOOPS,MAXI, BLUE: Brand: DEVON

## (undated) DEVICE — AVID DUAL STAGE VENOUS DRAINAGE CANNULA: Brand: AVID DUAL STAGE VENOUS DRAINAGE CANNULA

## (undated) DEVICE — DRAPE FLD WRM W44XL66IN C6L FOR INTRATEMP SYS THERMABASIN

## (undated) DEVICE — PLEDGET SUT SFT OVL 3 8X5 16IN

## (undated) DEVICE — INTENDED FOR TISSUE SEPARATION, AND OTHER PROCEDURES THAT REQUIRE A SHARP SURGICAL BLADE TO PUNCTURE OR CUT.: Brand: BARD-PARKER ® CARBON RIB-BACK BLADES

## (undated) DEVICE — Device

## (undated) DEVICE — REM POLYHESIVE ADULT PATIENT RETURN ELECTRODE: Brand: VALLEYLAB

## (undated) DEVICE — BAG RED 3PLY 2MIL 30X40 IN

## (undated) DEVICE — TRANSFER PK BLD PROD 300ML --

## (undated) DEVICE — AGENT HEMSTAT W4XL4IN OXIDIZED REGENERATED CELOS ABSRB SFT

## (undated) DEVICE — CAP PROTCT ORIG SET ZONE SPEC

## (undated) DEVICE — SOLUTION IRRIG 1000ML H2O STRL BLT

## (undated) DEVICE — TIDISHIELD TRANSPORT, CONTAINMENT COVER FOR BACK TABLE 4'6" (1.37M) TO 8' (2.43M) IN LENGTH: Brand: TIDISHIELD

## (undated) DEVICE — LUER-LOK 360°: Brand: CONNECTA, LUER-LOK

## (undated) DEVICE — INFECTION CONTROL KIT SYS

## (undated) DEVICE — SENSOR TEMP SKIN DISP

## (undated) DEVICE — CATHETER IV 14GA L2IN POLYUR STR ORNG HUB SFTY RADPQ DISP

## (undated) DEVICE — COVER LT HNDL PLAS RIG 1 PER PK

## (undated) DEVICE — SOLUTION IV 250ML 0.9% SOD CHL CLR INJ FLX BG CONT PRT CLSR

## (undated) DEVICE — DERMABOND SKIN ADH 0.7ML -- DERMABOND ADVANCED 12/BX

## (undated) DEVICE — YANKAUER,BULB TIP,W/O VENT,RIGID,STERILE: Brand: MEDLINE

## (undated) DEVICE — CANNULA AORT ROOT INTRO STD TIP 5FR OVERALL LEN 55IN DLP

## (undated) DEVICE — SUT PROL 4-0 30IN SH1 DA BLU --

## (undated) DEVICE — PRESSURE MONITORING SET: Brand: TRUWAVE

## (undated) DEVICE — DRAPE PRB US TRNSDCR 6X96IN --

## (undated) DEVICE — PAD,ABDOMINAL,5"X9",ST,LF,25/BX: Brand: MEDLINE INDUSTRIES, INC.

## (undated) DEVICE — INTENDED TO STANDARDIZE OR CAMERAS TO ALLOW FOR THE USE OF THE OR CAMERA COVER: Brand: ASPEN® O.R. CAMERA COVER

## (undated) DEVICE — WRAP SURG W1.31XL1.34M CARD FOR PT 165-172CM THERMOWRP

## (undated) DEVICE — PLEDGET SURG W3/16XL0.25IN THK1.65MM PTFE OVL FELT FOR THE

## (undated) DEVICE — SET PERF L203CM 12IN RED AND BLU AORT ROOT MULT SLIP CONN

## (undated) DEVICE — 40418 TRENDELENBURG ONE-STEP ARM PROTECTORS LARGE (1 PAIR): Brand: 40418 TRENDELENBURG ONE-STEP ARM PROTECTORS LARGE (1 PAIR)

## (undated) DEVICE — SYS INCISION MANAGEMENT -- PREVENA

## (undated) DEVICE — SUTURE MCRYL SZ 3-0 L27IN ABSRB UD L24MM PS-1 3/8 CIR PRIM Y936H

## (undated) DEVICE — NEEDLE HYPO 18GA L1.5IN PNK S STL HUB POLYPR SHLD REG BVL

## (undated) DEVICE — DRAIN,WOUND,ROUND,24FR,5/16",FULL-FLUTED: Brand: MEDLINE

## (undated) DEVICE — STRAP,POSITIONING,KNEE/BODY,FOAM,4X60": Brand: MEDLINE

## (undated) DEVICE — CONNECTOR DRNGE W3/8-0.5XH3/16XL3/16IN 2:1 SIL CARD STR

## (undated) DEVICE — TUBING, SUCTION, 1/4" X 12', STRAIGHT: Brand: MEDLINE

## (undated) DEVICE — DRESSING SIL W4XL5IN ANTIBACT GELLING FBR CYTOFORM

## (undated) DEVICE — SOLUTION IV 1000ML PH 7.4 INJ NRMSOL R

## (undated) DEVICE — SUMP PERICARD AD 20FR WT TIP VERSATILE DSGN MULT PRT VENT

## (undated) DEVICE — TTL1LYR 16FR10ML 100%SIL TMPST TR: Brand: MEDLINE

## (undated) DEVICE — SYR 50ML LR LCK 1ML GRAD NSAF --

## (undated) DEVICE — GOWN,SIRUS,NONRNF,SETINSLV,XL,20/CS: Brand: MEDLINE

## (undated) DEVICE — GLOVE SURG SZ 7.5 L11.2IN THK9.8MIL STRW LTX POLYMER BEAD

## (undated) DEVICE — SOLUTION IV 1000ML 0.9% SOD CHL